# Patient Record
Sex: FEMALE | Race: WHITE | Employment: FULL TIME | URBAN - METROPOLITAN AREA
[De-identification: names, ages, dates, MRNs, and addresses within clinical notes are randomized per-mention and may not be internally consistent; named-entity substitution may affect disease eponyms.]

---

## 2016-12-01 PROCEDURE — G9678 ONCOLOGY CARE MODEL SERVICE: HCPCS | Performed by: INTERNAL MEDICINE

## 2017-01-16 ENCOUNTER — SNF/IP PROF CHARGE ONLY (OUTPATIENT)
Dept: HEMATOLOGY/ONCOLOGY | Facility: HOSPITAL | Age: 67
End: 2017-01-16

## 2017-01-16 DIAGNOSIS — Z51.81 ENCOUNTER FOR MONITORING AROMATASE INHIBITOR THERAPY: Primary | ICD-10-CM

## 2017-01-16 DIAGNOSIS — Z79.811 ENCOUNTER FOR MONITORING AROMATASE INHIBITOR THERAPY: Primary | ICD-10-CM

## 2017-01-30 ENCOUNTER — TELEPHONE (OUTPATIENT)
Dept: INTERNAL MEDICINE CLINIC | Facility: CLINIC | Age: 67
End: 2017-01-30

## 2017-01-30 ENCOUNTER — PRIOR ORIGINAL RECORDS (OUTPATIENT)
Dept: OTHER | Age: 67
End: 2017-01-30

## 2017-01-31 ENCOUNTER — PRIOR ORIGINAL RECORDS (OUTPATIENT)
Dept: OTHER | Age: 67
End: 2017-01-31

## 2017-01-31 ENCOUNTER — MYAURORA ACCOUNT LINK (OUTPATIENT)
Dept: OTHER | Age: 67
End: 2017-01-31

## 2017-01-31 ENCOUNTER — TELEPHONE (OUTPATIENT)
Dept: HEMATOLOGY/ONCOLOGY | Facility: HOSPITAL | Age: 67
End: 2017-01-31

## 2017-02-01 ENCOUNTER — TELEPHONE (OUTPATIENT)
Dept: HEMATOLOGY/ONCOLOGY | Facility: HOSPITAL | Age: 67
End: 2017-02-01

## 2017-02-01 DIAGNOSIS — Z51.81 ENCOUNTER FOR MONITORING AROMATASE INHIBITOR THERAPY: ICD-10-CM

## 2017-02-01 DIAGNOSIS — Z91.89 AT RISK FOR OSTEOPOROSIS/OSTEOPENIA: ICD-10-CM

## 2017-02-01 DIAGNOSIS — Z79.811 ENCOUNTER FOR MONITORING AROMATASE INHIBITOR THERAPY: ICD-10-CM

## 2017-02-01 DIAGNOSIS — C50.412 BREAST CANCER OF UPPER-OUTER QUADRANT OF LEFT FEMALE BREAST (HCC): Primary | ICD-10-CM

## 2017-02-01 NOTE — TELEPHONE ENCOUNTER
As instructed by Dr. Rebecca Pacheco, spoke with Dimas Swift let her know that there was nothing significant on her DEXA, shows osteopenia. Pt reports taking a calcium supplement and 2000 IU Vit D daily.  Per Dr. Rebecca Pacheco she should continue this and get her vit d level d

## 2017-02-02 ENCOUNTER — MYAURORA ACCOUNT LINK (OUTPATIENT)
Dept: OTHER | Age: 67
End: 2017-02-02

## 2017-02-03 ENCOUNTER — PRIOR ORIGINAL RECORDS (OUTPATIENT)
Dept: OTHER | Age: 67
End: 2017-02-03

## 2017-02-04 ENCOUNTER — MED REC SCAN ONLY (OUTPATIENT)
Dept: INTERNAL MEDICINE CLINIC | Facility: CLINIC | Age: 67
End: 2017-02-04

## 2017-02-15 ENCOUNTER — OFFICE VISIT (OUTPATIENT)
Dept: INTERNAL MEDICINE CLINIC | Facility: CLINIC | Age: 67
End: 2017-02-15

## 2017-02-15 VITALS
BODY MASS INDEX: 33.61 KG/M2 | SYSTOLIC BLOOD PRESSURE: 124 MMHG | HEIGHT: 62.25 IN | WEIGHT: 185 LBS | HEART RATE: 67 BPM | TEMPERATURE: 98 F | RESPIRATION RATE: 12 BRPM | OXYGEN SATURATION: 98 % | DIASTOLIC BLOOD PRESSURE: 60 MMHG

## 2017-02-15 DIAGNOSIS — Z51.81 MEDICATION MONITORING ENCOUNTER: ICD-10-CM

## 2017-02-15 DIAGNOSIS — C50.412 BREAST CANCER OF UPPER-OUTER QUADRANT OF LEFT FEMALE BREAST (HCC): ICD-10-CM

## 2017-02-15 DIAGNOSIS — E10.51 DM (DIABETES MELLITUS) TYPE I CONTROLLED, PERIPHERAL VASCULAR DISORDER: Primary | ICD-10-CM

## 2017-02-15 DIAGNOSIS — R06.00 DYSPNEA ON EXERTION: ICD-10-CM

## 2017-02-15 DIAGNOSIS — K31.84 GASTROPARESIS: ICD-10-CM

## 2017-02-15 DIAGNOSIS — E78.2 MIXED HYPERLIPIDEMIA: ICD-10-CM

## 2017-02-15 PROCEDURE — 99214 OFFICE O/P EST MOD 30 MIN: CPT | Performed by: INTERNAL MEDICINE

## 2017-02-15 NOTE — PROGRESS NOTES
HPI:    Patient ID: Orestes Khan is a 78year old female. HPIpt here for fu on dm htn and lipids. Has been having some issues with digestive tract. Is up to date on opthamology fu. Has brittle dm type 1. Is on a insulin pump.     Review of Systems overweight   HENT:   Head: Normocephalic. Mouth/Throat: No oropharyngeal exudate. Eyes: Conjunctivae are normal. Pupils are equal, round, and reactive to light. No scleral icterus. Neck: No JVD present. No thyromegaly present.    Cardiovascular: Nor

## 2017-02-23 ENCOUNTER — OFFICE VISIT (OUTPATIENT)
Dept: INTERNAL MEDICINE CLINIC | Facility: CLINIC | Age: 67
End: 2017-02-23

## 2017-02-23 VITALS
RESPIRATION RATE: 14 BRPM | HEART RATE: 69 BPM | OXYGEN SATURATION: 98 % | HEIGHT: 62.25 IN | DIASTOLIC BLOOD PRESSURE: 62 MMHG | BODY MASS INDEX: 33.07 KG/M2 | WEIGHT: 182 LBS | TEMPERATURE: 98 F | SYSTOLIC BLOOD PRESSURE: 118 MMHG

## 2017-02-23 DIAGNOSIS — R30.0 DYSURIA: Primary | ICD-10-CM

## 2017-02-23 LAB
CONTROL RUN WITHIN 24 HOURS?: YES
GLUCOSE URINE: 250
NITRITE URINE: POSITIVE
PH URINE: 5 (ref 5–8)
PROTEIN URINE: 100
SPEC GRAVITY: 1 (ref 1–1.03)
URINE CLARITY: CLEAR
URINE COLOR: YELLOW
UROBILINOGEN URINE: 4

## 2017-02-23 PROCEDURE — 99213 OFFICE O/P EST LOW 20 MIN: CPT | Performed by: FAMILY MEDICINE

## 2017-02-23 PROCEDURE — 87086 URINE CULTURE/COLONY COUNT: CPT | Performed by: FAMILY MEDICINE

## 2017-02-23 RX ORDER — CIPROFLOXACIN 250 MG/1
250 TABLET, FILM COATED ORAL 2 TIMES DAILY
Qty: 14 TABLET | Refills: 0 | Status: SHIPPED | OUTPATIENT
Start: 2017-02-23 | End: 2017-03-02

## 2017-02-23 RX ORDER — PHENAZOPYRIDINE HYDROCHLORIDE 100 MG/1
100 TABLET, FILM COATED ORAL 3 TIMES DAILY PRN
Qty: 6 TABLET | Refills: 1 | Status: SHIPPED | OUTPATIENT
Start: 2017-02-23 | End: 2017-08-08 | Stop reason: ALTCHOICE

## 2017-02-23 NOTE — PATIENT INSTRUCTIONS
Urinary Tract Infections in Women    Urinary tract infections (UTIs) are most often caused by bacteria (germs). These bacteria enter the urinary tract. The bacteria may come from outside the body.  Or they may travel from the skin outside the rectum or va The lifestyle changes below will help get rid of your UTI. They may also help prevent future UTIs. · Drink plenty of fluids. This includes water, juice, or other caffeine-free drinks. Fluids help flush bacteria out of your body. · Empty your bladder.  Alw

## 2017-02-23 NOTE — PROGRESS NOTES
Patient has been having symptoms of burning on urination, increased urinary frequency, and mild suprapubic pressure. Patient reports no vaginal discharge. Patient denies flank pain or fever or chills.    History of UTI: yes  Blood in urine: no  Nausea:no 2009     SVC thrombus;  Thrombolysis 05/29/2009   • Breast cancer ([facility] 06/21/2012   • Breast cancer, left ([facility] 2009     lumpectomy 01/16/2009   • Breast cancer, left ([facility] 2009     left breast re-excision, left axillary SLNB   • Breast cancer of upper-out above  Drink plenty of fluids  Urine culture sent.    Patient verbalizes understanding and agrees to plan

## 2017-02-27 NOTE — PROGRESS NOTES
Quick Note:    Please tell pt urine actually did not show an infection. She can stop the antibiotics. If she is still having urinary problems should she follow up so we can recheck her urine.  Thank you  ______

## 2017-03-10 ENCOUNTER — PRIOR ORIGINAL RECORDS (OUTPATIENT)
Dept: OTHER | Age: 67
End: 2017-03-10

## 2017-03-27 ENCOUNTER — TELEPHONE (OUTPATIENT)
Dept: OPTOMETRY | Facility: CLINIC | Age: 67
End: 2017-03-27

## 2017-03-27 NOTE — TELEPHONE ENCOUNTER
pt called. Would like her contacts rechecked. Looks like she called in October to have more contacts ordered. LOV 7/14/16. She states she Needs to be seen in the next week or so because she is having a hard time seeing with her contacts.   Please advise

## 2017-03-31 ENCOUNTER — OFFICE VISIT (OUTPATIENT)
Dept: OPTOMETRY | Facility: CLINIC | Age: 67
End: 2017-03-31

## 2017-03-31 DIAGNOSIS — H52.13 MYOPIA, BILATERAL: Primary | ICD-10-CM

## 2017-03-31 PROCEDURE — 99211 OFF/OP EST MAY X REQ PHY/QHP: CPT | Performed by: OPTOMETRIST

## 2017-03-31 NOTE — PROGRESS NOTES
Thiago Uribe is a 75year old female. HPI:     HPI     Patient is in for a contact lens check. Patient states that she does not see well at night and feels that she needs more power. I advised could also be glare from increasing cataract OS.        L 0.6 - 1.2 oz/week       0 Standard drinks or equivalent, 1-2 Glasses of wine per week       Comment: weekly      Medications:    Current Outpatient Prescriptions:  Phenazopyridine HCl 100 MG Oral Tab Take 1 tablet (100 mg total) by mouth 3 (three) times da Contact Lens Exam     Current Contact Lens Rx      Brand Base Curve Diameter Sphere Over-Sphere   Right        Left Brule II 7.67 9.2 -11.00 -0.25       Lens shows excellent c and m ou.       Final Contact Lens Rx      Brand Base Curve Diameter Sphere

## 2017-04-04 ENCOUNTER — TELEPHONE (OUTPATIENT)
Dept: INTERNAL MEDICINE CLINIC | Facility: CLINIC | Age: 67
End: 2017-04-04

## 2017-04-04 NOTE — TELEPHONE ENCOUNTER
Spoke with patient per Dr Josiah Habermann no medications needed to hold she may hold the baby aspirin for a few days prior then restart after the procedure if the dentist has concern

## 2017-06-20 ENCOUNTER — PRIOR ORIGINAL RECORDS (OUTPATIENT)
Dept: OTHER | Age: 67
End: 2017-06-20

## 2017-06-20 ENCOUNTER — LAB ENCOUNTER (OUTPATIENT)
Dept: LAB | Facility: HOSPITAL | Age: 67
End: 2017-06-20
Attending: INTERNAL MEDICINE
Payer: MEDICARE

## 2017-06-20 DIAGNOSIS — E03.9 HYPOTHYROIDISM: Primary | ICD-10-CM

## 2017-06-20 DIAGNOSIS — E10.9 TYPE 1 DIABETES (HCC): ICD-10-CM

## 2017-06-20 PROCEDURE — 36415 COLL VENOUS BLD VENIPUNCTURE: CPT

## 2017-06-20 PROCEDURE — 84439 ASSAY OF FREE THYROXINE: CPT

## 2017-06-20 PROCEDURE — 80061 LIPID PANEL: CPT

## 2017-06-20 PROCEDURE — 84443 ASSAY THYROID STIM HORMONE: CPT

## 2017-06-20 PROCEDURE — 82043 UR ALBUMIN QUANTITATIVE: CPT

## 2017-06-20 PROCEDURE — 82570 ASSAY OF URINE CREATININE: CPT

## 2017-06-20 PROCEDURE — 83036 HEMOGLOBIN GLYCOSYLATED A1C: CPT

## 2017-06-20 PROCEDURE — 80053 COMPREHEN METABOLIC PANEL: CPT

## 2017-06-21 ENCOUNTER — PRIOR ORIGINAL RECORDS (OUTPATIENT)
Dept: OTHER | Age: 67
End: 2017-06-21

## 2017-06-21 LAB
ALBUMIN: 3.2 G/DL
ALKALINE PHOSPHATATE(ALK PHOS): 89 IU/L
ALT (SGPT): 27 U/L
AST (SGOT): 27 U/L
BILIRUBIN TOTAL: 0.7 MG/DL
BUN: 10 MG/DL
CALCIUM: 9 MG/DL
CHLORIDE: 104 MEQ/L
CHOLESTEROL, TOTAL: 133 MG/DL
CREATININE, SERUM: 0.8 MG/DL
FREE T4: 1.38 MG/DL
GLOBULIN: 2.6 G/DL
GLUCOSE: 103 MG/DL
GLUCOSE: 103 MG/DL
HDL CHOLESTEROL: 58 MG/DL
HEMOGLOBIN A1C: 8.5 %
LDL CHOLESTEROL: 65 MG/DL
POTASSIUM, SERUM: 3.9 MEQ/L
PROTEIN, TOTAL: 5.8 G/DL
SGOT (AST): 27 IU/L
SGPT (ALT): 27 IU/L
SODIUM: 141 MEQ/L
THYROID STIMULATING HORMONE: 0.27 MLU/L
TRIGLYCERIDES: 49 MG/DL

## 2017-07-03 ENCOUNTER — TELEPHONE (OUTPATIENT)
Dept: INTERNAL MEDICINE CLINIC | Facility: CLINIC | Age: 67
End: 2017-07-03

## 2017-07-03 ENCOUNTER — NURSE ONLY (OUTPATIENT)
Dept: INTERNAL MEDICINE CLINIC | Facility: CLINIC | Age: 67
End: 2017-07-03

## 2017-07-03 DIAGNOSIS — R39.9 UTI SYMPTOMS: Primary | ICD-10-CM

## 2017-07-03 DIAGNOSIS — R39.9 UTI SYMPTOMS: ICD-10-CM

## 2017-07-03 LAB
BILIRUB UR QL: NEGATIVE
BILIRUBIN: NEGATIVE
COLOR UR: YELLOW
GLUCOSE (URINE DIPSTICK): 1000 MG/DL
GLUCOSE UR-MCNC: >=500 MG/DL
KETONES (URINE DIPSTICK): NEGATIVE MG/DL
KETONES UR-MCNC: NEGATIVE MG/DL
MULTISTIX LOT#: ABNORMAL NUMERIC
NITRITE UR QL STRIP.AUTO: NEGATIVE
NITRITE, URINE: NEGATIVE
PH UR: 5 [PH] (ref 5–8)
PH, URINE: 5 (ref 4.5–8)
PROT UR-MCNC: NEGATIVE MG/DL
RBC #/AREA URNS AUTO: 11 /HPF
SP GR UR STRIP: 1.01 (ref 1–1.03)
SPECIFIC GRAVITY: 1.01 (ref 1–1.03)
UROBILINOGEN UR STRIP-ACNC: <2
UROBILINOGEN,SEMI-QN: 0.2 MG/DL (ref 0–1.9)
VIT C UR-MCNC: NEGATIVE MG/DL
WBC #/AREA URNS AUTO: 150 /HPF

## 2017-07-03 PROCEDURE — 81003 URINALYSIS AUTO W/O SCOPE: CPT | Performed by: INTERNAL MEDICINE

## 2017-07-03 NOTE — PROGRESS NOTES
Pt's  verified  Per Dr. Shellie Real performed an in office urine dip- abn- sent to [facility] lab for culture

## 2017-08-04 ENCOUNTER — TELEPHONE (OUTPATIENT)
Dept: INTERNAL MEDICINE CLINIC | Facility: CLINIC | Age: 67
End: 2017-08-04

## 2017-08-04 DIAGNOSIS — Z01.818 PREOPERATIVE CLEARANCE: Primary | ICD-10-CM

## 2017-08-04 NOTE — TELEPHONE ENCOUNTER
Called patient she saw Dr Laura Green today he wants to do an a emergency vitrectomy surgery. Patient has to go to the theater tonight and to a wedding tomorrow she does not have time to complete these tests this evening.  She was agitated she did not know to

## 2017-08-05 ENCOUNTER — APPOINTMENT (OUTPATIENT)
Dept: LAB | Facility: HOSPITAL | Age: 67
End: 2017-08-05
Attending: OPHTHALMOLOGY
Payer: MEDICARE

## 2017-08-05 ENCOUNTER — HOSPITAL ENCOUNTER (OUTPATIENT)
Dept: GENERAL RADIOLOGY | Facility: HOSPITAL | Age: 67
Discharge: HOME | End: 2017-08-05
Attending: OPHTHALMOLOGY
Payer: MEDICARE

## 2017-08-05 ENCOUNTER — LAB ENCOUNTER (OUTPATIENT)
Dept: LAB | Facility: HOSPITAL | Age: 67
End: 2017-08-05
Attending: OPHTHALMOLOGY
Payer: MEDICARE

## 2017-08-05 DIAGNOSIS — Z01.818 PREOP EXAMINATION: ICD-10-CM

## 2017-08-05 DIAGNOSIS — Z01.818 PRE-OP EXAM: ICD-10-CM

## 2017-08-05 DIAGNOSIS — Z01.818 PRE-OP EXAM: Primary | ICD-10-CM

## 2017-08-05 LAB
ANION GAP SERPL CALC-SCNC: 7 MMOL/L (ref 0–18)
BASOPHILS # BLD: 0.1 K/UL (ref 0–0.2)
BASOPHILS NFR BLD: 1 %
BUN SERPL-MCNC: 9 MG/DL (ref 8–20)
BUN/CREAT SERPL: 11 (ref 10–20)
CALCIUM SERPL-MCNC: 8.8 MG/DL (ref 8.5–10.5)
CHLORIDE SERPL-SCNC: 102 MMOL/L (ref 95–110)
CO2 SERPL-SCNC: 28 MMOL/L (ref 22–32)
CREAT SERPL-MCNC: 0.82 MG/DL (ref 0.5–1.5)
EOSINOPHIL # BLD: 0.7 K/UL (ref 0–0.7)
EOSINOPHIL NFR BLD: 9 %
ERYTHROCYTE [DISTWIDTH] IN BLOOD BY AUTOMATED COUNT: 13.6 % (ref 11–15)
GLUCOSE SERPL-MCNC: 268 MG/DL (ref 70–99)
HCT VFR BLD AUTO: 41.9 % (ref 35–48)
HGB BLD-MCNC: 13.7 G/DL (ref 12–16)
LYMPHOCYTES # BLD: 1.1 K/UL (ref 1–4)
LYMPHOCYTES NFR BLD: 15 %
MCH RBC QN AUTO: 29.8 PG (ref 27–32)
MCHC RBC AUTO-ENTMCNC: 32.7 G/DL (ref 32–37)
MCV RBC AUTO: 91.3 FL (ref 80–100)
MONOCYTES # BLD: 0.5 K/UL (ref 0–1)
MONOCYTES NFR BLD: 7 %
NEUTROPHILS # BLD AUTO: 5.1 K/UL (ref 1.8–7.7)
NEUTROPHILS NFR BLD: 68 %
OSMOLALITY UR CALC.SUM OF ELEC: 292 MOSM/KG (ref 275–295)
PLATELET # BLD AUTO: 204 K/UL (ref 140–400)
PMV BLD AUTO: 10.6 FL (ref 7.4–10.3)
POTASSIUM SERPL-SCNC: 3.9 MMOL/L (ref 3.3–5.1)
RBC # BLD AUTO: 4.58 M/UL (ref 3.7–5.4)
SODIUM SERPL-SCNC: 137 MMOL/L (ref 136–144)
WBC # BLD AUTO: 7.5 K/UL (ref 4–11)

## 2017-08-05 PROCEDURE — 36415 COLL VENOUS BLD VENIPUNCTURE: CPT

## 2017-08-05 PROCEDURE — 85025 COMPLETE CBC W/AUTO DIFF WBC: CPT

## 2017-08-05 PROCEDURE — 71020 XR CHEST PA + LAT CHEST (CPT=71020): CPT | Performed by: OPHTHALMOLOGY

## 2017-08-05 PROCEDURE — 80048 BASIC METABOLIC PNL TOTAL CA: CPT

## 2017-08-05 PROCEDURE — 93005 ELECTROCARDIOGRAM TRACING: CPT

## 2017-08-05 PROCEDURE — 93010 ELECTROCARDIOGRAM REPORT: CPT | Performed by: OPHTHALMOLOGY

## 2017-08-08 ENCOUNTER — OFFICE VISIT (OUTPATIENT)
Dept: INTERNAL MEDICINE CLINIC | Facility: CLINIC | Age: 67
End: 2017-08-08

## 2017-08-08 VITALS
HEART RATE: 69 BPM | RESPIRATION RATE: 18 BRPM | BODY MASS INDEX: 33.25 KG/M2 | DIASTOLIC BLOOD PRESSURE: 74 MMHG | OXYGEN SATURATION: 98 % | TEMPERATURE: 98 F | WEIGHT: 183 LBS | HEIGHT: 62.25 IN | SYSTOLIC BLOOD PRESSURE: 124 MMHG

## 2017-08-08 DIAGNOSIS — E10.51 DM (DIABETES MELLITUS) TYPE I CONTROLLED, PERIPHERAL VASCULAR DISORDER: ICD-10-CM

## 2017-08-08 DIAGNOSIS — Z01.818 PREOPERATIVE CLEARANCE: Primary | ICD-10-CM

## 2017-08-08 PROCEDURE — 99214 OFFICE O/P EST MOD 30 MIN: CPT | Performed by: INTERNAL MEDICINE

## 2017-08-08 NOTE — PROGRESS NOTES
HPI:    Patient ID: Ever Vázquez is a 75year old female. HPIPt here for a preoperative evaluation for a right vitrectomy for acute retinal bleeding. Had preoperative labs and cxr and ekg .   All reviewed and normal except for fasting glucose elevati well-nourished. HENT:   Head: Normocephalic and atraumatic. Mouth/Throat: Oropharyngeal exudate (Pnd) present. Eyes: Conjunctivae are normal. Pupils are equal, round, and reactive to light. Neck: Neck supple.    Cardiovascular: Normal rate and regul

## 2017-08-30 ENCOUNTER — PRIOR ORIGINAL RECORDS (OUTPATIENT)
Dept: OTHER | Age: 67
End: 2017-08-30

## 2017-08-31 ENCOUNTER — TELEPHONE (OUTPATIENT)
Dept: INTERNAL MEDICINE CLINIC | Facility: CLINIC | Age: 67
End: 2017-08-31

## 2017-09-15 ENCOUNTER — TELEPHONE (OUTPATIENT)
Dept: OPHTHALMOLOGY | Facility: CLINIC | Age: 67
End: 2017-09-15

## 2017-09-15 NOTE — TELEPHONE ENCOUNTER
Pt is having trouble seeingand needs contacs.  pt is leaving to [place] in [employer] and needs an appointment as soon as possible

## 2017-09-26 ENCOUNTER — LAB ENCOUNTER (OUTPATIENT)
Dept: LAB | Facility: HOSPITAL | Age: 67
End: 2017-09-26
Attending: INTERNAL MEDICINE
Payer: MEDICARE

## 2017-09-26 ENCOUNTER — PRIOR ORIGINAL RECORDS (OUTPATIENT)
Dept: OTHER | Age: 67
End: 2017-09-26

## 2017-09-26 DIAGNOSIS — E03.9 HYPOTHYROIDISM: ICD-10-CM

## 2017-09-26 DIAGNOSIS — I25.10 CORONARY ARTERY DISEASE: Primary | ICD-10-CM

## 2017-09-26 LAB
ALBUMIN SERPL BCP-MCNC: 3.5 G/DL (ref 3.5–4.8)
ALBUMIN/GLOB SERPL: 1.3 {RATIO} (ref 1–2)
ALP SERPL-CCNC: 93 U/L (ref 32–100)
ALT SERPL-CCNC: 28 U/L (ref 14–54)
ANION GAP SERPL CALC-SCNC: 6 MMOL/L (ref 0–18)
AST SERPL-CCNC: 26 U/L (ref 15–41)
BILIRUB SERPL-MCNC: 0.7 MG/DL (ref 0.3–1.2)
BUN SERPL-MCNC: 10 MG/DL (ref 8–20)
BUN/CREAT SERPL: 13.9 (ref 10–20)
CALCIUM SERPL-MCNC: 9 MG/DL (ref 8.5–10.5)
CHLORIDE SERPL-SCNC: 103 MMOL/L (ref 95–110)
CHOLEST SERPL-MCNC: 153 MG/DL (ref 110–200)
CO2 SERPL-SCNC: 31 MMOL/L (ref 22–32)
CREAT SERPL-MCNC: 0.72 MG/DL (ref 0.5–1.5)
GLOBULIN PLAS-MCNC: 2.6 G/DL (ref 2.5–3.7)
GLUCOSE SERPL-MCNC: 162 MG/DL (ref 70–99)
HBA1C MFR BLD: 8.8 % (ref 4–6)
HDLC SERPL-MCNC: 67 MG/DL
LDLC SERPL CALC-MCNC: 74 MG/DL (ref 0–99)
NONHDLC SERPL-MCNC: 86 MG/DL
OSMOLALITY UR CALC.SUM OF ELEC: 293 MOSM/KG (ref 275–295)
POTASSIUM SERPL-SCNC: 4 MMOL/L (ref 3.3–5.1)
PROT SERPL-MCNC: 6.1 G/DL (ref 5.9–8.4)
SODIUM SERPL-SCNC: 140 MMOL/L (ref 136–144)
T4 FREE SERPL-MCNC: 1.27 NG/DL (ref 0.58–1.64)
TRIGL SERPL-MCNC: 60 MG/DL (ref 1–149)
TSH SERPL-ACNC: 1.11 UIU/ML (ref 0.45–5.33)

## 2017-09-26 PROCEDURE — 36415 COLL VENOUS BLD VENIPUNCTURE: CPT

## 2017-09-26 PROCEDURE — 80061 LIPID PANEL: CPT

## 2017-09-26 PROCEDURE — 83036 HEMOGLOBIN GLYCOSYLATED A1C: CPT

## 2017-09-26 PROCEDURE — 84439 ASSAY OF FREE THYROXINE: CPT

## 2017-09-26 PROCEDURE — 80053 COMPREHEN METABOLIC PANEL: CPT

## 2017-09-26 PROCEDURE — 84443 ASSAY THYROID STIM HORMONE: CPT

## 2017-09-27 LAB
ALBUMIN: 3.5 G/DL
ALKALINE PHOSPHATATE(ALK PHOS): 93 IU/L
ALT (SGPT): 28 U/L
AST (SGOT): 26 U/L
BILIRUBIN TOTAL: 0.7 MG/DL
BUN: 10 MG/DL
CALCIUM: 9 MG/DL
CHLORIDE: 103 MEQ/L
CHOLESTEROL, TOTAL: 153 MG/DL
CREATININE, SERUM: 0.72 MG/DL
FREE T4: 1.27 MG/DL
GLOBULIN: 2.6 G/DL
GLUCOSE: 162 MG/DL
GLUCOSE: 162 MG/DL
HDL CHOLESTEROL: 67 MG/DL
HEMOGLOBIN A1C: 8.8 %
LDL CHOLESTEROL: 74 MG/DL
POTASSIUM, SERUM: 4 MEQ/L
PROTEIN, TOTAL: 6.1 G/DL
SGOT (AST): 26 IU/L
SGPT (ALT): 28 IU/L
SODIUM: 140 MEQ/L
THYROID STIMULATING HORMONE: 1.11 MLU/L
TRIGLYCERIDES: 60 MG/DL

## 2017-09-29 ENCOUNTER — PRIOR ORIGINAL RECORDS (OUTPATIENT)
Dept: OTHER | Age: 67
End: 2017-09-29

## 2017-10-10 ENCOUNTER — TELEPHONE (OUTPATIENT)
Dept: HEMATOLOGY/ONCOLOGY | Facility: HOSPITAL | Age: 67
End: 2017-10-10

## 2017-10-10 DIAGNOSIS — C50.919 MALIGNANT NEOPLASM OF BREAST (FEMALE) (HCC): Primary | ICD-10-CM

## 2017-10-10 NOTE — TELEPHONE ENCOUNTER
Called pt and let her know order has been entered, she states she will do it in December prior to her appt with Dr. Jeremías Dorsey. I encouraged her to do it sooner than December as her last mammo was done more that a year ago. Janett Oswald verbalized understanding.

## 2017-10-10 NOTE — TELEPHONE ENCOUNTER
Nahun Corbett calling for order for mammo. She is scheduled with Dr Joan Elmore in December . Please contact morelia when order is placed.  garcia

## 2017-10-12 ENCOUNTER — TELEPHONE (OUTPATIENT)
Dept: HEMATOLOGY/ONCOLOGY | Facility: HOSPITAL | Age: 67
End: 2017-10-12

## 2017-10-12 DIAGNOSIS — C50.412 BREAST CANCER OF UPPER-OUTER QUADRANT OF LEFT FEMALE BREAST (HCC): ICD-10-CM

## 2017-10-12 DIAGNOSIS — C50.919 MALIGNANT NEOPLASM OF BREAST (FEMALE) (HCC): Primary | ICD-10-CM

## 2017-10-12 NOTE — TELEPHONE ENCOUNTER
Pt scheduled diagnostic mammo for 10/23/17 but ICD-10 code on order did not pass. Needs to be changed/revised and new order sent.

## 2017-10-23 ENCOUNTER — HOSPITAL ENCOUNTER (OUTPATIENT)
Dept: MAMMOGRAPHY | Facility: HOSPITAL | Age: 67
Discharge: HOME | End: 2017-10-23
Attending: INTERNAL MEDICINE
Payer: MEDICARE

## 2017-10-23 ENCOUNTER — TELEPHONE (OUTPATIENT)
Dept: INTERNAL MEDICINE CLINIC | Facility: CLINIC | Age: 67
End: 2017-10-23

## 2017-10-23 DIAGNOSIS — C50.919 MALIGNANT NEOPLASM OF BREAST (FEMALE) (HCC): ICD-10-CM

## 2017-10-23 PROCEDURE — 77066 DX MAMMO INCL CAD BI: CPT | Performed by: INTERNAL MEDICINE

## 2017-10-24 ENCOUNTER — OFFICE VISIT (OUTPATIENT)
Dept: INTERNAL MEDICINE CLINIC | Facility: CLINIC | Age: 67
End: 2017-10-24

## 2017-10-24 VITALS
DIASTOLIC BLOOD PRESSURE: 58 MMHG | SYSTOLIC BLOOD PRESSURE: 122 MMHG | OXYGEN SATURATION: 96 % | BODY MASS INDEX: 33 KG/M2 | TEMPERATURE: 99 F | WEIGHT: 183 LBS | HEART RATE: 84 BPM

## 2017-10-24 DIAGNOSIS — E10.51 DM (DIABETES MELLITUS) TYPE I CONTROLLED, PERIPHERAL VASCULAR DISORDER: ICD-10-CM

## 2017-10-24 DIAGNOSIS — Z23 INFLUENZA VACCINE NEEDED: ICD-10-CM

## 2017-10-24 DIAGNOSIS — J45.21 MILD INTERMITTENT ASTHMATIC BRONCHITIS WITH ACUTE EXACERBATION: Primary | ICD-10-CM

## 2017-10-24 PROCEDURE — 94640 AIRWAY INHALATION TREATMENT: CPT | Performed by: INTERNAL MEDICINE

## 2017-10-24 PROCEDURE — 99213 OFFICE O/P EST LOW 20 MIN: CPT | Performed by: INTERNAL MEDICINE

## 2017-10-24 RX ORDER — ALBUTEROL SULFATE 90 UG/1
2 AEROSOL, METERED RESPIRATORY (INHALATION) EVERY 4 HOURS PRN
Qty: 1 INHALER | Refills: 6 | Status: SHIPPED | OUTPATIENT
Start: 2017-10-24 | End: 2018-01-23

## 2017-10-24 RX ORDER — PROMETHAZINE HYDROCHLORIDE AND CODEINE PHOSPHATE 6.25; 1 MG/5ML; MG/5ML
2.5 SYRUP ORAL EVERY 4 HOURS PRN
Qty: 120 ML | Refills: 0 | Status: SHIPPED | OUTPATIENT
Start: 2017-10-24 | End: 2017-11-28 | Stop reason: ALTCHOICE

## 2017-10-24 RX ORDER — METOPROLOL SUCCINATE 25 MG/1
TABLET, EXTENDED RELEASE ORAL
Refills: 4 | COMMUNITY
Start: 2017-10-22

## 2017-10-24 RX ORDER — CLOBETASOL PROPIONATE 0.46 MG/ML
SOLUTION TOPICAL
Refills: 2 | COMMUNITY
Start: 2017-10-16

## 2017-10-24 RX ORDER — CEFDINIR 300 MG/1
300 CAPSULE ORAL 2 TIMES DAILY
Qty: 20 CAPSULE | Refills: 1 | Status: SHIPPED | OUTPATIENT
Start: 2017-10-24 | End: 2017-11-28 | Stop reason: ALTCHOICE

## 2017-10-24 RX ORDER — IPRATROPIUM BROMIDE AND ALBUTEROL SULFATE 2.5; .5 MG/3ML; MG/3ML
3 SOLUTION RESPIRATORY (INHALATION) ONCE
Status: COMPLETED | OUTPATIENT
Start: 2017-10-24 | End: 2017-10-24

## 2017-10-24 RX ADMIN — IPRATROPIUM BROMIDE AND ALBUTEROL SULFATE 3 ML: 2.5; .5 SOLUTION RESPIRATORY (INHALATION) at 12:31:00

## 2017-10-24 NOTE — PROGRESS NOTES
HPI:    Patient ID: Conception Marker is a 75year old female. Pt here for eval of a cough and chest congestion. Has been under the weather for over a week. Is a non smoker. Has had some chills.     Review of Systems   Constitutional: Positive for fatig Disp:  Rfl: 10   simvastatin (ZOCOR) 40 MG Oral Tab  Disp:  Rfl: 11   NOVOLOG 100 UNIT/ML Subcutaneous Solution  Disp:  Rfl: 4   Levothyroxine Sodium (SYNTHROID, LEVOTHROID) 137 MCG Oral Tab Take 137 mcg by mouth before breakfast.   Disp:  Rfl: 1   aspir acute exacerbation  (primary encounter diagnosis) Pro air  2 puffs q 6 prn cefdinir 300mg bid x 10 days  Dm (diabetes mellitus) type i controlled, peripheral vascular disorder (hcc)sugars are high due to ongoing illness. Has brittle disease.     No orders

## 2017-10-24 NOTE — PROGRESS NOTES
Pt's  verified  Per Dr. Priscilla Hernandez administered DUONEB via nebulizer- Pt tolerated treatment well.

## 2017-11-15 ENCOUNTER — TELEPHONE (OUTPATIENT)
Dept: OPTOMETRY | Facility: CLINIC | Age: 67
End: 2017-11-15

## 2017-11-15 RX ORDER — ANASTROZOLE 1 MG/1
TABLET ORAL
Qty: 30 TABLET | Refills: 0 | Status: SHIPPED | OUTPATIENT
Start: 2017-11-15 | End: 2017-12-09

## 2017-11-15 NOTE — TELEPHONE ENCOUNTER
pt called. She states her contacts do not seem to be working correctly. LOV 3/31/17. Asking to see PPS in the next 2 weeks if possible. She is out of town now, will be back Monday evening, please call. Thank you.

## 2017-11-28 ENCOUNTER — OFFICE VISIT (OUTPATIENT)
Dept: INTERNAL MEDICINE CLINIC | Facility: CLINIC | Age: 67
End: 2017-11-28

## 2017-11-28 VITALS
HEART RATE: 82 BPM | OXYGEN SATURATION: 99 % | DIASTOLIC BLOOD PRESSURE: 76 MMHG | WEIGHT: 183 LBS | SYSTOLIC BLOOD PRESSURE: 128 MMHG | RESPIRATION RATE: 17 BRPM | HEIGHT: 62.25 IN | BODY MASS INDEX: 33.25 KG/M2

## 2017-11-28 DIAGNOSIS — S23.41XA SPRAIN OF COSTAL CARTILAGE, INITIAL ENCOUNTER: Primary | ICD-10-CM

## 2017-11-28 DIAGNOSIS — E10.51 DM (DIABETES MELLITUS) TYPE I CONTROLLED, PERIPHERAL VASCULAR DISORDER: ICD-10-CM

## 2017-11-28 DIAGNOSIS — E66.9 OBESITY (BMI 30.0-34.9): ICD-10-CM

## 2017-11-28 DIAGNOSIS — E10.36 TYPE 1 DIABETES MELLITUS WITH DIABETIC CATARACT (HCC): ICD-10-CM

## 2017-11-28 PROCEDURE — 99213 OFFICE O/P EST LOW 20 MIN: CPT | Performed by: FAMILY MEDICINE

## 2017-11-29 PROBLEM — E10.36 TYPE 1 DIABETES MELLITUS WITH DIABETIC CATARACT (HCC): Status: ACTIVE | Noted: 2017-11-29

## 2017-11-29 PROBLEM — Z98.890 HISTORY OF VITRECTOMY: Status: ACTIVE | Noted: 2017-11-29

## 2017-11-29 PROBLEM — Z85.3 HISTORY OF BREAST CANCER IN FEMALE: Status: ACTIVE | Noted: 2017-11-29

## 2017-11-29 NOTE — PROGRESS NOTES
CC:  Pain (Pt presents to clinic for c/o  right chest with extension to the right side on and off x months. )      Hx of CC:  LOWER RIB CAGE/TRUNK PAIN WITH CERTAIN MOVEMENTS X MONTHS BUT WORSENING LATELY. NO ACUTE INJURY.   PATIENT RECENTLY RETURNED FROM

## 2017-11-30 ENCOUNTER — TELEPHONE (OUTPATIENT)
Dept: OPTOMETRY | Facility: CLINIC | Age: 67
End: 2017-11-30

## 2017-11-30 NOTE — TELEPHONE ENCOUNTER
Patient is due to see both Dr. Angeli Loganident and Dr. Cuco Sarmiento in the next few weeks. She is not seeing as well out of her OS again. I have been adding more minus OS as cataract increases.  Not seeing clear again and I explained that there will be a limit to how much

## 2017-12-11 RX ORDER — ANASTROZOLE 1 MG/1
TABLET ORAL
Qty: 30 TABLET | Refills: 0 | Status: SHIPPED | OUTPATIENT
Start: 2017-12-11 | End: 2017-12-28

## 2017-12-13 ENCOUNTER — APPOINTMENT (OUTPATIENT)
Dept: HEMATOLOGY/ONCOLOGY | Facility: HOSPITAL | Age: 67
End: 2017-12-13
Attending: INTERNAL MEDICINE
Payer: MEDICARE

## 2017-12-18 ENCOUNTER — HOSPITAL ENCOUNTER (OUTPATIENT)
Age: 67
Discharge: HOME | End: 2017-12-18
Attending: FAMILY MEDICINE
Payer: MEDICARE

## 2017-12-18 ENCOUNTER — TELEPHONE (OUTPATIENT)
Dept: INTERNAL MEDICINE CLINIC | Facility: CLINIC | Age: 67
End: 2017-12-18

## 2017-12-18 VITALS
BODY MASS INDEX: 32.94 KG/M2 | RESPIRATION RATE: 16 BRPM | DIASTOLIC BLOOD PRESSURE: 80 MMHG | HEIGHT: 62 IN | OXYGEN SATURATION: 97 % | SYSTOLIC BLOOD PRESSURE: 142 MMHG | TEMPERATURE: 97 F | HEART RATE: 78 BPM | WEIGHT: 179 LBS

## 2017-12-18 DIAGNOSIS — N39.0 ACUTE UTI: Primary | ICD-10-CM

## 2017-12-18 PROCEDURE — 99213 OFFICE O/P EST LOW 20 MIN: CPT

## 2017-12-18 PROCEDURE — 81002 URINALYSIS NONAUTO W/O SCOPE: CPT

## 2017-12-18 PROCEDURE — 99203 OFFICE O/P NEW LOW 30 MIN: CPT

## 2017-12-18 RX ORDER — CIPROFLOXACIN 250 MG/1
250 TABLET, FILM COATED ORAL 2 TIMES DAILY
Qty: 6 TABLET | Refills: 0 | Status: SHIPPED | OUTPATIENT
Start: 2017-12-18 | End: 2017-12-21

## 2017-12-18 RX ORDER — FLUCONAZOLE 150 MG/1
150 TABLET ORAL ONCE
Qty: 1 TABLET | Refills: 0 | Status: SHIPPED | OUTPATIENT
Start: 2017-12-18 | End: 2017-12-18

## 2017-12-18 NOTE — ED PROVIDER NOTES
Patient presents with:  Urinary Symptoms (urologic)    HPI:     Nell Montgomery is a 75year old female who presents with a chief complaint of dysuria, frequency, urgency of urination  Onset of symptoms: 2  days  Associated symptoms:  dysuria and urinary f 12/18/17  4:33 PM   Result Value Ref Range   Urine Color Yellow Yellow   Urine Clarity Cloudy (A) Clear   Glucose, Urine 100  (A) Negative mg/dL   Bilirubin, Urine Negative Negative   Ketone, Urine Negative Negative mg/dL   Specific Gravity, Urine 1.020 1.

## 2017-12-18 NOTE — TELEPHONE ENCOUNTER
Pt l/m on nurse line about possible UTI- has leftover ABX from last one and wants a call back to know if it is okay to take them

## 2017-12-18 NOTE — TELEPHONE ENCOUNTER
Spoke with the patient and explained not to take the previous antibiotic, We do not know what if any infection is being treated and old medication is not appropriate just to take.  She understood as she thought she had a UTI while in [place] she went to [facility]

## 2017-12-21 ENCOUNTER — TELEPHONE (OUTPATIENT)
Dept: OPTOMETRY | Facility: CLINIC | Age: 67
End: 2017-12-21

## 2017-12-23 ENCOUNTER — PRIOR ORIGINAL RECORDS (OUTPATIENT)
Dept: OTHER | Age: 67
End: 2017-12-23

## 2017-12-23 ENCOUNTER — LAB ENCOUNTER (OUTPATIENT)
Dept: LAB | Facility: HOSPITAL | Age: 67
End: 2017-12-23
Attending: INTERNAL MEDICINE
Payer: MEDICARE

## 2017-12-23 DIAGNOSIS — E03.9 HYPOTHYROIDISM: ICD-10-CM

## 2017-12-23 DIAGNOSIS — E10.9 DIABETES MELLITUS TYPE I (HCC): Primary | ICD-10-CM

## 2017-12-23 PROCEDURE — 80061 LIPID PANEL: CPT

## 2017-12-23 PROCEDURE — 84439 ASSAY OF FREE THYROXINE: CPT

## 2017-12-23 PROCEDURE — 83036 HEMOGLOBIN GLYCOSYLATED A1C: CPT

## 2017-12-23 PROCEDURE — 82043 UR ALBUMIN QUANTITATIVE: CPT

## 2017-12-23 PROCEDURE — 80053 COMPREHEN METABOLIC PANEL: CPT

## 2017-12-23 PROCEDURE — 84443 ASSAY THYROID STIM HORMONE: CPT

## 2017-12-23 PROCEDURE — 82570 ASSAY OF URINE CREATININE: CPT

## 2017-12-23 PROCEDURE — 36415 COLL VENOUS BLD VENIPUNCTURE: CPT

## 2017-12-26 LAB
ALBUMIN: 3.2 G/DL
ALKALINE PHOSPHATATE(ALK PHOS): 97 IU/L
ALT (SGPT): 35 U/L
AST (SGOT): 30 U/L
BILIRUBIN TOTAL: 0.5 MG/DL
BUN: 11 MG/DL
CALCIUM: 9 MG/DL
CHLORIDE: 105 MEQ/L
CHOLESTEROL, TOTAL: 158 MG/DL
CREATININE, SERUM: 0.87 MG/DL
GLOBULIN: 2.9 G/DL
GLUCOSE: 145 MG/DL
GLUCOSE: 145 MG/DL
GLYCOHEMOGLOBIN: 8.6 %
HDL CHOLESTEROL: 55 MG/DL
LDL CHOLESTEROL: 92 MG/DL
NON-HDL CHOLESTEROL: 103 MG/DL
POTASSIUM, SERUM: 3.6 MEQ/L
PROTEIN, TOTAL: 6.1 G/DL
SGOT (AST): 30 IU/L
SGPT (ALT): 35 IU/L
SODIUM: 138 MEQ/L
THYROID STIMULATING HORMONE: 0.5 MLU/L
TRIGLYCERIDES: 54 MG/DL

## 2017-12-27 ENCOUNTER — PRIOR ORIGINAL RECORDS (OUTPATIENT)
Dept: OTHER | Age: 67
End: 2017-12-27

## 2017-12-28 ENCOUNTER — OFFICE VISIT (OUTPATIENT)
Dept: HEMATOLOGY/ONCOLOGY | Facility: HOSPITAL | Age: 67
End: 2017-12-28
Attending: INTERNAL MEDICINE
Payer: MEDICARE

## 2017-12-28 VITALS
DIASTOLIC BLOOD PRESSURE: 68 MMHG | HEIGHT: 62.25 IN | BODY MASS INDEX: 33.61 KG/M2 | RESPIRATION RATE: 16 BRPM | HEART RATE: 78 BPM | WEIGHT: 185 LBS | TEMPERATURE: 98 F | SYSTOLIC BLOOD PRESSURE: 136 MMHG

## 2017-12-28 DIAGNOSIS — C50.411 MALIGNANT NEOPLASM OF UPPER-OUTER QUADRANT OF RIGHT BREAST IN FEMALE, ESTROGEN RECEPTOR POSITIVE (HCC): Primary | ICD-10-CM

## 2017-12-28 DIAGNOSIS — Z51.81 ENCOUNTER FOR MONITORING AROMATASE INHIBITOR THERAPY: ICD-10-CM

## 2017-12-28 DIAGNOSIS — Z98.890 HISTORY OF VITRECTOMY: ICD-10-CM

## 2017-12-28 DIAGNOSIS — E10.51 DM (DIABETES MELLITUS) TYPE I CONTROLLED, PERIPHERAL VASCULAR DISORDER: ICD-10-CM

## 2017-12-28 DIAGNOSIS — Z79.811 ENCOUNTER FOR MONITORING AROMATASE INHIBITOR THERAPY: ICD-10-CM

## 2017-12-28 DIAGNOSIS — Z17.0 MALIGNANT NEOPLASM OF UPPER-OUTER QUADRANT OF RIGHT BREAST IN FEMALE, ESTROGEN RECEPTOR POSITIVE (HCC): Primary | ICD-10-CM

## 2017-12-28 PROCEDURE — 99214 OFFICE O/P EST MOD 30 MIN: CPT | Performed by: INTERNAL MEDICINE

## 2017-12-28 RX ORDER — ANASTROZOLE 1 MG/1
1 TABLET ORAL
Qty: 90 TABLET | Refills: 3 | Status: SHIPPED | OUTPATIENT
Start: 2017-12-28 | End: 2019-01-14

## 2017-12-28 RX ORDER — AZITHROMYCIN 250 MG/1
TABLET, FILM COATED ORAL
Qty: 6 TABLET | Refills: 0 | Status: SHIPPED | OUTPATIENT
Start: 2017-12-28 | End: 2018-01-23

## 2017-12-28 NOTE — PROGRESS NOTES
JEFF Betancur is a 75year old female with ER 87% TX 34% Ki-67 48% Her 2 magraux negative, infiltrating ductal  [id number] left breast cancer. Patient was treated with CMF, radiation therapy, and a sequence of aromatase inhibitor medications.   She is c Cardiovascular: Positive for leg swelling. Negative for chest pain and palpitations. Gastrointestinal: Negative for abdominal pain, constipation, diarrhea, nausea and vomiting.         Still having gerd taking omeprazole for it   Endocrine:        Known d Calcipotriene 0.005 % Apply Externally Ointment  Disp:  Rfl: 5   Fluocinonide 0.05 % Apply Externally Ointment  Disp:  Rfl: 3   promethazine-codeine 6.25-10 MG/5ML Oral Syrup Take 5 mL by mouth every 6 (six) hours as needed for cough.  Disp: 118 mL Rfl: 0 10/07/2009: NEEDLE CORE BIOPSY, BREAST (DMG) Right      Comment: RIGHT breast nodules; mammatome core needle                biopsies; 2009  No date: REDUCTION LEFT    Social History  Social History   Marital status:   Spouse name: N/A    Years of e Psychiatric: She has a normal mood and affect. Her behavior is normal. Judgment and thought content normal.   Nursing note and vitals reviewed.           ASSESSMENT/PLAN:   Malignant neoplasm of upper-outer quadrant of right breast in female, estrogen recep CHOLESTEROL, TOTAL Latest Ref Range: 110 - 200 mg/dL 158   Triglycerides Latest Ref Range: 1 - 149 mg/dL 54   HDL Cholesterol Latest Units: mg/dL 55   NON HDL CHOL Latest Ref Range: <130 mg/dL 103   LDL Cholesterol Calc Latest Ref Range: 0 - 99 mg/dL 92 There is a stellate density in the left upper-outer quadrant consistent with post treatment scar. This contains coarse internal dystrophic calcifications. There is overlying breast deformity and anterior skin thickening.  Additional benign calc

## 2018-01-18 ENCOUNTER — TELEPHONE (OUTPATIENT)
Dept: OPTOMETRY | Facility: CLINIC | Age: 68
End: 2018-01-18

## 2018-01-18 NOTE — TELEPHONE ENCOUNTER
Pt requesting to speak with PPS in regards to wearing contacts after her upcoming cataract surgery on 02/06/18. Please advise. Thank you.

## 2018-01-19 NOTE — TELEPHONE ENCOUNTER
Having phaco with Dr. Abdon Pearce in Feb. Wants contact check after so made appointment for 2-9-18 at 9 am.

## 2018-01-23 ENCOUNTER — OFFICE VISIT (OUTPATIENT)
Dept: FAMILY MEDICINE CLINIC | Facility: CLINIC | Age: 68
End: 2018-01-23

## 2018-01-23 VITALS
TEMPERATURE: 98 F | DIASTOLIC BLOOD PRESSURE: 68 MMHG | HEIGHT: 62.25 IN | HEART RATE: 80 BPM | OXYGEN SATURATION: 97 % | WEIGHT: 183 LBS | RESPIRATION RATE: 12 BRPM | SYSTOLIC BLOOD PRESSURE: 124 MMHG | BODY MASS INDEX: 33.25 KG/M2

## 2018-01-23 DIAGNOSIS — R05.3 PERSISTENT COUGH FOR 3 WEEKS OR LONGER: ICD-10-CM

## 2018-01-23 DIAGNOSIS — J40 BRONCHITIS: Primary | ICD-10-CM

## 2018-01-23 PROCEDURE — 99213 OFFICE O/P EST LOW 20 MIN: CPT | Performed by: FAMILY MEDICINE

## 2018-01-23 RX ORDER — ALBUTEROL SULFATE 90 UG/1
2 AEROSOL, METERED RESPIRATORY (INHALATION) EVERY 4 HOURS PRN
Qty: 1 INHALER | Refills: 1 | Status: SHIPPED | OUTPATIENT
Start: 2018-01-23

## 2018-01-23 RX ORDER — PROMETHAZINE HYDROCHLORIDE AND CODEINE PHOSPHATE 6.25; 1 MG/5ML; MG/5ML
5 SYRUP ORAL EVERY 6 HOURS PRN
Qty: 118 ML | Refills: 0 | Status: SHIPPED | OUTPATIENT
Start: 2018-01-23 | End: 2018-03-26 | Stop reason: ALTCHOICE

## 2018-01-23 NOTE — PROGRESS NOTES
HPI:    Patient ID: Cinthia Menon is a 75year old female.     Cough  -for past 4-5wk  -occasionally productive clear yellow sputum  -no congestion, rhinorrhea, sore throat, body aches, f/c, n/v/d  -got zpack  4wks ago and did not improve  -saw oncologist breakfast.   Disp:  Rfl: 1   aspirin 81 MG Oral Chew Tab Chew  by mouth. Disp:  Rfl:    Benazepril-Hydrochlorothiazide (LOTENSIN HCT) 10-12.5 MG Oral Tab Take 1 tablet by mouth daily.    Disp:  Rfl:    Omeprazole 40 MG Oral Capsule Delayed Release Take 40 m placed in this encounter. Meds This Visit:  Signed Prescriptions Disp Refills    promethazine-codeine 6.25-10 MG/5ML Oral Syrup 118 mL 0      Sig: Take 5 mL by mouth every 6 (six) hours as needed for cough.       Albuterol Sulfate HFA (PROAIR HFA) 108

## 2018-01-23 NOTE — PATIENT INSTRUCTIONS
Chronic Cough with Uncertain Cause (Adult)    Everyone has had a cough as part of the common cold, flu, or bronchitis. This kind of cough occurs along with an achy feeling, low-grade fever, nasal and sinus congestion, and a scratchy or sore throat.  This Let your healthcare provider know if you are taking any of these. Asthma  Cough may be the only sign of mild asthma. You may have tests to find out if asthma is causing your cough. You may also take asthma medicine on a trial basis.   Acid reflux (heartbur © 9059-4408 [employer] [address]. [address] [facility], [facility] [employer] [place]. All rights reserved. This information is not intended as a substitute for professional medical care. Always follow your healthcare professional's instructions.         Viral B · Your appetite may be poor, so a light diet is fine. Avoid dehydration by drinking 6 to 8 glasses of fluids per day (such as water, soft drinks, sports drinks, juices, tea, or soup). Extra fluids will help loosen secretions in the nose and lungs.   · Over-

## 2018-01-31 ENCOUNTER — SNF/IP PROF CHARGE ONLY (OUTPATIENT)
Dept: HEMATOLOGY/ONCOLOGY | Facility: HOSPITAL | Age: 68
End: 2018-01-31

## 2018-01-31 DIAGNOSIS — C50.412 MALIGNANT NEOPLASM OF UPPER-OUTER QUADRANT OF LEFT BREAST IN FEMALE, ESTROGEN RECEPTOR POSITIVE (HCC): ICD-10-CM

## 2018-01-31 DIAGNOSIS — Z17.0 MALIGNANT NEOPLASM OF UPPER-OUTER QUADRANT OF LEFT BREAST IN FEMALE, ESTROGEN RECEPTOR POSITIVE (HCC): ICD-10-CM

## 2018-01-31 PROCEDURE — G9678 ONCOLOGY CARE MODEL SERVICE: HCPCS | Performed by: INTERNAL MEDICINE

## 2018-02-01 ENCOUNTER — HOSPITAL ENCOUNTER (OUTPATIENT)
Dept: GENERAL RADIOLOGY | Facility: HOSPITAL | Age: 68
Discharge: HOME | End: 2018-02-01
Attending: FAMILY MEDICINE
Payer: MEDICARE

## 2018-02-01 DIAGNOSIS — R05.3 PERSISTENT COUGH FOR 3 WEEKS OR LONGER: ICD-10-CM

## 2018-02-01 PROCEDURE — 71046 X-RAY EXAM CHEST 2 VIEWS: CPT | Performed by: FAMILY MEDICINE

## 2018-02-09 ENCOUNTER — OFFICE VISIT (OUTPATIENT)
Dept: OPTOMETRY | Facility: CLINIC | Age: 68
End: 2018-02-09

## 2018-02-09 DIAGNOSIS — H25.11 AGE-RELATED NUCLEAR CATARACT OF RIGHT EYE: Primary | ICD-10-CM

## 2018-02-09 DIAGNOSIS — H52.13 MYOPIA, BILATERAL: ICD-10-CM

## 2018-02-09 PROCEDURE — 92015 DETERMINE REFRACTIVE STATE: CPT | Performed by: OPTOMETRIST

## 2018-02-09 PROCEDURE — 99213 OFFICE O/P EST LOW 20 MIN: CPT | Performed by: OPTOMETRIST

## 2018-02-09 NOTE — ASSESSMENT & PLAN NOTE
I gave patient an RX for overglasses as a TEMPORARY measure so that she can be safe driving until her post op in a month. She knows that RX WILL change. Cannot discuss contact until her one month post op with [address] [place] [address] ophthalmology.  Patient asked why I ashanti

## 2018-02-09 NOTE — PROGRESS NOTES
Chelsea Olson is a 75year old female. HPI:     HPI     Patient had phaco OS with  implant at Dr. Syed Green office on 2-6-18. Patient saw him for a post op on Wednesday and she states that she pinholed to 20/30. Left eye feels a little strained.  Patient i 05/29/2009   • Trigger finger of both hands 12/10/2012       Surgical History: Rony Esposito has a past surgical history that includes lumpectomy left (Left, 01/16/2009); needle core biopsy, breast (dmg) (Right, 10/07/2009) (RIGHT breast nodules; mammato Disp:  Rfl:    Benazepril-Hydrochlorothiazide (LOTENSIN HCT) 10-12.5 MG Oral Tab Take 1 tablet by mouth daily. Disp:  Rfl:    Omeprazole 40 MG Oral Capsule Delayed Release Take 40 mg by mouth daily.    Disp:  Rfl:    Calcipotriene 0.005 % Apply Externally WILL change. Cannot discuss contact until her one month post op with [address] [place] [address] ophthalmology. Patient asked why I cannot give her glasses with her -8.00 power in the right lens and -1.00 in the left . I explained anisometropia .  RTO one month for a recheck an

## 2018-02-09 NOTE — PROGRESS NOTES
Brain Fong is a 75year old female. HPI:     HPI     Patient had phaco OS with  implant at Dr. Odonnell Leisure office on 2-6-18. Patient saw him for a post op on Wednesday and she states that she pinholed to 20/30. Left eye feels a little strained.  Patient i 05/29/2009   • Trigger finger of both hands 12/10/2012       Surgical History: Peterson Cortez has a past surgical history that includes lumpectomy left (Left, 01/16/2009); needle core biopsy, breast (dmg) (Right, 10/07/2009) (RIGHT breast nodules; mammato Disp:  Rfl:    Benazepril-Hydrochlorothiazide (LOTENSIN HCT) 10-12.5 MG Oral Tab Take 1 tablet by mouth daily. Disp:  Rfl:    Omeprazole 40 MG Oral Capsule Delayed Release Take 40 mg by mouth daily.    Disp:  Rfl:    Calcipotriene 0.005 % Apply Externally WILL change. Cannot discuss contact until her one month post op with [address] [place] [address] ophthalmology. Patient asked why I cannot give her glasses with her -8.00 power in the right lens and -1.00 in the left . I explained anisometropia .  RTO one month for a recheck an

## 2018-02-09 NOTE — PATIENT INSTRUCTIONS
Myopia, bilateral  I gave patient an RX for overglasses as a TEMPORARY measure so that she can be safe driving until her post op in a month. She knows that RX WILL change. Cannot discuss contact until her one month post op with [address] [place] [address] ophthalmology.  Amador

## 2018-02-09 NOTE — PROGRESS NOTES
Eric Ruiz is a 75year old female. HPI:     HPI     Patient had phaco OS with  implant at Dr. Tabitha Berry office on 2-6-18. Patient saw him for a post op on Wednesday and she states that she pinholed to 20/30. Left eye feels a little strained.  Patient i 05/29/2009   • Trigger finger of both hands 12/10/2012       Surgical History: Paul Hernandez has a past surgical history that includes lumpectomy left (Left, 01/16/2009); needle core biopsy, breast (dmg) (Right, 10/07/2009) (RIGHT breast nodules; mammato Disp:  Rfl:    Benazepril-Hydrochlorothiazide (LOTENSIN HCT) 10-12.5 MG Oral Tab Take 1 tablet by mouth daily. Disp:  Rfl:    Omeprazole 40 MG Oral Capsule Delayed Release Take 40 mg by mouth daily.    Disp:  Rfl:    Calcipotriene 0.005 % Apply Externally WILL change. Cannot discuss contact until her one month post op with [address] [place] [address] ophthalmology. Patient asked why I cannot give her glasses with her -8.00 power in the right lens and -1.00 in the left . I explained anisometropia .  RTO one month for a recheck an

## 2018-02-15 ENCOUNTER — TELEPHONE (OUTPATIENT)
Dept: OPTOMETRY | Facility: CLINIC | Age: 68
End: 2018-02-15

## 2018-02-15 NOTE — TELEPHONE ENCOUNTER
Pt requesting to speak to Dr Rochelle Sim, as she lost her R contact, and she has questions. Pt. States that she just had cataract surgery and is not able to see well, so she is not able to drive.

## 2018-02-23 ENCOUNTER — TELEPHONE (OUTPATIENT)
Dept: OPTOMETRY | Facility: CLINIC | Age: 68
End: 2018-02-23

## 2018-02-23 NOTE — TELEPHONE ENCOUNTER
Patient scheduled an appt for 03/09/18 at 1pm wants to be seen sooner than that. Added her to a wait list but also wants to send a message to see if she can be squeezed in sooner. Please call. Thank you.

## 2018-02-23 NOTE — TELEPHONE ENCOUNTER
She is going to see Dr. Severa Mount for her one month post op a few days before. I want to make sure she is OK to fit with contact again OS.  I want to see her AFTER she sees Dr. Severa Mount.

## 2018-02-28 ENCOUNTER — SNF/IP PROF CHARGE ONLY (OUTPATIENT)
Dept: HEMATOLOGY/ONCOLOGY | Facility: HOSPITAL | Age: 68
End: 2018-02-28

## 2018-02-28 DIAGNOSIS — Z17.0 MALIGNANT NEOPLASM OF UPPER-OUTER QUADRANT OF LEFT BREAST IN FEMALE, ESTROGEN RECEPTOR POSITIVE (HCC): ICD-10-CM

## 2018-02-28 DIAGNOSIS — C50.412 MALIGNANT NEOPLASM OF UPPER-OUTER QUADRANT OF LEFT BREAST IN FEMALE, ESTROGEN RECEPTOR POSITIVE (HCC): ICD-10-CM

## 2018-02-28 PROCEDURE — G9678 ONCOLOGY CARE MODEL SERVICE: HCPCS | Performed by: INTERNAL MEDICINE

## 2018-03-09 ENCOUNTER — OFFICE VISIT (OUTPATIENT)
Dept: OPTOMETRY | Facility: CLINIC | Age: 68
End: 2018-03-09

## 2018-03-09 DIAGNOSIS — H52.13 MYOPIA, BILATERAL: Primary | ICD-10-CM

## 2018-03-09 PROCEDURE — 92012 INTRM OPH EXAM EST PATIENT: CPT | Performed by: OPTOMETRIST

## 2018-03-09 NOTE — PROGRESS NOTES
Brain Fong is a 75year old female. HPI:     HPI     Patient is in for a  contact lens fit for the left eye. She had surgery OS at Dr. Belle Mendez office on 2-8-18 and saw him for a post-op on 3-1-18 and was given a new glasses RX.  I gave her a Temporary Premature   • Heart Disease Father      (cause of death)   • Breast Cancer Self 63       Social History: Smoking status: Never Smoker                                                              Smokeless tobacco: Never Used                      Alcohol us ROS: PHYSICAL EXAM:     Base Eye Exam     Visual Acuity (Snellen - Linear)       Right Left    Dist cc 20/200 20/25 -2    Correction:  Glasses          Pupils       Pupils    Right PERRL    Left PERRL          Neuro/Psych     Oriented x3:   Yes

## 2018-03-13 ENCOUNTER — TELEPHONE (OUTPATIENT)
Dept: OPTOMETRY | Facility: CLINIC | Age: 68
End: 2018-03-13

## 2018-03-13 ENCOUNTER — PRIOR ORIGINAL RECORDS (OUTPATIENT)
Dept: OTHER | Age: 68
End: 2018-03-13

## 2018-03-13 ENCOUNTER — MYAURORA ACCOUNT LINK (OUTPATIENT)
Dept: OTHER | Age: 68
End: 2018-03-13

## 2018-03-13 NOTE — TELEPHONE ENCOUNTER
Patient requesting to speak with Dr Ruth Duarte. States has questions about contacts that she ordered. Please call.  Thank you,

## 2018-03-22 ENCOUNTER — LAB ENCOUNTER (OUTPATIENT)
Dept: LAB | Facility: HOSPITAL | Age: 68
End: 2018-03-22
Attending: INTERNAL MEDICINE
Payer: MEDICARE

## 2018-03-22 ENCOUNTER — PRIOR ORIGINAL RECORDS (OUTPATIENT)
Dept: OTHER | Age: 68
End: 2018-03-22

## 2018-03-22 DIAGNOSIS — E10.9 DIABETES MELLITUS TYPE I (HCC): ICD-10-CM

## 2018-03-22 DIAGNOSIS — I25.10 CAD (CORONARY ARTERY DISEASE): Primary | ICD-10-CM

## 2018-03-22 DIAGNOSIS — E03.9 HYPOTHYROIDISM: ICD-10-CM

## 2018-03-22 LAB
ALBUMIN SERPL BCP-MCNC: 3.2 G/DL (ref 3.5–4.8)
ALBUMIN/GLOB SERPL: 1.1 {RATIO} (ref 1–2)
ALP SERPL-CCNC: 113 U/L (ref 32–100)
ALT SERPL-CCNC: 40 U/L (ref 14–54)
ANION GAP SERPL CALC-SCNC: 5 MMOL/L (ref 0–18)
AST SERPL-CCNC: 39 U/L (ref 15–41)
BILIRUB SERPL-MCNC: 0.6 MG/DL (ref 0.3–1.2)
BUN SERPL-MCNC: 13 MG/DL (ref 8–20)
BUN/CREAT SERPL: 19.7 (ref 10–20)
CALCIUM SERPL-MCNC: 9.1 MG/DL (ref 8.5–10.5)
CHLORIDE SERPL-SCNC: 104 MMOL/L (ref 95–110)
CHOLEST SERPL-MCNC: 149 MG/DL (ref 110–200)
CK SERPL-CCNC: 69 U/L (ref 38–234)
CO2 SERPL-SCNC: 31 MMOL/L (ref 22–32)
CREAT SERPL-MCNC: 0.66 MG/DL (ref 0.5–1.5)
CREAT UR-MCNC: 100.3 MG/DL
GLOBULIN PLAS-MCNC: 2.8 G/DL (ref 2.5–3.7)
GLUCOSE SERPL-MCNC: 175 MG/DL (ref 70–99)
HBA1C MFR BLD: 8.5 % (ref 4–6)
HDLC SERPL-MCNC: 63 MG/DL
LDLC SERPL CALC-MCNC: 77 MG/DL (ref 0–99)
MICROALBUMIN UR-MCNC: 0.8 MG/DL (ref 0–1.8)
MICROALBUMIN/CREAT UR: 8 MG/G{CREAT} (ref 0–20)
NONHDLC SERPL-MCNC: 86 MG/DL
OSMOLALITY UR CALC.SUM OF ELEC: 294 MOSM/KG (ref 275–295)
PATIENT FASTING: YES
POTASSIUM SERPL-SCNC: 4.2 MMOL/L (ref 3.3–5.1)
PROT SERPL-MCNC: 6 G/DL (ref 5.9–8.4)
SODIUM SERPL-SCNC: 140 MMOL/L (ref 136–144)
T4 FREE SERPL-MCNC: 1.63 NG/DL (ref 0.58–1.64)
TRIGL SERPL-MCNC: 46 MG/DL (ref 1–149)
TSH SERPL-ACNC: 0.07 UIU/ML (ref 0.45–5.33)

## 2018-03-22 PROCEDURE — 82043 UR ALBUMIN QUANTITATIVE: CPT

## 2018-03-22 PROCEDURE — 83036 HEMOGLOBIN GLYCOSYLATED A1C: CPT

## 2018-03-22 PROCEDURE — 84443 ASSAY THYROID STIM HORMONE: CPT

## 2018-03-22 PROCEDURE — 82550 ASSAY OF CK (CPK): CPT

## 2018-03-22 PROCEDURE — 36415 COLL VENOUS BLD VENIPUNCTURE: CPT

## 2018-03-22 PROCEDURE — 84439 ASSAY OF FREE THYROXINE: CPT

## 2018-03-22 PROCEDURE — 80053 COMPREHEN METABOLIC PANEL: CPT

## 2018-03-22 PROCEDURE — 80061 LIPID PANEL: CPT

## 2018-03-22 PROCEDURE — 82570 ASSAY OF URINE CREATININE: CPT

## 2018-03-23 LAB
ALBUMIN: 3.2 G/DL
ALKALINE PHOSPHATATE(ALK PHOS): 113 IU/L
ALT (SGPT): 40 U/L
AST (SGOT): 39 U/L
BILIRUBIN TOTAL: 0.6 MG/DL
BUN: 13 MG/DL
CALCIUM: 9.1 MG/DL
CHLORIDE: 104 MEQ/L
CHOLESTEROL, TOTAL: 149 MG/DL
CHOLESTEROL, TOTAL: 149 MG/DL
CREATININE KINASE: 69 U/L
CREATININE, SERUM: 0.66 MG/DL
FREE T4: 1.63 MG/DL
GLOBULIN: 2.8 G/DL
GLUCOSE: 175 MG/DL
GLUCOSE: 175 MG/DL
HDL CHOLESTEROL: 63 MG/DL
HDL CHOLESTEROL: 63 MG/DL
HEMOGLOBIN A1C: 8.5 %
LDL CHOLESTEROL: 77 MG/DL
LDL CHOLESTEROL: 77 MG/DL
POTASSIUM, SERUM: 4.2 MEQ/L
PROTEIN, TOTAL: 6 G/DL
SGOT (AST): 39 IU/L
SGPT (ALT): 40 IU/L
SODIUM: 140 MEQ/L
THYROID STIMULATING HORMONE: 0.07 MLU/L
TRIGLYCERIDES: 46 MG/DL
TRIGLYCERIDES: 46 MG/DL

## 2018-03-26 ENCOUNTER — PRIOR ORIGINAL RECORDS (OUTPATIENT)
Dept: OTHER | Age: 68
End: 2018-03-26

## 2018-03-26 ENCOUNTER — OFFICE VISIT (OUTPATIENT)
Dept: INTERNAL MEDICINE CLINIC | Facility: CLINIC | Age: 68
End: 2018-03-26

## 2018-03-26 VITALS
DIASTOLIC BLOOD PRESSURE: 68 MMHG | WEIGHT: 186.81 LBS | RESPIRATION RATE: 18 BRPM | SYSTOLIC BLOOD PRESSURE: 126 MMHG | TEMPERATURE: 98 F | HEIGHT: 62.25 IN | OXYGEN SATURATION: 98 % | BODY MASS INDEX: 33.94 KG/M2 | HEART RATE: 73 BPM

## 2018-03-26 DIAGNOSIS — T78.40XA ALLERGIC REACTION TO DRUG, INITIAL ENCOUNTER: Primary | ICD-10-CM

## 2018-03-26 PROCEDURE — 96372 THER/PROPH/DIAG INJ SC/IM: CPT | Performed by: INTERNAL MEDICINE

## 2018-03-26 PROCEDURE — 99213 OFFICE O/P EST LOW 20 MIN: CPT | Performed by: INTERNAL MEDICINE

## 2018-03-26 RX ORDER — PREDNISONE 10 MG/1
10 TABLET ORAL DAILY
Qty: 5 TABLET | Refills: 0 | Status: SHIPPED | OUTPATIENT
Start: 2018-03-26 | End: 2018-05-14 | Stop reason: ALTCHOICE

## 2018-03-26 RX ORDER — METHYLPREDNISOLONE SODIUM SUCCINATE 125 MG/2ML
125 INJECTION, POWDER, LYOPHILIZED, FOR SOLUTION INTRAMUSCULAR; INTRAVENOUS ONCE
Status: COMPLETED | OUTPATIENT
Start: 2018-03-26 | End: 2018-03-26

## 2018-03-26 RX ORDER — POLYMYXIN B SULFATE AND TRIMETHOPRIM 1; 10000 MG/ML; [USP'U]/ML
SOLUTION OPHTHALMIC
Refills: 0 | COMMUNITY
Start: 2018-02-02 | End: 2018-05-14 | Stop reason: ALTCHOICE

## 2018-03-26 NOTE — PROGRESS NOTES
HPI:    Patient ID: Neelima Navarro is a 70year old female. Pt here for ma severe erythrodermic reaction from Clindamicin dating back to last week Wednesday. Still is very itchy and red on the chest wall especially.     Review of Systems   Skin: Luis Antonio Vann Unknown  Sulfa Antibiotics          PHYSICAL EXAM:   Physical Exam    Constitutional: She is obese. She appears well-developed. HENT:   Head: Normocephalic.   erythemetous rash on the cheeks   Eyes: Pupils are equal, round, and reactive to light.  No scle

## 2018-03-31 ENCOUNTER — SNF/IP PROF CHARGE ONLY (OUTPATIENT)
Dept: HEMATOLOGY/ONCOLOGY | Facility: HOSPITAL | Age: 68
End: 2018-03-31

## 2018-03-31 DIAGNOSIS — Z17.0 MALIGNANT NEOPLASM OF UPPER-OUTER QUADRANT OF LEFT BREAST IN FEMALE, ESTROGEN RECEPTOR POSITIVE (HCC): ICD-10-CM

## 2018-03-31 DIAGNOSIS — C50.412 MALIGNANT NEOPLASM OF UPPER-OUTER QUADRANT OF LEFT BREAST IN FEMALE, ESTROGEN RECEPTOR POSITIVE (HCC): ICD-10-CM

## 2018-03-31 PROCEDURE — G9678 ONCOLOGY CARE MODEL SERVICE: HCPCS | Performed by: INTERNAL MEDICINE

## 2018-04-25 ENCOUNTER — PRIOR ORIGINAL RECORDS (OUTPATIENT)
Dept: OTHER | Age: 68
End: 2018-04-25

## 2018-04-30 ENCOUNTER — SNF/IP PROF CHARGE ONLY (OUTPATIENT)
Dept: HEMATOLOGY/ONCOLOGY | Facility: HOSPITAL | Age: 68
End: 2018-04-30

## 2018-04-30 DIAGNOSIS — C50.412 MALIGNANT NEOPLASM OF UPPER-OUTER QUADRANT OF LEFT BREAST IN FEMALE, ESTROGEN RECEPTOR POSITIVE (HCC): ICD-10-CM

## 2018-04-30 DIAGNOSIS — Z17.0 MALIGNANT NEOPLASM OF UPPER-OUTER QUADRANT OF LEFT BREAST IN FEMALE, ESTROGEN RECEPTOR POSITIVE (HCC): ICD-10-CM

## 2018-04-30 PROCEDURE — G9678 ONCOLOGY CARE MODEL SERVICE: HCPCS | Performed by: INTERNAL MEDICINE

## 2018-05-14 ENCOUNTER — OFFICE VISIT (OUTPATIENT)
Dept: INTERNAL MEDICINE CLINIC | Facility: CLINIC | Age: 68
End: 2018-05-14

## 2018-05-14 VITALS
HEIGHT: 62.25 IN | WEIGHT: 187 LBS | DIASTOLIC BLOOD PRESSURE: 78 MMHG | HEART RATE: 76 BPM | TEMPERATURE: 98 F | RESPIRATION RATE: 17 BRPM | SYSTOLIC BLOOD PRESSURE: 132 MMHG | OXYGEN SATURATION: 98 % | BODY MASS INDEX: 33.98 KG/M2

## 2018-05-14 DIAGNOSIS — E78.2 MIXED HYPERLIPIDEMIA: ICD-10-CM

## 2018-05-14 DIAGNOSIS — Z85.3 HX OF BREAST CANCER: ICD-10-CM

## 2018-05-14 DIAGNOSIS — E10.36 TYPE 1 DIABETES MELLITUS WITH DIABETIC CATARACT (HCC): ICD-10-CM

## 2018-05-14 DIAGNOSIS — Z00.00 MEDICARE ANNUAL WELLNESS VISIT, SUBSEQUENT: Primary | ICD-10-CM

## 2018-05-14 DIAGNOSIS — E03.9 ACQUIRED HYPOTHYROIDISM: ICD-10-CM

## 2018-05-14 PROBLEM — Z98.890 HISTORY OF VITRECTOMY: Status: RESOLVED | Noted: 2017-11-29 | Resolved: 2018-05-14

## 2018-05-14 PROBLEM — H25.11 AGE-RELATED NUCLEAR CATARACT OF RIGHT EYE: Status: RESOLVED | Noted: 2018-02-09 | Resolved: 2018-05-14

## 2018-05-14 PROCEDURE — G0439 PPPS, SUBSEQ VISIT: HCPCS | Performed by: INTERNAL MEDICINE

## 2018-05-14 RX ORDER — ATORVASTATIN CALCIUM 40 MG/1
40 TABLET, FILM COATED ORAL NIGHTLY
Qty: 90 TABLET | Refills: 3 | Status: SHIPPED | OUTPATIENT
Start: 2018-05-14 | End: 2018-11-16

## 2018-05-14 NOTE — PROGRESS NOTES
HPI:    Patient ID: Rony Esposito is a 70year old female. Pt here for AWV and fu on Dmtype 1 brittle, hypothyroidism htn and hyperlipidemia. Has just recently had catarct surgery complicated by an allergic reaction to clindamicin.     HPI:   Glynn Shaw Problems? : No    Difficulty walking?: No    Difficulty dressing or bathing?: No    Problems with daily activities? : No    Memory Problems?: No      Fall/Risk Assessment          Have you fallen in the last 12 months?: 0-No Rfl: 2   SANJU CONTOUR NEXT TEST In Vitro Strip  Disp:  Rfl: 2   simvastatin (ZOCOR) 40 MG Oral Tab  Disp:  Rfl: 11   NOVOLOG 100 UNIT/ML Subcutaneous Solution  Disp:  Rfl: 4   Levothyroxine Sodium (SYNTHROID, LEVOTHROID) 137 MCG Oral Tab Take 137 mcg by m RIGHT  10/07/2009: NEEDLE CORE BIOPSY, BREAST (DMG) Right      Comment: RIGHT breast nodules; mammatome core needle                biopsies; 2009  No date: REDUCTION LEFT   Family History   Problem Relation Age of Onset   • Heart Disease Mother      CAD - normocephalic, ears and throat with post nasal drip     EYES: PERRLA, EOMI, conjunctiva are clear  Right Eye Visual Acuity: Uncorrected Left Eye Visual Acuity: Uncorrected   Right Eye Chart Acuity: 20/40 Left Eye Chart Acuity: 20/30   NECK: supple, no tessie years Colonoscopy,10 Years due on 03/21/1999 Update Health Maintenance if applicable    Flex Sigmoidoscopy Screen every 10 years No results found for this or any previous visit. No flowsheet data found.      Fecal Occult Blood Annually No results found for: flowsheet data found. BUN  Annually BUN (mg/dL)   Date Value   03/22/2018 13   07/08/2016 14    No flowsheet data found. Drug Serum Conc  Annually No results found for: DIGOXIN, DIG, VALP No flowsheet data found.     Diabetes      HgbA1C  Annually GL Administered    None  Deferred                Date(s) Deferred    Fluad High dose 65 yr and older (08269)                          03/26/2018      Pneumococcal (Prevnar 13)                          03/26/2018        Review of Systems           Current Outp diabetic cataract (hcc) controlled on pump - brittle disease  Mixed hyperlipidemia on simvasatin and zetia  Acquired hypothyroidism on levothyroxine  Hx of breast cancer on aromidex    No orders of the defined types were placed in this encounter.       Meds

## 2018-05-21 ENCOUNTER — TELEPHONE (OUTPATIENT)
Dept: OPTOMETRY | Facility: CLINIC | Age: 68
End: 2018-05-21

## 2018-05-31 ENCOUNTER — SNF/IP PROF CHARGE ONLY (OUTPATIENT)
Dept: HEMATOLOGY/ONCOLOGY | Facility: HOSPITAL | Age: 68
End: 2018-05-31

## 2018-05-31 DIAGNOSIS — C50.412 MALIGNANT NEOPLASM OF UPPER-OUTER QUADRANT OF LEFT BREAST IN FEMALE, ESTROGEN RECEPTOR POSITIVE (HCC): ICD-10-CM

## 2018-05-31 DIAGNOSIS — Z17.0 MALIGNANT NEOPLASM OF UPPER-OUTER QUADRANT OF LEFT BREAST IN FEMALE, ESTROGEN RECEPTOR POSITIVE (HCC): ICD-10-CM

## 2018-05-31 PROCEDURE — G9678 ONCOLOGY CARE MODEL SERVICE: HCPCS | Performed by: INTERNAL MEDICINE

## 2018-06-08 ENCOUNTER — OFFICE VISIT (OUTPATIENT)
Dept: HEMATOLOGY/ONCOLOGY | Facility: HOSPITAL | Age: 68
End: 2018-06-08
Attending: INTERNAL MEDICINE
Payer: MEDICARE

## 2018-06-08 VITALS
DIASTOLIC BLOOD PRESSURE: 85 MMHG | HEART RATE: 75 BPM | SYSTOLIC BLOOD PRESSURE: 109 MMHG | HEIGHT: 62.25 IN | RESPIRATION RATE: 16 BRPM | BODY MASS INDEX: 33.61 KG/M2 | WEIGHT: 185 LBS | TEMPERATURE: 99 F

## 2018-06-08 DIAGNOSIS — Z79.811 ENCOUNTER FOR MONITORING AROMATASE INHIBITOR THERAPY: ICD-10-CM

## 2018-06-08 DIAGNOSIS — Z17.0 MALIGNANT NEOPLASM OF UPPER-OUTER QUADRANT OF LEFT BREAST IN FEMALE, ESTROGEN RECEPTOR POSITIVE (HCC): ICD-10-CM

## 2018-06-08 DIAGNOSIS — N64.4 BREAST TENDERNESS: Primary | ICD-10-CM

## 2018-06-08 DIAGNOSIS — C50.412 MALIGNANT NEOPLASM OF UPPER-OUTER QUADRANT OF LEFT BREAST IN FEMALE, ESTROGEN RECEPTOR POSITIVE (HCC): ICD-10-CM

## 2018-06-08 DIAGNOSIS — M85.88 OSTEOPENIA OF LUMBAR SPINE: ICD-10-CM

## 2018-06-08 DIAGNOSIS — Z51.81 ENCOUNTER FOR MONITORING AROMATASE INHIBITOR THERAPY: ICD-10-CM

## 2018-06-08 PROCEDURE — 99214 OFFICE O/P EST MOD 30 MIN: CPT | Performed by: INTERNAL MEDICINE

## 2018-06-10 PROBLEM — Z51.81 ENCOUNTER FOR MONITORING AROMATASE INHIBITOR THERAPY: Status: ACTIVE | Noted: 2018-06-10

## 2018-06-10 PROBLEM — N64.4 BREAST TENDERNESS IN FEMALE: Status: ACTIVE | Noted: 2018-06-10

## 2018-06-10 PROBLEM — N64.4 BREAST TENDERNESS: Status: ACTIVE | Noted: 2018-06-10

## 2018-06-10 PROBLEM — Z79.811 ENCOUNTER FOR MONITORING AROMATASE INHIBITOR THERAPY: Status: ACTIVE | Noted: 2018-06-10

## 2018-06-10 PROBLEM — M85.88 OSTEOPENIA OF LUMBAR SPINE: Status: ACTIVE | Noted: 2018-06-10

## 2018-06-10 NOTE — PROGRESS NOTES
JEFF Neely Has is a 70year old female with ER 87% AL 34% Ki-67 48% Her 2 margaux negative, infiltrating ductal  [id number] left breast cancer diagnosed in 2000-09-08 a lady sitting in front of my hand he.   Patient is seen today with complaints of increa HENT: Negative for dental problem and hearing loss. Eyes:        Contacts but uses reading glasses   Only peripheral vision in the right eye   Bilateral vitrectomies    Respiratory: Negative for cough, chest tightness and shortness of breath.     Cardiov Levothyroxine Sodium (SYNTHROID, LEVOTHROID) 137 MCG Oral Tab Take 137 mcg by mouth before breakfast.   Disp:  Rfl: 1   aspirin 81 MG Oral Chew Tab Chew  by mouth.  Disp:  Rfl:    Benazepril-Hydrochlorothiazide (LOTENSIN HCT) 10-12.5 MG Oral Tab Take 1 tabl • Osteopenia of lumbar spine 6/10/2018   • Psoriasis 06/21/2012   • Thrombus 2009    SVC thrombus;  Thrombolysis 05/29/2009   • Trigger finger of both hands 12/10/2012   • 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 4/09[facility]34     Past Surgic Abdominal: Soft. Bowel sounds are normal. She exhibits no mass. Ecchymoses secondary to insulin injections and monitoring  Insulin pump   Musculoskeletal: Normal range of motion. She exhibits no edema.    Lymphadenopathy:     She has no cervical adenopath 14 - 54 U/L 40 35   AST (SGOT)      15 - 41 U/L 39 30   ALKALINE PHOSPHATASE      32 - 100 U/L 113 (H) 97   Total Bilirubin      0.3 - 1.2 mg/dL 0.6 0.5   TOTAL PROTEIN      5.9 - 8.4 g/dL 6.0 6.1   Albumin      3.5 - 4.8 g/dL 3.2 (L) 3.2 (L)   GLOBULI The breasts are asymmetric in size, right larger than left. Linear radiopaque markers locate surgical scars in the right axillary tail, left upper outer quadrant, and left axilla. Bilateral parenchymal pattern is stable.                 There a

## 2018-06-11 ENCOUNTER — TELEPHONE (OUTPATIENT)
Dept: HEMATOLOGY/ONCOLOGY | Facility: HOSPITAL | Age: 68
End: 2018-06-11

## 2018-06-11 NOTE — TELEPHONE ENCOUNTER
Spoke with Paulo Clement, let her know that mammo was sched for tomorrow 6/12 at 8 am-- she is to arrive at [place] [address] at [facility] (green parking lot, [facility] [address] entrance). No powders, lotions or deodorants to axilla or breast areas.  Paulo Clement is agreeable and verbalized und

## 2018-06-12 ENCOUNTER — HOSPITAL ENCOUNTER (OUTPATIENT)
Dept: MAMMOGRAPHY | Facility: HOSPITAL | Age: 68
Discharge: HOME | End: 2018-06-12
Attending: INTERNAL MEDICINE
Payer: MEDICARE

## 2018-06-12 ENCOUNTER — HOSPITAL ENCOUNTER (OUTPATIENT)
Dept: ULTRASOUND IMAGING | Facility: HOSPITAL | Age: 68
Discharge: HOME | End: 2018-06-12
Attending: INTERNAL MEDICINE
Payer: MEDICARE

## 2018-06-12 DIAGNOSIS — N64.4 BREAST TENDERNESS: ICD-10-CM

## 2018-06-12 PROCEDURE — 76642 ULTRASOUND BREAST LIMITED: CPT | Performed by: INTERNAL MEDICINE

## 2018-06-12 PROCEDURE — 77065 DX MAMMO INCL CAD UNI: CPT | Performed by: INTERNAL MEDICINE

## 2018-06-26 ENCOUNTER — TELEPHONE (OUTPATIENT)
Dept: HEMATOLOGY/ONCOLOGY | Facility: HOSPITAL | Age: 68
End: 2018-06-26

## 2018-06-26 DIAGNOSIS — Z17.0 MALIGNANT NEOPLASM OF UPPER-OUTER QUADRANT OF LEFT BREAST IN FEMALE, ESTROGEN RECEPTOR POSITIVE (HCC): Primary | ICD-10-CM

## 2018-06-26 DIAGNOSIS — Z85.3 PERSONAL HISTORY OF BREAST CANCER: ICD-10-CM

## 2018-06-26 DIAGNOSIS — Z12.39 SCREENING FOR BREAST CANCER: ICD-10-CM

## 2018-06-26 DIAGNOSIS — Z92.3 HISTORY OF RADIATION THERAPY: ICD-10-CM

## 2018-06-26 DIAGNOSIS — C50.412 MALIGNANT NEOPLASM OF UPPER-OUTER QUADRANT OF LEFT BREAST IN FEMALE, ESTROGEN RECEPTOR POSITIVE (HCC): Primary | ICD-10-CM

## 2018-06-26 NOTE — TELEPHONE ENCOUNTER
Per Dr. Bijan Love instruction-- called pt and let her know that mammo showed benign findings-- bilat 2d 3d mammo due Oct 2018 -- order placed. Steph Denis verbalized understanding.

## 2018-06-30 ENCOUNTER — SNF/IP PROF CHARGE ONLY (OUTPATIENT)
Dept: HEMATOLOGY/ONCOLOGY | Facility: HOSPITAL | Age: 68
End: 2018-06-30

## 2018-06-30 DIAGNOSIS — Z17.0 MALIGNANT NEOPLASM OF UPPER-OUTER QUADRANT OF LEFT BREAST IN FEMALE, ESTROGEN RECEPTOR POSITIVE (HCC): ICD-10-CM

## 2018-06-30 DIAGNOSIS — C50.412 MALIGNANT NEOPLASM OF UPPER-OUTER QUADRANT OF LEFT BREAST IN FEMALE, ESTROGEN RECEPTOR POSITIVE (HCC): ICD-10-CM

## 2018-06-30 PROCEDURE — G9678 ONCOLOGY CARE MODEL SERVICE: HCPCS | Performed by: INTERNAL MEDICINE

## 2018-07-09 ENCOUNTER — LAB ENCOUNTER (OUTPATIENT)
Dept: LAB | Facility: HOSPITAL | Age: 68
End: 2018-07-09
Attending: INTERNAL MEDICINE
Payer: MEDICARE

## 2018-07-09 ENCOUNTER — PRIOR ORIGINAL RECORDS (OUTPATIENT)
Dept: OTHER | Age: 68
End: 2018-07-09

## 2018-07-09 DIAGNOSIS — E78.00 PURE HYPERCHOLESTEROLEMIA: Primary | ICD-10-CM

## 2018-07-09 DIAGNOSIS — Z01.818 PREOPERATIVE CLEARANCE: ICD-10-CM

## 2018-07-09 DIAGNOSIS — E10.36 TYPE 1 DIABETES MELLITUS WITH DIABETIC CATARACT (HCC): ICD-10-CM

## 2018-07-09 DIAGNOSIS — E03.9 ACQUIRED HYPOTHYROIDISM: ICD-10-CM

## 2018-07-09 LAB
ALBUMIN SERPL BCP-MCNC: 3.4 G/DL (ref 3.5–4.8)
ALBUMIN/GLOB SERPL: 1.3 {RATIO} (ref 1–2)
ALP SERPL-CCNC: 91 U/L (ref 32–100)
ALT SERPL-CCNC: 29 U/L (ref 14–54)
ANION GAP SERPL CALC-SCNC: 8 MMOL/L (ref 0–18)
AST SERPL-CCNC: 25 U/L (ref 15–41)
BASOPHILS # BLD: 0.1 K/UL (ref 0–0.2)
BASOPHILS NFR BLD: 1 %
BILIRUB SERPL-MCNC: 0.7 MG/DL (ref 0.3–1.2)
BUN SERPL-MCNC: 14 MG/DL (ref 8–20)
BUN/CREAT SERPL: 22.6 (ref 10–20)
CALCIUM SERPL-MCNC: 8.8 MG/DL (ref 8.5–10.5)
CHLORIDE SERPL-SCNC: 102 MMOL/L (ref 95–110)
CHOLEST SERPL-MCNC: 156 MG/DL (ref 110–200)
CK SERPL-CCNC: 106 U/L (ref 38–234)
CO2 SERPL-SCNC: 29 MMOL/L (ref 22–32)
CREAT SERPL-MCNC: 0.62 MG/DL (ref 0.5–1.5)
CREAT UR-MCNC: 141.4 MG/DL
EOSINOPHIL # BLD: 0.6 K/UL (ref 0–0.7)
EOSINOPHIL NFR BLD: 9 %
ERYTHROCYTE [DISTWIDTH] IN BLOOD BY AUTOMATED COUNT: 14 % (ref 11–15)
GLOBULIN PLAS-MCNC: 2.7 G/DL (ref 2.5–3.7)
GLUCOSE SERPL-MCNC: 238 MG/DL (ref 70–99)
HBA1C MFR BLD: 8.6 % (ref 4–6)
HCT VFR BLD AUTO: 40.5 % (ref 35–48)
HDLC SERPL-MCNC: 61 MG/DL
HGB BLD-MCNC: 13.3 G/DL (ref 12–16)
LDLC SERPL CALC-MCNC: 80 MG/DL (ref 0–99)
LYMPHOCYTES # BLD: 1.1 K/UL (ref 1–4)
LYMPHOCYTES NFR BLD: 16 %
MCH RBC QN AUTO: 29.7 PG (ref 27–32)
MCHC RBC AUTO-ENTMCNC: 32.8 G/DL (ref 32–37)
MCV RBC AUTO: 90.3 FL (ref 80–100)
MICROALBUMIN UR-MCNC: 1.1 MG/DL (ref 0–1.8)
MICROALBUMIN/CREAT UR: 7.8 MG/G{CREAT} (ref 0–20)
MONOCYTES # BLD: 0.6 K/UL (ref 0–1)
MONOCYTES NFR BLD: 8 %
NEUTROPHILS # BLD AUTO: 4.7 K/UL (ref 1.8–7.7)
NEUTROPHILS NFR BLD: 66 %
NONHDLC SERPL-MCNC: 95 MG/DL
OSMOLALITY UR CALC.SUM OF ELEC: 296 MOSM/KG (ref 275–295)
PATIENT FASTING: YES
PLATELET # BLD AUTO: 179 K/UL (ref 140–400)
PMV BLD AUTO: 9.9 FL (ref 7.4–10.3)
POTASSIUM SERPL-SCNC: 4.1 MMOL/L (ref 3.3–5.1)
PROT SERPL-MCNC: 6.1 G/DL (ref 5.9–8.4)
RBC # BLD AUTO: 4.48 M/UL (ref 3.7–5.4)
SODIUM SERPL-SCNC: 139 MMOL/L (ref 136–144)
TRIGL SERPL-MCNC: 73 MG/DL (ref 1–149)
TSH SERPL-ACNC: 0.19 UIU/ML (ref 0.45–5.33)
WBC # BLD AUTO: 7.2 K/UL (ref 4–11)

## 2018-07-09 PROCEDURE — 83036 HEMOGLOBIN GLYCOSYLATED A1C: CPT

## 2018-07-09 PROCEDURE — 80061 LIPID PANEL: CPT

## 2018-07-09 PROCEDURE — 82043 UR ALBUMIN QUANTITATIVE: CPT

## 2018-07-09 PROCEDURE — 84443 ASSAY THYROID STIM HORMONE: CPT

## 2018-07-09 PROCEDURE — 82550 ASSAY OF CK (CPK): CPT

## 2018-07-09 PROCEDURE — 36415 COLL VENOUS BLD VENIPUNCTURE: CPT

## 2018-07-09 PROCEDURE — 82570 ASSAY OF URINE CREATININE: CPT

## 2018-07-09 PROCEDURE — 85025 COMPLETE CBC W/AUTO DIFF WBC: CPT

## 2018-07-09 PROCEDURE — 80053 COMPREHEN METABOLIC PANEL: CPT

## 2018-07-10 LAB
ALBUMIN: 3.4 G/DL
ALKALINE PHOSPHATATE(ALK PHOS): 91 IU/L
ALT (SGPT): 29 U/L
AST (SGOT): 25 U/L
BILIRUBIN TOTAL: 0.7 MG/DL
BUN: 14 MG/DL
CALCIUM: 8.8 MG/DL
CHLORIDE: 102 MEQ/L
CHOLESTEROL, TOTAL: 156 MG/DL
CREATININE KINASE: 106 U/L
CREATININE, SERUM: 0.62 MG/DL
GLOBULIN: 2.7 G/DL
GLUCOSE: 238 MG/DL
GLUCOSE: 238 MG/DL
HDL CHOLESTEROL: 61 MG/DL
LDL CHOLESTEROL: 80 MG/DL
POTASSIUM, SERUM: 4.1 MEQ/L
PROTEIN, TOTAL: 6.1 G/DL
SGOT (AST): 25 IU/L
SGPT (ALT): 29 IU/L
SODIUM: 139 MEQ/L
TRIGLYCERIDES: 73 MG/DL

## 2018-07-16 ENCOUNTER — HOSPITAL ENCOUNTER (OUTPATIENT)
Age: 68
Discharge: HOME | End: 2018-07-16
Attending: FAMILY MEDICINE
Payer: MEDICARE

## 2018-07-16 VITALS
SYSTOLIC BLOOD PRESSURE: 135 MMHG | WEIGHT: 185 LBS | TEMPERATURE: 99 F | BODY MASS INDEX: 34 KG/M2 | HEART RATE: 81 BPM | OXYGEN SATURATION: 95 % | DIASTOLIC BLOOD PRESSURE: 78 MMHG | RESPIRATION RATE: 16 BRPM

## 2018-07-16 DIAGNOSIS — K12.1 MOUTH ULCERS: ICD-10-CM

## 2018-07-16 DIAGNOSIS — J06.9 VIRAL UPPER RESPIRATORY TRACT INFECTION: Primary | ICD-10-CM

## 2018-07-16 LAB — S PYO AG THROAT QL: NEGATIVE

## 2018-07-16 PROCEDURE — 99214 OFFICE O/P EST MOD 30 MIN: CPT

## 2018-07-16 PROCEDURE — 99213 OFFICE O/P EST LOW 20 MIN: CPT

## 2018-07-16 PROCEDURE — 87430 STREP A AG IA: CPT

## 2018-07-16 NOTE — ED PROVIDER NOTES
Patient Seen in: [facility]mediate Care In [address] [place] [address]    History   Patient presents with:  Sore Throat    Stated Complaint: sore throat,fever    HPI    Patient here with sore throat for 3 days. No travel,no sick contacts .   Patient denies sig sh RIGHT  10/07/2009: NEEDLE CORE BIOPSY, BREAST (DMG) Right      Comment: RIGHT breast nodules; mammatome core needle                biopsies; 2009  No date: REDUCTION LEFT    Medications :   Lidocaine Viscous 2 % Mouth/Throat Solution,  Take 5 mL by mouth e except as noted above. PSFH elements reviewed from today and agreed except as otherwise stated in HPI.     Physical Exam   ED Triage Vitals [07/16/18 1512]  BP: 135/78  Pulse: 81  Resp: 16  Temp: 98.9 °F (37.2 °C)  Temp src: Oral  SpO2: 95 %  O2 Device:

## 2018-07-18 ENCOUNTER — PRIOR ORIGINAL RECORDS (OUTPATIENT)
Dept: OTHER | Age: 68
End: 2018-07-18

## 2018-09-18 PROBLEM — M70.62 TROCHANTERIC BURSITIS OF LEFT HIP: Status: ACTIVE | Noted: 2018-09-18

## 2018-09-18 PROBLEM — M54.32 SCIATICA OF LEFT SIDE: Status: ACTIVE | Noted: 2018-09-18

## 2018-10-01 ENCOUNTER — TELEPHONE (OUTPATIENT)
Dept: HEMATOLOGY/ONCOLOGY | Facility: HOSPITAL | Age: 68
End: 2018-10-01

## 2018-10-01 NOTE — TELEPHONE ENCOUNTER
Pipe Berumen stated, she had a mammogram about 6 months ago. She received a statement in the mail about her annual mammo.  Pipe Berumen would like to ask Dr Joanne Samson, if she needs another mammogram? Pipe Berumen can be reached at [phone] Please advise thanks

## 2018-10-02 ENCOUNTER — MED REC SCAN ONLY (OUTPATIENT)
Dept: INTERNAL MEDICINE CLINIC | Facility: CLINIC | Age: 68
End: 2018-10-02

## 2018-10-02 NOTE — TELEPHONE ENCOUNTER
Verified with Angela Chau that bilat mammo is due October 2018-- pt states she will go ahead and schedule.

## 2018-10-09 ENCOUNTER — LAB ENCOUNTER (OUTPATIENT)
Dept: LAB | Facility: HOSPITAL | Age: 68
End: 2018-10-09
Attending: INTERNAL MEDICINE
Payer: MEDICARE

## 2018-10-09 ENCOUNTER — PRIOR ORIGINAL RECORDS (OUTPATIENT)
Dept: OTHER | Age: 68
End: 2018-10-09

## 2018-10-09 DIAGNOSIS — E03.9 MYXEDEMA HEART DISEASE: ICD-10-CM

## 2018-10-09 DIAGNOSIS — E78.00 PURE HYPERCHOLESTEROLEMIA: Primary | ICD-10-CM

## 2018-10-09 DIAGNOSIS — E10.9 DIABETES MELLITUS TYPE I (HCC): ICD-10-CM

## 2018-10-09 DIAGNOSIS — I51.9 MYXEDEMA HEART DISEASE: ICD-10-CM

## 2018-10-09 PROBLEM — M48.062 SPINAL STENOSIS OF LUMBAR REGION WITH NEUROGENIC CLAUDICATION: Status: ACTIVE | Noted: 2018-10-09

## 2018-10-09 PROCEDURE — 84443 ASSAY THYROID STIM HORMONE: CPT

## 2018-10-09 PROCEDURE — 80061 LIPID PANEL: CPT

## 2018-10-09 PROCEDURE — 36415 COLL VENOUS BLD VENIPUNCTURE: CPT

## 2018-10-09 PROCEDURE — 83036 HEMOGLOBIN GLYCOSYLATED A1C: CPT

## 2018-10-09 PROCEDURE — 80053 COMPREHEN METABOLIC PANEL: CPT

## 2018-10-09 PROCEDURE — 82570 ASSAY OF URINE CREATININE: CPT

## 2018-10-09 PROCEDURE — 82550 ASSAY OF CK (CPK): CPT

## 2018-10-09 PROCEDURE — 82043 UR ALBUMIN QUANTITATIVE: CPT

## 2018-10-09 PROCEDURE — 84439 ASSAY OF FREE THYROXINE: CPT

## 2018-10-10 ENCOUNTER — PRIOR ORIGINAL RECORDS (OUTPATIENT)
Dept: OTHER | Age: 68
End: 2018-10-10

## 2018-10-10 ENCOUNTER — TELEPHONE (OUTPATIENT)
Dept: INTERNAL MEDICINE CLINIC | Facility: CLINIC | Age: 68
End: 2018-10-10

## 2018-10-10 ENCOUNTER — NURSE ONLY (OUTPATIENT)
Dept: INTERNAL MEDICINE CLINIC | Facility: CLINIC | Age: 68
End: 2018-10-10
Payer: MEDICARE

## 2018-10-10 DIAGNOSIS — N39.0 URINARY TRACT INFECTION WITHOUT HEMATURIA, SITE UNSPECIFIED: Primary | ICD-10-CM

## 2018-10-10 DIAGNOSIS — K76.9 HEPATIC DYSFUNCTION: Primary | ICD-10-CM

## 2018-10-10 DIAGNOSIS — N39.0 URINARY TRACT INFECTION WITHOUT HEMATURIA, SITE UNSPECIFIED: ICD-10-CM

## 2018-10-10 LAB
ALBUMIN: 3.3 G/DL
ALKALINE PHOSPHATATE(ALK PHOS): 1002 IU/L
ALT (SGPT): 209 U/L
AST (SGOT): 167 U/L
BILIRUBIN TOTAL: 1 MG/DL
BUN: 11 MG/DL
CALCIUM: 9.1 MG/DL
CHLORIDE: 104 MEQ/L
CHOLESTEROL, TOTAL: 155 MG/DL
CREATININE KINASE: 86 U/L
CREATININE, SERUM: 0.75 MG/DL
FREE T4: 1.64 MG/DL
GLOBULIN: 3.2 G/DL
GLUCOSE: 165 MG/DL
GLUCOSE: 165 MG/DL
HDL CHOLESTEROL: 62 MG/DL
HEMOGLOBIN A1C: 9 %
LDL CHOLESTEROL: 79 MG/DL
POTASSIUM, SERUM: 3.8 MEQ/L
PROTEIN, TOTAL: 6.5 G/DL
SGOT (AST): 167 IU/L
SGPT (ALT): 209 IU/L
SODIUM: 140 MEQ/L
THYROID STIMULATING HORMONE: 0.12 MLU/L
TRIGLYCERIDES: 68 MG/DL

## 2018-10-10 PROCEDURE — 87086 URINE CULTURE/COLONY COUNT: CPT | Performed by: INTERNAL MEDICINE

## 2018-10-10 PROCEDURE — 87088 URINE BACTERIA CULTURE: CPT | Performed by: INTERNAL MEDICINE

## 2018-10-10 PROCEDURE — 87186 SC STD MICRODIL/AGAR DIL: CPT | Performed by: INTERNAL MEDICINE

## 2018-10-10 NOTE — TELEPHONE ENCOUNTER
Patient having UTI symptoms she has experienced in the past.  Burning, frequency. Will come in to see Nurse to leave urine sample for testing.

## 2018-10-10 NOTE — TELEPHONE ENCOUNTER
Spoke to patient in person. Dr. Tuyet Owens would like patient to have a Liver US done. Also, stop taking atorvastatin and Zetia for 2 weeks. Then have lab drawn again for liver enzymes.

## 2018-10-11 ENCOUNTER — TELEPHONE (OUTPATIENT)
Dept: INTERNAL MEDICINE CLINIC | Facility: CLINIC | Age: 68
End: 2018-10-11

## 2018-10-11 NOTE — TELEPHONE ENCOUNTER
Called stating she was expecting a prescription for her UTI. Informed patient that the final results are not in yet and doctor may be waiting to get the sensitivity results before prescribing anything. We will notify her as soon as we have any information.

## 2018-10-18 ENCOUNTER — HOSPITAL ENCOUNTER (OUTPATIENT)
Dept: MAMMOGRAPHY | Facility: HOSPITAL | Age: 68
Discharge: HOME | End: 2018-10-18
Attending: INTERNAL MEDICINE
Payer: MEDICARE

## 2018-10-18 DIAGNOSIS — Z17.0 MALIGNANT NEOPLASM OF UPPER-OUTER QUADRANT OF RIGHT BREAST IN FEMALE, ESTROGEN RECEPTOR POSITIVE (HCC): ICD-10-CM

## 2018-10-18 DIAGNOSIS — E10.51 DM (DIABETES MELLITUS) TYPE I, CONTROLLED, WITH PERIPHERAL VASCULAR DISORDER (HCC): ICD-10-CM

## 2018-10-18 DIAGNOSIS — C50.411 MALIGNANT NEOPLASM OF UPPER-OUTER QUADRANT OF RIGHT BREAST IN FEMALE, ESTROGEN RECEPTOR POSITIVE (HCC): ICD-10-CM

## 2018-10-18 DIAGNOSIS — Z51.81 ENCOUNTER FOR MONITORING AROMATASE INHIBITOR THERAPY: ICD-10-CM

## 2018-10-18 DIAGNOSIS — Z79.811 ENCOUNTER FOR MONITORING AROMATASE INHIBITOR THERAPY: ICD-10-CM

## 2018-10-18 PROCEDURE — 77066 DX MAMMO INCL CAD BI: CPT | Performed by: INTERNAL MEDICINE

## 2018-10-18 PROCEDURE — 77062 BREAST TOMOSYNTHESIS BI: CPT | Performed by: INTERNAL MEDICINE

## 2018-10-22 ENCOUNTER — HOSPITAL ENCOUNTER (OUTPATIENT)
Dept: ULTRASOUND IMAGING | Age: 68
Discharge: HOME | End: 2018-10-22
Attending: INTERNAL MEDICINE
Payer: MEDICARE

## 2018-10-22 DIAGNOSIS — R74.8 ELEVATED LIVER ENZYMES: ICD-10-CM

## 2018-10-22 PROCEDURE — 76705 ECHO EXAM OF ABDOMEN: CPT | Performed by: INTERNAL MEDICINE

## 2018-10-24 ENCOUNTER — PRIOR ORIGINAL RECORDS (OUTPATIENT)
Dept: OTHER | Age: 68
End: 2018-10-24

## 2018-10-24 ENCOUNTER — APPOINTMENT (OUTPATIENT)
Dept: LAB | Facility: HOSPITAL | Age: 68
End: 2018-10-24
Attending: INTERNAL MEDICINE
Payer: MEDICARE

## 2018-10-24 DIAGNOSIS — R74.8 ELEVATED ALKALINE PHOSPHATASE MEASUREMENT: ICD-10-CM

## 2018-10-24 PROCEDURE — 80053 COMPREHEN METABOLIC PANEL: CPT

## 2018-10-24 PROCEDURE — 36415 COLL VENOUS BLD VENIPUNCTURE: CPT | Performed by: INTERNAL MEDICINE

## 2018-10-24 PROCEDURE — 82248 BILIRUBIN DIRECT: CPT | Performed by: INTERNAL MEDICINE

## 2018-10-25 LAB
ALBUMIN: 3.2 G/DL
ALKALINE PHOSPHATATE(ALK PHOS): 529 IU/L
BILIRUBIN TOTAL: 0.7 MG/DL
BUN: 13 MG/DL
CALCIUM: 8.8 MG/DL
CHLORIDE: 100 MEQ/L
CREATININE, SERUM: 0.68 MG/DL
GLOBULIN: 2.6 G/DL
GLUCOSE: 299 MG/DL
POTASSIUM, SERUM: 4 MEQ/L
PROTEIN, TOTAL: 5.8 G/DL
SGOT (AST): 73 IU/L
SGPT (ALT): 80 IU/L
SODIUM: 133 MEQ/L

## 2018-10-28 ENCOUNTER — PRIOR ORIGINAL RECORDS (OUTPATIENT)
Dept: OTHER | Age: 68
End: 2018-10-28

## 2018-10-28 ENCOUNTER — LAB ENCOUNTER (OUTPATIENT)
Dept: LAB | Facility: HOSPITAL | Age: 68
End: 2018-10-28
Attending: INTERNAL MEDICINE
Payer: MEDICARE

## 2018-10-28 DIAGNOSIS — R74.8 ELEVATED LIVER ENZYMES: Primary | ICD-10-CM

## 2018-10-28 PROCEDURE — 36415 COLL VENOUS BLD VENIPUNCTURE: CPT

## 2018-10-28 PROCEDURE — 86038 ANTINUCLEAR ANTIBODIES: CPT

## 2018-10-28 PROCEDURE — 80500 HEPATITIS A B + C PROFILE: CPT

## 2018-10-28 PROCEDURE — 86255 FLUORESCENT ANTIBODY SCREEN: CPT

## 2018-10-28 PROCEDURE — 86706 HEP B SURFACE ANTIBODY: CPT

## 2018-10-28 PROCEDURE — 86803 HEPATITIS C AB TEST: CPT

## 2018-10-28 PROCEDURE — 80053 COMPREHEN METABOLIC PANEL: CPT

## 2018-10-28 PROCEDURE — 87340 HEPATITIS B SURFACE AG IA: CPT

## 2018-10-28 PROCEDURE — 86708 HEPATITIS A ANTIBODY: CPT

## 2018-10-28 PROCEDURE — 86704 HEP B CORE ANTIBODY TOTAL: CPT

## 2018-10-29 LAB
ALBUMIN: 3.3 G/DL
ALKALINE PHOSPHATATE(ALK PHOS): 462 IU/L
BILIRUBIN TOTAL: 0.7 MG/DL
BUN: 13 MG/DL
CALCIUM: 8.9 MG/DL
CHLORIDE: 103 MEQ/L
CREATININE, SERUM: 0.69 MG/DL
GLOBULIN: 2.9 G/DL
GLUCOSE: 185 MG/DL
POTASSIUM, SERUM: 3.8 MEQ/L
PROTEIN, TOTAL: 6.2 G/DL
SGOT (AST): 53 IU/L
SGPT (ALT): 59 IU/L
SODIUM: 137 MEQ/L

## 2018-11-16 ENCOUNTER — OFFICE VISIT (OUTPATIENT)
Dept: PAIN CLINIC | Facility: HOSPITAL | Age: 68
End: 2018-11-16
Attending: ORTHOPAEDIC SURGERY
Payer: MEDICARE

## 2018-11-16 VITALS
HEIGHT: 62 IN | BODY MASS INDEX: 34.04 KG/M2 | DIASTOLIC BLOOD PRESSURE: 68 MMHG | HEART RATE: 71 BPM | WEIGHT: 185 LBS | SYSTOLIC BLOOD PRESSURE: 134 MMHG

## 2018-11-16 DIAGNOSIS — M48.062 SPINAL STENOSIS OF LUMBAR REGION WITH NEUROGENIC CLAUDICATION: ICD-10-CM

## 2018-11-16 DIAGNOSIS — M43.16 SPONDYLOLISTHESIS OF LUMBAR REGION: ICD-10-CM

## 2018-11-16 DIAGNOSIS — M54.32 SCIATICA OF LEFT SIDE: ICD-10-CM

## 2018-11-16 DIAGNOSIS — M70.62 TROCHANTERIC BURSITIS OF LEFT HIP: ICD-10-CM

## 2018-11-16 PROCEDURE — 99201 HC OUTPT EVAL AND MGNT NEW PT LEVEL 1: CPT

## 2018-11-16 RX ORDER — COVID-19 ANTIGEN TEST
220 KIT MISCELLANEOUS 2 TIMES DAILY PRN
COMMUNITY

## 2018-11-16 NOTE — CHRONIC PAIN
Initial Consultation Note      HISTORY OF PRESENT ILLNESS:  Florinda Carl is a 70year old old female referred to the pain clinic  for evaluation treatment of her low back pain which extends down to her left leg now has been having radicular pain down to Disp:  Rfl:    Omeprazole 40 MG Oral Capsule Delayed Release Take 40 mg by mouth daily.    Disp:  Rfl:    Calcipotriene 0.005 % Apply Externally Ointment  Disp:  Rfl: 5   Fluocinonide 0.05 % Apply Externally Ointment  Disp:  Rfl: 3        ALLERGIES:    Peni • Breast cancer, left ([facility] 2009    lumpectomy 01/16/2009   • Breast cancer, left [facility]) 2009    left breast re-excision, left axillary SLNB   • Breast tenderness in female 6/10/2018   • CAD (coronary artery disease) 06/21/2012   • Carotid artery disease tobacco: Never Used    Substance and Sexual Activity      Alcohol use:  Yes        Alcohol/week: 0.6 - 1.2 oz        Types: 1 - 2 Glasses of wine per week        Comment: weekly      Drug use: No      Sexual activity: Not on file    Other Topics      Concer Temperature:  normal to touch bilateral upper and lower extremities  Edema - Absent  *    Sensation (light touch/pinprick/temperature):     Right Lower Extremity:  Normal  Left Lower Extremity: Normal  Right Upper Extremity:  Normal  Left Upper Extremity TECHNIQUE:  Digital diagnostic mammography was performed and images were reviewed with the R2 ROZ [de-identified] CAD device. 3D tomosynthesis was performed and reviewed. BREAST COMPOSITION:  CATEGORY b - There are scattered areas of fibroglandular density.   FI stenosis left leg radiculopathy   improving with physical therapy and anti-inflammatory     PLAN:  RECOMMENDATIONS:  We gave Steph Denis the option of trying an epidural steroid injection and she wishes to hold off for the time being  If she changes her mind s

## 2018-11-16 NOTE — PROGRESS NOTES
INITIAL CONSULT:  11/16/18  PRESENTS AMBULATORY TO CPM;  NEW CONSULT C/O SCIATIC PAIN-LLE RADIATING DOWN, ALSO LT HIP PAIN;  RATES HER LEVEL 3/10;  PT STATES THIS BEGAN 1 1/2 MONTH AGO DENIES INJURY/TRAUMA;  PAIN INCREASES WHEN SHE LIES ON THE LT SIDE; AND

## 2018-11-30 ENCOUNTER — PRIOR ORIGINAL RECORDS (OUTPATIENT)
Dept: OTHER | Age: 68
End: 2018-11-30

## 2018-12-03 LAB
ALBUMIN: 3.4 G/DL
ALKALINE PHOSPHATATE(ALK PHOS): 293 IU/L
ALT (SGPT): 67 U/L
AST (SGOT): 43 U/L
BILIRUBIN TOTAL: 0.4 MG/DL
BUN: 13 MG/DL
CALCIUM: 8.9 MG/DL
CHLORIDE: 106 MEQ/L
CREATININE, SERUM: 0.78 MG/DL
GLOBULIN: 3 G/DL
GLUCOSE: 86 MG/DL
GLUCOSE: 86 MG/DL
HEMATOCRIT: 42.3 %
HEMOGLOBIN A1C: 8.7 %
HEMOGLOBIN: 13.4 G/DL
PLATELETS: 226 K/UL
POTASSIUM, SERUM: 4.2 MEQ/L
PROTEIN, TOTAL: 6.4 G/DL
RED BLOOD COUNT: 4.63 X 10-6/U
SGOT (AST): 43 IU/L
SGPT (ALT): 67 IU/L
SODIUM: 143 MEQ/L
WHITE BLOOD COUNT: 8.1 X 10-3/U

## 2018-12-04 ENCOUNTER — TELEPHONE (OUTPATIENT)
Dept: INTERNAL MEDICINE CLINIC | Facility: CLINIC | Age: 68
End: 2018-12-04

## 2018-12-05 NOTE — TELEPHONE ENCOUNTER
Pt called and liver enzymes are coming down slowly- had labs at Dr. Amy Talavera office.  Will have flu shot on 12/5/18

## 2018-12-20 ENCOUNTER — PRIOR ORIGINAL RECORDS (OUTPATIENT)
Dept: OTHER | Age: 68
End: 2018-12-20

## 2018-12-24 ENCOUNTER — NURSE ONLY (OUTPATIENT)
Dept: INTERNAL MEDICINE CLINIC | Facility: CLINIC | Age: 68
End: 2018-12-24
Payer: MEDICARE

## 2018-12-24 PROCEDURE — 90653 IIV ADJUVANT VACCINE IM: CPT | Performed by: INTERNAL MEDICINE

## 2018-12-24 PROCEDURE — G0008 ADMIN INFLUENZA VIRUS VAC: HCPCS | Performed by: INTERNAL MEDICINE

## 2018-12-24 NOTE — PROGRESS NOTES
Per Dr. Hooper Fought order, High dose flu vaccine was given in right arm. Patient tolerated vaccine well.

## 2019-01-02 ENCOUNTER — OFFICE VISIT (OUTPATIENT)
Dept: PAIN CLINIC | Facility: HOSPITAL | Age: 69
End: 2019-01-02
Attending: NURSE PRACTITIONER
Payer: MEDICARE

## 2019-01-02 PROCEDURE — 99211 OFF/OP EST MAY X REQ PHY/QHP: CPT

## 2019-01-02 RX ORDER — SIMVASTATIN 20 MG
TABLET ORAL
Refills: 5 | COMMUNITY
Start: 2018-12-20

## 2019-01-02 NOTE — PROGRESS NOTES
Patient presents to [facility] ambulatory for follow up. She has intermittent lower left back pain that radiates down her left leg. She only has the pain when sitting on a hard surface.   She started doing physical therapy but states they told her she is not imp

## 2019-01-02 NOTE — CHRONIC PAIN
Follow-up Note  CC: follow up  HISTORY OF PRESENT ILLNESS:  Nell Montgomery is a 70year old old female, originally referred to the pain clinic by Dr. Audie Dakin with history of  Left leg pain returns to the clinic for follow up.  Patient reports that she start LEVOTHROID) 137 MCG Oral Tab Take 125 mcg by mouth before breakfast.   Disp:  Rfl: 1   aspirin 81 MG Oral Chew Tab Chew  by mouth. Disp:  Rfl:    Benazepril-Hydrochlorothiazide (LOTENSIN HCT) 10-12.5 MG Oral Tab Take 1 tablet by mouth daily.    Disp:  Rfl: Breast cancer ([facility] 06/21/2012   • Breast cancer of upper-outer quadrant of left female breast ([facility] 6/21/2012   • Breast cancer, left ([facility] 2009    lumpectomy 01/16/2009   • Breast cancer, left ([facility] 2009    left breast re-excision, left axillary SLNB   •       Spouse name: Not on file      Number of children: Not on file      Years of education: Not on file      Highest education level: Not on file    Social Needs      Financial resource strain: Not on file      Food insecurity - worry: Not on file Extension 30 No   Left SB 30 No   Right SB 30 No   Left SB/E 30 No   Right SB/E 30 No     KNEES    Degree Pain   Right Flexion 120 No   Right Extension 0 No   Left Flexion 120 No   Left Extension 0 No     MOTOR EXAMINATION:  LOWER EXTREMITY      LEFT RIG reviewing data, obtaining patient information and discussing the care with the patients health care providers.

## 2019-01-03 ENCOUNTER — DOCUMENTATION ONLY (OUTPATIENT)
Dept: PAIN CLINIC | Facility: HOSPITAL | Age: 69
End: 2019-01-03

## 2019-01-04 ENCOUNTER — LAB ENCOUNTER (OUTPATIENT)
Dept: LAB | Facility: HOSPITAL | Age: 69
End: 2019-01-04
Attending: INTERNAL MEDICINE
Payer: MEDICARE

## 2019-01-04 ENCOUNTER — PRIOR ORIGINAL RECORDS (OUTPATIENT)
Dept: OTHER | Age: 69
End: 2019-01-04

## 2019-01-04 DIAGNOSIS — E03.9 HYPOTHYROIDISM: ICD-10-CM

## 2019-01-04 DIAGNOSIS — E10.9 DIABETES MELLITUS TYPE I (HCC): Primary | ICD-10-CM

## 2019-01-04 LAB
ALBUMIN SERPL BCP-MCNC: 3.2 G/DL (ref 3.5–4.8)
ALBUMIN/GLOB SERPL: 1.1 {RATIO} (ref 1–2)
ALP SERPL-CCNC: 168 U/L (ref 32–100)
ALT SERPL-CCNC: 32 U/L (ref 14–54)
ANION GAP SERPL CALC-SCNC: 8 MMOL/L (ref 0–18)
AST SERPL-CCNC: 29 U/L (ref 15–41)
BILIRUB SERPL-MCNC: 0.6 MG/DL (ref 0.3–1.2)
BUN SERPL-MCNC: 13 MG/DL (ref 8–20)
BUN/CREAT SERPL: 16.7 (ref 10–20)
CALCIUM SERPL-MCNC: 8.6 MG/DL (ref 8.5–10.5)
CHLORIDE SERPL-SCNC: 105 MMOL/L (ref 95–110)
CHOLEST SERPL-MCNC: 206 MG/DL (ref 110–200)
CO2 SERPL-SCNC: 26 MMOL/L (ref 22–32)
CREAT SERPL-MCNC: 0.78 MG/DL (ref 0.5–1.5)
CREAT UR-MCNC: 103.3 MG/DL
EST. AVERAGE GLUCOSE BLD GHB EST-MCNC: 192 MG/DL (ref 68–126)
GLOBULIN PLAS-MCNC: 2.8 G/DL (ref 2.5–3.7)
GLUCOSE SERPL-MCNC: 209 MG/DL (ref 70–99)
HBA1C MFR BLD HPLC: 8.3 % (ref ?–5.7)
HDLC SERPL-MCNC: 70 MG/DL
LDLC SERPL CALC-MCNC: 128 MG/DL (ref 0–99)
MICROALBUMIN UR-MCNC: 0.9 MG/DL (ref 0–1.8)
MICROALBUMIN/CREAT UR: 8.7 MG/G{CREAT} (ref 0–20)
NONHDLC SERPL-MCNC: 136 MG/DL
OSMOLALITY UR CALC.SUM OF ELEC: 294 MOSM/KG (ref 275–295)
PATIENT FASTING: YES
POTASSIUM SERPL-SCNC: 3.8 MMOL/L (ref 3.3–5.1)
PROT SERPL-MCNC: 6 G/DL (ref 5.9–8.4)
SODIUM SERPL-SCNC: 139 MMOL/L (ref 136–144)
T4 FREE SERPL-MCNC: 1.37 NG/DL (ref 0.58–1.64)
TRIGL SERPL-MCNC: 41 MG/DL (ref 1–149)
TSH SERPL-ACNC: 0.44 UIU/ML (ref 0.45–5.33)

## 2019-01-04 PROCEDURE — 82043 UR ALBUMIN QUANTITATIVE: CPT

## 2019-01-04 PROCEDURE — 80061 LIPID PANEL: CPT

## 2019-01-04 PROCEDURE — 83036 HEMOGLOBIN GLYCOSYLATED A1C: CPT

## 2019-01-04 PROCEDURE — 84439 ASSAY OF FREE THYROXINE: CPT

## 2019-01-04 PROCEDURE — 84443 ASSAY THYROID STIM HORMONE: CPT

## 2019-01-04 PROCEDURE — 36415 COLL VENOUS BLD VENIPUNCTURE: CPT

## 2019-01-04 PROCEDURE — 80053 COMPREHEN METABOLIC PANEL: CPT

## 2019-01-04 PROCEDURE — 82570 ASSAY OF URINE CREATININE: CPT

## 2019-01-07 LAB
ALBUMIN: 3.2 G/DL
ALT (SGPT): 32 U/L
AST (SGOT): 29 U/L
BILIRUBIN TOTAL: 0.6 MG/DL
BUN: 13 MG/DL
CHOLESTEROL, TOTAL: 206 MG/DL
CREATININE, SERUM: 0.78 MG/DL
GLOBULIN: 2.8 G/DL
GLUCOSE: 209 MG/DL
GLUCOSE: 209 MG/DL
HDL CHOLESTEROL: 70 MG/DL
LDL CHOLESTEROL: 128 MG/DL
POTASSIUM, SERUM: 3.8 MEQ/L
PROTEIN, TOTAL: 6 G/DL
SGOT (AST): 29 IU/L
SGPT (ALT): 32 IU/L
SODIUM: 139 MEQ/L
THYROID STIMULATING HORMONE: 0.44 MLU/L
TRIGLYCERIDES: 41 MG/DL

## 2019-01-09 ENCOUNTER — PRIOR ORIGINAL RECORDS (OUTPATIENT)
Dept: OTHER | Age: 69
End: 2019-01-09

## 2019-01-10 ENCOUNTER — DOCUMENTATION ONLY (OUTPATIENT)
Dept: PAIN CLINIC | Facility: HOSPITAL | Age: 69
End: 2019-01-10

## 2019-01-14 RX ORDER — ANASTROZOLE 1 MG/1
TABLET ORAL
Qty: 90 TABLET | Refills: 3 | Status: SHIPPED | OUTPATIENT
Start: 2019-01-14 | End: 2020-01-02

## 2019-01-21 ENCOUNTER — APPOINTMENT (OUTPATIENT)
Dept: HEMATOLOGY/ONCOLOGY | Facility: HOSPITAL | Age: 69
End: 2019-01-21
Attending: INTERNAL MEDICINE
Payer: COMMERCIAL

## 2019-01-28 ENCOUNTER — OFFICE VISIT (OUTPATIENT)
Dept: HEMATOLOGY/ONCOLOGY | Facility: HOSPITAL | Age: 69
End: 2019-01-28
Attending: INTERNAL MEDICINE
Payer: MEDICARE

## 2019-01-28 VITALS
SYSTOLIC BLOOD PRESSURE: 135 MMHG | TEMPERATURE: 97 F | BODY MASS INDEX: 32.89 KG/M2 | OXYGEN SATURATION: 97 % | DIASTOLIC BLOOD PRESSURE: 62 MMHG | HEIGHT: 62.25 IN | HEART RATE: 75 BPM | WEIGHT: 181 LBS | RESPIRATION RATE: 18 BRPM

## 2019-01-28 DIAGNOSIS — Z17.0 MALIGNANT NEOPLASM OF UPPER-OUTER QUADRANT OF LEFT BREAST IN FEMALE, ESTROGEN RECEPTOR POSITIVE (HCC): Primary | ICD-10-CM

## 2019-01-28 DIAGNOSIS — Z79.811 ENCOUNTER FOR MONITORING AROMATASE INHIBITOR THERAPY: ICD-10-CM

## 2019-01-28 DIAGNOSIS — C50.412 MALIGNANT NEOPLASM OF UPPER-OUTER QUADRANT OF LEFT BREAST IN FEMALE, ESTROGEN RECEPTOR POSITIVE (HCC): Primary | ICD-10-CM

## 2019-01-28 DIAGNOSIS — M85.88 OSTEOPENIA OF LUMBAR SPINE: ICD-10-CM

## 2019-01-28 DIAGNOSIS — Z51.81 ENCOUNTER FOR MONITORING AROMATASE INHIBITOR THERAPY: ICD-10-CM

## 2019-01-28 PROCEDURE — 99214 OFFICE O/P EST MOD 30 MIN: CPT | Performed by: INTERNAL MEDICINE

## 2019-01-29 ENCOUNTER — PATIENT OUTREACH (OUTPATIENT)
Dept: CASE MANAGEMENT | Age: 69
End: 2019-01-29

## 2019-01-31 ENCOUNTER — PATIENT OUTREACH (OUTPATIENT)
Dept: CASE MANAGEMENT | Age: 69
End: 2019-01-31

## 2019-02-01 ENCOUNTER — TELEPHONE (OUTPATIENT)
Dept: INTERNAL MEDICINE CLINIC | Facility: CLINIC | Age: 69
End: 2019-02-01

## 2019-02-01 NOTE — TELEPHONE ENCOUNTER
PT is showing symptoms of UTI. She has pain and frequent urination  She said she has a history of this getting bad fast.  She would like to see Dr. Darshan Dorman or have a Rx called in.

## 2019-02-06 ENCOUNTER — TELEPHONE (OUTPATIENT)
Dept: INTERNAL MEDICINE CLINIC | Facility: CLINIC | Age: 69
End: 2019-02-06

## 2019-02-06 NOTE — TELEPHONE ENCOUNTER
Pt called and states shes been on an antibiotic since last Friday and it doesn't seem to be helping her UTI. She'd like to take Dr Gera Hernandez same day on Friday at 10:30, is that possible?

## 2019-02-08 ENCOUNTER — OFFICE VISIT (OUTPATIENT)
Dept: INTERNAL MEDICINE CLINIC | Facility: CLINIC | Age: 69
End: 2019-02-08
Payer: MEDICARE

## 2019-02-08 VITALS
SYSTOLIC BLOOD PRESSURE: 106 MMHG | DIASTOLIC BLOOD PRESSURE: 64 MMHG | HEART RATE: 71 BPM | WEIGHT: 181 LBS | BODY MASS INDEX: 32.89 KG/M2 | OXYGEN SATURATION: 98 % | HEIGHT: 62.25 IN

## 2019-02-08 DIAGNOSIS — N34.2 INFECTIVE URETHRITIS: Primary | ICD-10-CM

## 2019-02-08 LAB
GLUCOSE (URINE DIPSTICK): 500 MG/DL
MULTISTIX LOT#: NORMAL NUMERIC
PH, URINE: 5.5 (ref 4.5–8)
SPECIFIC GRAVITY: 1.01 (ref 1–1.03)
URINE-COLOR: YELLOW
UROBILINOGEN,SEMI-QN: 0.2 MG/DL (ref 0–1.9)

## 2019-02-08 PROCEDURE — 81002 URINALYSIS NONAUTO W/O SCOPE: CPT | Performed by: INTERNAL MEDICINE

## 2019-02-08 PROCEDURE — 99213 OFFICE O/P EST LOW 20 MIN: CPT | Performed by: INTERNAL MEDICINE

## 2019-02-08 RX ORDER — NITROFURANTOIN 25; 75 MG/1; MG/1
100 CAPSULE ORAL 2 TIMES DAILY
Qty: 14 CAPSULE | Refills: 0 | Status: SHIPPED | OUTPATIENT
Start: 2019-02-08 | End: 2019-02-19

## 2019-02-08 RX ORDER — LEVOTHYROXINE SODIUM 0.12 MG/1
TABLET ORAL
Refills: 4 | COMMUNITY
Start: 2019-01-25

## 2019-02-08 NOTE — PROGRESS NOTES
HPI:    Patient ID: Eric Ruiz is a 70year old female. Pt here for evaluation of dysuria and persistent lower abd pains after a 7 day course of 250 mg bid . Has persistant frequency and slight lower abdominal discomfort.     Review of Systems   Gen reaction(s): Unknown  Clindamycin               Dust Mite Extract       OTHER (SEE COMMENTS)    Comment:Nasal congestion  Sulfa Antibiotics          PHYSICAL EXAM:   Physical Exam    Constitutional: She is obese. She appears well-developed.    Abdominal: Th

## 2019-02-11 NOTE — PROGRESS NOTES
JEFF   Chelsea Olson is a 70year old female with ER 87% NM 34% Ki-67 48% Her 2 margaux negative, infiltrating ductal  [id number] left breast cancer diagnosed in 2000-09-08 a lady sitting in front of my hand he.   Patient is seen today with complaints of increa HENT: Positive for dental problem. Negative for hearing loss. Patient is having bridges made instead of implants for missing teeth   Eyes: Positive for visual disturbance.         Contacts but uses reading glasses   Only peripheral vision in the rig Naproxen Sodium (ALEVE) 220 MG Oral Cap Take 220 mg by mouth 2 (two) times daily as needed.  Disp:  Rfl:    Albuterol Sulfate HFA (PROAIR HFA) 108 (90 Base) MCG/ACT Inhalation Aero Soln Inhale 2 puffs into the lungs every 4 (four) hours as needed for [employer] lumpectomy 01/16/2009   • Breast cancer, left [facility]) 2009    left breast re-excision, left axillary SLNB   • Breast tenderness in female 6/10/2018   • CAD (coronary artery disease) 06/21/2012   • Carotid artery disease ([facility] [address] 2010    LEFT heart cath adn PCI Transportation needs - non-medical: Not on file    Occupational History      Not on file    Tobacco Use      Smoking status: Never Smoker      Smokeless tobacco: Never Used    Substance and Sexual Activity      Alcohol use:  Yes        Alcohol/week: 0.6 Cardiovascular: Normal rate, regular rhythm and normal heart sounds. Pulmonary/Chest: Effort normal and breath sounds normal. No respiratory distress. She exhibits no tenderness. Breasts are asymmetrical.       Abdominal: Soft.  Bowel sounds are normal. S ALT (SGPT)      14 - 54 U/L 32 59 (H) 80 (H)   AST (SGOT)      15 - 41 U/L 29 53 (H) 73 (H)   ALKALINE PHOSPHATASE      32 - 100 U/L 168 (H) 462 (H) 529 (H)   Total Bilirubin      0.3 - 1.2 mg/dL 0.6 0.7 0.7   TOTAL PROTEIN      5.9 - 8.4 g/dL 6.0 6.2 5.8 ============================================  10/18/2018 2D 3D Anton mammogram  BREAST COMPOSITION:           CATEGORY b - There are scattered areas of fibroglandular density.    FINDINGS:        There has been no significant change in the appearance of the There is a stellate density in the left upper-outer quadrant consistent with post treatment scar. This contains coarse internal dystrophic calcifications. There is overlying breast deformity and anterior skin thickening.  Additional benign calc

## 2019-02-14 ENCOUNTER — PATIENT OUTREACH (OUTPATIENT)
Dept: CASE MANAGEMENT | Age: 69
End: 2019-02-14

## 2019-02-14 NOTE — PROGRESS NOTES
Called pt to f/u on paperwork mailed re: CCM program. Pt did receive the paperwork but wasn't sure who it was from. Pt asked me to resend the information and will think about it. I will send information again and follow up in a month.

## 2019-02-19 PROBLEM — M25.532 WRIST PAIN, ACUTE, LEFT: Status: ACTIVE | Noted: 2019-02-19

## 2019-02-25 ENCOUNTER — PATIENT OUTREACH (OUTPATIENT)
Dept: INTERNAL MEDICINE CLINIC | Facility: CLINIC | Age: 69
End: 2019-02-25

## 2019-02-25 PROBLEM — S60.212A CONTUSION OF LEFT WRIST: Status: ACTIVE | Noted: 2019-02-25

## 2019-02-25 NOTE — PROGRESS NOTES
Called patient to schedule diabetic follow up. She states she sees Dr. Lior Soliz to manage her diabetes every three months.  Last A1C was 11/30/18 and was 8.7

## 2019-02-28 VITALS
HEART RATE: 70 BPM | SYSTOLIC BLOOD PRESSURE: 110 MMHG | DIASTOLIC BLOOD PRESSURE: 60 MMHG | BODY MASS INDEX: 32.96 KG/M2 | WEIGHT: 186 LBS | HEIGHT: 63 IN | OXYGEN SATURATION: 95 %

## 2019-03-01 VITALS
OXYGEN SATURATION: 97 % | SYSTOLIC BLOOD PRESSURE: 110 MMHG | WEIGHT: 183 LBS | BODY MASS INDEX: 33.68 KG/M2 | HEART RATE: 69 BPM | DIASTOLIC BLOOD PRESSURE: 54 MMHG | HEIGHT: 62 IN

## 2019-03-01 VITALS
HEART RATE: 71 BPM | DIASTOLIC BLOOD PRESSURE: 58 MMHG | WEIGHT: 185 LBS | HEIGHT: 63 IN | OXYGEN SATURATION: 98 % | SYSTOLIC BLOOD PRESSURE: 110 MMHG | BODY MASS INDEX: 32.78 KG/M2

## 2019-03-12 RX ORDER — ANASTROZOLE 1 MG/1
1 TABLET ORAL DAILY
COMMUNITY

## 2019-03-12 RX ORDER — LEVOTHYROXINE SODIUM 0.12 MG/1
125 TABLET ORAL DAILY
COMMUNITY
End: 2023-06-27

## 2019-03-12 RX ORDER — OMEPRAZOLE 40 MG/1
40 CAPSULE, DELAYED RELEASE ORAL SEE ADMIN INSTRUCTIONS
COMMUNITY

## 2019-03-12 RX ORDER — INSULIN ASPART 100 [IU]/ML
INJECTION, SOLUTION INTRAVENOUS; SUBCUTANEOUS
COMMUNITY
End: 2022-11-30

## 2019-03-12 RX ORDER — METOPROLOL SUCCINATE 25 MG/1
TABLET, EXTENDED RELEASE ORAL
COMMUNITY
End: 2019-03-30 | Stop reason: SDUPTHER

## 2019-03-12 RX ORDER — SIMVASTATIN 20 MG
20 TABLET ORAL NIGHTLY
COMMUNITY
End: 2019-07-16 | Stop reason: SDUPTHER

## 2019-03-12 RX ORDER — METRONIDAZOLE 10 MG/G
GEL TOPICAL DAILY
COMMUNITY
End: 2020-04-03

## 2019-03-18 ENCOUNTER — PATIENT OUTREACH (OUTPATIENT)
Dept: CASE MANAGEMENT | Age: 69
End: 2019-03-18

## 2019-03-19 ENCOUNTER — HOSPITAL ENCOUNTER (OUTPATIENT)
Age: 69
Discharge: HOME | End: 2019-03-19
Attending: EMERGENCY MEDICINE
Payer: MEDICARE

## 2019-03-19 VITALS
WEIGHT: 180 LBS | OXYGEN SATURATION: 96 % | BODY MASS INDEX: 32.3 KG/M2 | HEART RATE: 77 BPM | SYSTOLIC BLOOD PRESSURE: 146 MMHG | DIASTOLIC BLOOD PRESSURE: 74 MMHG | HEIGHT: 62.5 IN | RESPIRATION RATE: 18 BRPM | TEMPERATURE: 97 F

## 2019-03-19 DIAGNOSIS — N30.00 ACUTE CYSTITIS WITHOUT HEMATURIA: Primary | ICD-10-CM

## 2019-03-19 LAB
BILIRUB UR QL STRIP: NEGATIVE
CLARITY UR: CLEAR
COLOR UR: YELLOW
GLUCOSE UR STRIP-MCNC: 500 MG/DL
KETONES UR STRIP-MCNC: NEGATIVE MG/DL
NITRITE UR QL STRIP: NEGATIVE
PH UR STRIP: 5 [PH]
PROT UR STRIP-MCNC: NEGATIVE MG/DL
SP GR UR STRIP: 1.01
UROBILINOGEN UR STRIP-ACNC: <2 MG/DL

## 2019-03-19 PROCEDURE — 87086 URINE CULTURE/COLONY COUNT: CPT | Performed by: EMERGENCY MEDICINE

## 2019-03-19 PROCEDURE — 87186 SC STD MICRODIL/AGAR DIL: CPT | Performed by: EMERGENCY MEDICINE

## 2019-03-19 PROCEDURE — 99214 OFFICE O/P EST MOD 30 MIN: CPT

## 2019-03-19 PROCEDURE — 87088 URINE BACTERIA CULTURE: CPT | Performed by: EMERGENCY MEDICINE

## 2019-03-19 PROCEDURE — 81003 URINALYSIS AUTO W/O SCOPE: CPT

## 2019-03-19 RX ORDER — PHENAZOPYRIDINE HYDROCHLORIDE 100 MG/1
100 TABLET, FILM COATED ORAL 3 TIMES DAILY PRN
Qty: 6 TABLET | Refills: 0 | Status: SHIPPED | OUTPATIENT
Start: 2019-03-19 | End: 2019-03-26

## 2019-03-19 RX ORDER — CIPROFLOXACIN 500 MG/1
500 TABLET, FILM COATED ORAL 2 TIMES DAILY
Qty: 6 TABLET | Refills: 0 | Status: SHIPPED | OUTPATIENT
Start: 2019-03-19 | End: 2019-03-22

## 2019-03-19 NOTE — ED PROVIDER NOTES
Patient Seen in: [facility] Immediate Care In [facility]    History   Patient presents with:  Urinary Symptoms (urologic)    Stated Complaint: uti    HPI    Patient complains of urinary frequency, urgency and dysuria that began 1 dasy ago.   [place] CORE BIOPSY, BREAST (DMG) Right 10/07/2009    RIGHT breast nodules; mammatome core needle biopsies; 2009   • RADIATION LEFT         Medications :   Ciprofloxacin HCl 500 MG Oral Tab,  Take 1 tablet (500 mg total) by mouth 2 (two) times daily for 3 days. other systems reviewed and negative except as noted above. PSFH elements reviewed from today and agreed except as otherwise stated in HPI.     Physical Exam     ED Triage Vitals [03/19/19 1523]   /74   Pulse 77   Resp 18   Temp 97.3 °F (36.3 °C) Clinical Impression:  Acute cystitis without hematuria  (primary encounter diagnosis)    Disposition:  Discharge    Follow-up:  Feliciano Raygoza MD  [address] [id number]    Schedule an appointment as soon as possible for a

## 2019-03-22 ENCOUNTER — TELEPHONE (OUTPATIENT)
Dept: INTERNAL MEDICINE CLINIC | Facility: CLINIC | Age: 69
End: 2019-03-22

## 2019-03-22 NOTE — TELEPHONE ENCOUNTER
Patient is ECOli +  and given Cipro. Two 1/2 days completed. Allergy: Penicillins, Sulfa, Clindamycin.

## 2019-03-22 NOTE — TELEPHONE ENCOUNTER
Pt called and states that she has a UTI and was seen at urgent care 3 days ago and was given medication, however it hasn't gotten any better. Pt was wondering if Dr Nikunj Mojica would call something in for her.

## 2019-03-26 ENCOUNTER — TELEPHONE (OUTPATIENT)
Dept: CARDIOLOGY | Age: 69
End: 2019-03-26

## 2019-03-26 PROBLEM — E10.51 DM (DIABETES MELLITUS), TYPE 1 WITH PERIPHERAL VASCULAR COMPLICATIONS (CMD): Status: ACTIVE | Noted: 2019-03-26

## 2019-03-26 PROBLEM — E78.00 HYPERCHOLESTEREMIA: Status: ACTIVE | Noted: 2019-03-26

## 2019-03-26 PROBLEM — E03.9 HYPOTHYROIDISM: Status: ACTIVE | Noted: 2019-03-26

## 2019-03-26 PROBLEM — I10 HYPERTENSION: Status: ACTIVE | Noted: 2019-03-26

## 2019-03-26 PROBLEM — I25.10 CAD (CORONARY ARTERY DISEASE): Status: ACTIVE | Noted: 2019-03-26

## 2019-04-01 RX ORDER — METOPROLOL SUCCINATE 25 MG/1
25 TABLET, EXTENDED RELEASE ORAL DAILY
Qty: 30 TABLET | Refills: 0 | Status: SHIPPED | OUTPATIENT
Start: 2019-04-01 | End: 2019-05-02 | Stop reason: SDUPTHER

## 2019-04-01 RX ORDER — METOPROLOL SUCCINATE 25 MG/1
TABLET, EXTENDED RELEASE ORAL
Qty: 30 TABLET | Refills: 0 | Status: SHIPPED | OUTPATIENT
Start: 2019-04-01 | End: 2019-04-01 | Stop reason: SDUPTHER

## 2019-04-08 ENCOUNTER — LAB ENCOUNTER (OUTPATIENT)
Dept: LAB | Facility: HOSPITAL | Age: 69
End: 2019-04-08
Attending: INTERNAL MEDICINE
Payer: MEDICARE

## 2019-04-08 DIAGNOSIS — E03.9 HYPOTHYROIDISM: ICD-10-CM

## 2019-04-08 DIAGNOSIS — E78.5 HYPERLIPIDEMIA: ICD-10-CM

## 2019-04-08 DIAGNOSIS — E11.9 DIABETES (HCC): ICD-10-CM

## 2019-04-08 DIAGNOSIS — Z00.00 ROUTINE GENERAL MEDICAL EXAMINATION AT A HEALTH CARE FACILITY: ICD-10-CM

## 2019-04-08 DIAGNOSIS — K59.00 CONSTIPATION: ICD-10-CM

## 2019-04-08 DIAGNOSIS — E10.9 DIABETES MELLITUS TYPE I (HCC): Primary | ICD-10-CM

## 2019-04-08 PROCEDURE — 80061 LIPID PANEL: CPT

## 2019-04-08 PROCEDURE — 82043 UR ALBUMIN QUANTITATIVE: CPT

## 2019-04-08 PROCEDURE — 84439 ASSAY OF FREE THYROXINE: CPT

## 2019-04-08 PROCEDURE — 83036 HEMOGLOBIN GLYCOSYLATED A1C: CPT

## 2019-04-08 PROCEDURE — 36415 COLL VENOUS BLD VENIPUNCTURE: CPT

## 2019-04-08 PROCEDURE — 80053 COMPREHEN METABOLIC PANEL: CPT

## 2019-04-08 PROCEDURE — 82570 ASSAY OF URINE CREATININE: CPT

## 2019-04-08 PROCEDURE — 85025 COMPLETE CBC W/AUTO DIFF WBC: CPT

## 2019-04-08 PROCEDURE — 84443 ASSAY THYROID STIM HORMONE: CPT

## 2019-04-15 ENCOUNTER — NURSE ONLY (OUTPATIENT)
Dept: INTERNAL MEDICINE CLINIC | Facility: CLINIC | Age: 69
End: 2019-04-15
Payer: MEDICARE

## 2019-04-15 ENCOUNTER — TELEPHONE (OUTPATIENT)
Dept: INTERNAL MEDICINE CLINIC | Facility: CLINIC | Age: 69
End: 2019-04-15

## 2019-04-15 DIAGNOSIS — N39.0 URINARY TRACT BACTERIAL INFECTIONS: ICD-10-CM

## 2019-04-15 DIAGNOSIS — R82.90 URINE ABNORMALITY: Primary | ICD-10-CM

## 2019-04-15 DIAGNOSIS — A49.9 URINARY TRACT BACTERIAL INFECTIONS: ICD-10-CM

## 2019-04-15 DIAGNOSIS — R82.90 URINE ABNORMALITY: ICD-10-CM

## 2019-04-15 PROBLEM — I25.10 ATHEROSCLEROSIS OF CORONARY ARTERY: Status: ACTIVE | Noted: 2019-03-26

## 2019-04-15 PROCEDURE — 87088 URINE BACTERIA CULTURE: CPT | Performed by: INTERNAL MEDICINE

## 2019-04-15 PROCEDURE — 87186 SC STD MICRODIL/AGAR DIL: CPT | Performed by: INTERNAL MEDICINE

## 2019-04-15 PROCEDURE — 87086 URINE CULTURE/COLONY COUNT: CPT | Performed by: INTERNAL MEDICINE

## 2019-05-03 RX ORDER — METOPROLOL SUCCINATE 25 MG/1
TABLET, EXTENDED RELEASE ORAL
Qty: 30 TABLET | Refills: 0 | Status: SHIPPED | OUTPATIENT
Start: 2019-05-03 | End: 2020-06-29

## 2019-05-10 RX ORDER — METOPROLOL SUCCINATE 25 MG/1
TABLET, EXTENDED RELEASE ORAL
Qty: 30 TABLET | Refills: 0 | Status: SHIPPED | OUTPATIENT
Start: 2019-05-10 | End: 2019-05-24 | Stop reason: SDUPTHER

## 2019-05-23 RX ORDER — COVID-19 ANTIGEN TEST
220 KIT MISCELLANEOUS
COMMUNITY

## 2019-05-23 RX ORDER — CALCIPOTRIENE 50 UG/G
OINTMENT TOPICAL
COMMUNITY
Start: 2014-09-04

## 2019-05-23 RX ORDER — NITROFURANTOIN 25; 75 MG/1; MG/1
100 CAPSULE ORAL
COMMUNITY
Start: 2019-03-22 | End: 2019-09-26

## 2019-05-23 RX ORDER — LETROZOLE 2.5 MG/1
TABLET, FILM COATED ORAL
COMMUNITY
Start: 2010-08-18 | End: 2019-05-24

## 2019-05-23 RX ORDER — PANTOPRAZOLE SODIUM 40 MG/1
TABLET, DELAYED RELEASE ORAL
COMMUNITY
End: 2019-09-26

## 2019-05-23 RX ORDER — EZETIMIBE 10 MG/1
10 TABLET ORAL
COMMUNITY
End: 2019-05-24

## 2019-05-23 RX ORDER — FLUOCINONIDE 0.5 MG/G
OINTMENT TOPICAL
COMMUNITY
Start: 2014-08-31

## 2019-05-23 RX ORDER — ATENOLOL 25 MG/1
25 TABLET ORAL
COMMUNITY
End: 2020-03-23 | Stop reason: ALTCHOICE

## 2019-05-23 RX ORDER — TRIAMCINOLONE ACETONIDE 1 MG/G
OINTMENT TOPICAL
COMMUNITY
Start: 2017-10-16

## 2019-05-23 RX ORDER — ALBUTEROL SULFATE 90 UG/1
2 AEROSOL, METERED RESPIRATORY (INHALATION) PRN
COMMUNITY
Start: 2018-01-23

## 2019-05-23 RX ORDER — FUROSEMIDE 20 MG/1
TABLET ORAL
COMMUNITY
Start: 2013-08-05 | End: 2019-05-24

## 2019-05-23 RX ORDER — CLOBETASOL PROPIONATE 0.46 MG/ML
SOLUTION TOPICAL
COMMUNITY
Start: 2017-10-16

## 2019-05-24 ENCOUNTER — OFFICE VISIT (OUTPATIENT)
Dept: FAMILY MEDICINE CLINIC | Facility: CLINIC | Age: 69
End: 2019-05-24
Payer: MEDICARE

## 2019-05-24 ENCOUNTER — OFFICE VISIT (OUTPATIENT)
Dept: CARDIOLOGY | Age: 69
End: 2019-05-24

## 2019-05-24 VITALS
DIASTOLIC BLOOD PRESSURE: 66 MMHG | WEIGHT: 183 LBS | OXYGEN SATURATION: 98 % | SYSTOLIC BLOOD PRESSURE: 128 MMHG | HEIGHT: 62 IN | BODY MASS INDEX: 33.68 KG/M2 | HEART RATE: 75 BPM

## 2019-05-24 VITALS
HEART RATE: 70 BPM | BODY MASS INDEX: 33 KG/M2 | DIASTOLIC BLOOD PRESSURE: 66 MMHG | WEIGHT: 182 LBS | OXYGEN SATURATION: 96 % | SYSTOLIC BLOOD PRESSURE: 139 MMHG | RESPIRATION RATE: 16 BRPM | TEMPERATURE: 98 F

## 2019-05-24 DIAGNOSIS — R09.82 POST-NASAL DRAINAGE: ICD-10-CM

## 2019-05-24 DIAGNOSIS — I25.10 CORONARY ARTERY DISEASE INVOLVING NATIVE CORONARY ARTERY OF NATIVE HEART WITHOUT ANGINA PECTORIS: Primary | ICD-10-CM

## 2019-05-24 DIAGNOSIS — E78.00 HYPERCHOLESTEREMIA: ICD-10-CM

## 2019-05-24 DIAGNOSIS — B96.89 ACUTE BACTERIAL RHINOSINUSITIS: Primary | ICD-10-CM

## 2019-05-24 DIAGNOSIS — J01.90 ACUTE BACTERIAL RHINOSINUSITIS: Primary | ICD-10-CM

## 2019-05-24 PROCEDURE — 99214 OFFICE O/P EST MOD 30 MIN: CPT | Performed by: INTERNAL MEDICINE

## 2019-05-24 PROCEDURE — 99202 OFFICE O/P NEW SF 15 MIN: CPT | Performed by: PHYSICIAN ASSISTANT

## 2019-05-24 RX ORDER — AZITHROMYCIN 250 MG/1
TABLET, FILM COATED ORAL
Qty: 6 TABLET | Refills: 0 | Status: SHIPPED | OUTPATIENT
Start: 2019-05-24 | End: 2019-06-28 | Stop reason: ALTCHOICE

## 2019-05-24 RX ORDER — EZETIMIBE 10 MG/1
10 TABLET ORAL DAILY
Qty: 90 TABLET | Refills: 3 | Status: SHIPPED | OUTPATIENT
Start: 2019-05-24 | End: 2020-03-01 | Stop reason: SDUPTHER

## 2019-05-24 ASSESSMENT — PATIENT HEALTH QUESTIONNAIRE - PHQ9
SUM OF ALL RESPONSES TO PHQ9 QUESTIONS 1 AND 2: 0
1. LITTLE INTEREST OR PLEASURE IN DOING THINGS: NOT AT ALL
SUM OF ALL RESPONSES TO PHQ9 QUESTIONS 1 AND 2: 0
2. FEELING DOWN, DEPRESSED OR HOPELESS: NOT AT ALL

## 2019-05-24 NOTE — PROGRESS NOTES
CHIEF COMPLAINT:   Patient presents with:  Sinus Problem: over a month, side congestion and thick drainage mainly on right side, states fever intermittenltly, last a couple days ago      HPI:   Neil Valdovinos is a 78year old female who presents for cold [address]:  Rfl: 4   Clobetasol Propionate 0.05 % External Solution  Disp:  Rfl: 2   SANJU CONTOUR NEXT TEST In Vitro Strip  Disp:  Rfl: 2   NOVOLOG 100 UNIT/ML Subcutaneous Solution  Disp:  Rfl: 4   aspirin 81 MG Oral Chew Tab Chew  by mouth.  Disp:  Rfl Malignant neoplasm of upper-outer quadrant of left breast in female, estrogen receptor positive ([address] 6/21/2012   • Osteopenia of lumbar spine 6/10/2018   • Psoriasis 06/21/2012   • Thrombus 2009    SVC thrombus;  Thrombolysis 05/29/2009   • Trigger finger bony landmarks intact  NOSE: nostrils patent, turbinates with mild swelling, yellow nasal mucous, nasal mucosa pink and moist  THROAT: oral mucosa pink and moist. No visible dental caries.  Posterior pharynx with no erythema, no exudates, tonsils 1/4, patie

## 2019-06-13 RX ORDER — METOPROLOL SUCCINATE 25 MG/1
TABLET, EXTENDED RELEASE ORAL
Qty: 30 TABLET | Refills: 6 | Status: SHIPPED | OUTPATIENT
Start: 2019-06-13 | End: 2020-02-02 | Stop reason: SDUPTHER

## 2019-06-28 ENCOUNTER — OFFICE VISIT (OUTPATIENT)
Dept: INTERNAL MEDICINE CLINIC | Facility: CLINIC | Age: 69
End: 2019-06-28
Payer: MEDICARE

## 2019-06-28 VITALS
OXYGEN SATURATION: 98 % | HEIGHT: 62.25 IN | DIASTOLIC BLOOD PRESSURE: 82 MMHG | SYSTOLIC BLOOD PRESSURE: 128 MMHG | BODY MASS INDEX: 33.36 KG/M2 | WEIGHT: 183.63 LBS | HEART RATE: 75 BPM

## 2019-06-28 DIAGNOSIS — E10.36 TYPE 1 DIABETES MELLITUS WITH DIABETIC CATARACT (HCC): ICD-10-CM

## 2019-06-28 DIAGNOSIS — E03.9 ACQUIRED HYPOTHYROIDISM: ICD-10-CM

## 2019-06-28 DIAGNOSIS — Z85.3 HX OF BREAST CANCER: ICD-10-CM

## 2019-06-28 DIAGNOSIS — Z00.00 MEDICARE ANNUAL WELLNESS VISIT, SUBSEQUENT: Primary | ICD-10-CM

## 2019-06-28 DIAGNOSIS — R09.82 POST-NASAL DRIP: ICD-10-CM

## 2019-06-28 DIAGNOSIS — E78.2 MIXED HYPERLIPIDEMIA: ICD-10-CM

## 2019-06-28 PROCEDURE — G0439 PPPS, SUBSEQ VISIT: HCPCS | Performed by: INTERNAL MEDICINE

## 2019-06-28 RX ORDER — EZETIMIBE 10 MG/1
10 TABLET ORAL
COMMUNITY
Start: 2019-05-24

## 2019-06-28 RX ORDER — FLUTICASONE PROPIONATE 50 MCG
2 SPRAY, SUSPENSION (ML) NASAL DAILY
Qty: 1 BOTTLE | Refills: 3 | Status: SHIPPED | OUTPATIENT
Start: 2019-06-28 | End: 2020-06-22

## 2019-06-28 NOTE — PROGRESS NOTES
HPI:    Patient ID: Eric Ruiz is a 78year old female. Pt here for an AWV and fu on Dm type 1 , Cad and hypothyroidism, hx of breast cancer  And hyperlipidemia. Has recently had some right sided sinus pressure and congestion.  Is due for labs in n help    Toileting: Able without help    Dressing: Able without help    Eating: Able without help    Driving: Able without help    Preparing your meals: Able without help    Managing money/bills: Able without help    Taking medications as prescribed: Able w RESERVOIR) Does not apply Misc as directed Disp:  Rfl:    metroNIDAZOLE (METROGEL) 1 % External Gel daily Disp:  Rfl:    Levothyroxine Sodium 125 MCG Oral Tab  Disp:  Rfl: 4   ANASTROZOLE 1 MG Oral Tab tab TAKE ONE TABLET BY MOUTH ONE TIME DAILY  Disp: 90 Diabetes mellitus ([facility] 06/21/2012   • Encounter for fitting of portacath 2009    venous access; portacath placement, 02/05/2009   • Encounter for imaging to assess osteopenia 7/21/2016   • Encounter for monitoring aromatase inhibitor therapy 6/10/2018   • of breath with exertion  CARDIOVASCULAR: denies chest pain on exertion  GI: denies abdominal pain, denies heartburn  : denies dysuria, vaginal discharge or itching, no complaint of urinary incontinence   MUSCULOSKELETAL: occ.  back pain  NEURO: denies hea ([facility]    Mixed hyperlipidemia    Acquired hypothyroidism    Hx of breast cancer    Post-nasal drip    Other orders  -     OCCULT BLOOD, FECAL, IMMUNOASSAY;  Future  -     GASTRO - INTERNAL  -     PNEUMOCOCCAL IMM (PNEUMOVAX)  -     OPHTHALMOLOGY - INTERNAL Immunization Activity if applicable    Pneumococcal Orders placed or performed in visit on 03/26/18   • PNEUMOCOCCAL VACC, 13 MIGNON IM    Update Immunization Activity if applicable    Hepatitis B No orders found for this or any previous visit.  Update Immuniz patient. Pap All Patients q2 year if indicated There are no preventive care reminders to display for this patient.    Mammogram Age > 44 q1 year Mammogram due on 10/18/2019   EKG All Patients One at initial exam    Vaccines:      Pneumococcal All Patients SANJU CONTOUR NEXT TEST In Vitro Strip  Disp:  Rfl: 2   NOVOLOG 100 UNIT/ML Subcutaneous Solution  Disp:  Rfl: 4   aspirin 81 MG Oral Chew Tab Chew  by mouth.  Disp:  Rfl:    Benazepril-Hydrochlorothiazide (LOTENSIN HCT) 10-12.5 MG Oral Tab Take 1 tablet

## 2019-07-02 ENCOUNTER — LAB ENCOUNTER (OUTPATIENT)
Dept: LAB | Facility: HOSPITAL | Age: 69
End: 2019-07-02
Attending: INTERNAL MEDICINE
Payer: MEDICARE

## 2019-07-02 DIAGNOSIS — E03.9 HYPOTHYROIDISM: ICD-10-CM

## 2019-07-02 DIAGNOSIS — E10.9 TYPE 1 DIABETES (HCC): Primary | ICD-10-CM

## 2019-07-02 LAB
ALBUMIN SERPL-MCNC: 3.2 G/DL
ALBUMIN SERPL-MCNC: 3.2 G/DL (ref 3.4–5)
ALBUMIN/GLOB SERPL: 1 {RATIO} (ref 1–2)
ALBUMIN/GLOB SERPL: NORMAL {RATIO}
ALP LIVER SERPL-CCNC: 155 U/L (ref 55–142)
ALP SERPL-CCNC: 155 U/L
ALT SERPL-CCNC: 31 U/L
ALT SERPL-CCNC: 31 U/L (ref 13–56)
ANION GAP SERPL CALC-SCNC: 5 MMOL/L (ref 0–18)
ANION GAP SERPL CALC-SCNC: NORMAL MMOL/L
AST SERPL-CCNC: 19 U/L
AST SERPL-CCNC: 19 U/L (ref 15–37)
BILIRUB SERPL-MCNC: 0.5 MG/DL
BILIRUB SERPL-MCNC: 0.5 MG/DL (ref 0.1–2)
BUN BLD-MCNC: 19 MG/DL (ref 7–18)
BUN SERPL-MCNC: 19 MG/DL
BUN/CREAT SERPL: 25 (ref 10–20)
BUN/CREAT SERPL: NORMAL
CALCIUM BLD-MCNC: 8.8 MG/DL (ref 8.5–10.1)
CALCIUM SERPL-MCNC: 8.8 MG/DL
CHLORIDE SERPL-SCNC: 105 MMOL/L
CHLORIDE SERPL-SCNC: 105 MMOL/L (ref 98–112)
CHOLEST SERPL-MCNC: 140 MG/DL
CHOLEST SMN-MCNC: 140 MG/DL (ref ?–200)
CHOLEST/HDLC SERPL: NORMAL {RATIO}
CO2 SERPL-SCNC: 31 MMOL/L (ref 21–32)
CO2 SERPL-SCNC: NORMAL MMOL/L
CREAT BLD-MCNC: 0.76 MG/DL (ref 0.55–1.02)
CREAT SERPL-MCNC: 0.76 MG/DL
CREAT UR-SCNC: 116 MG/DL
EST. AVERAGE GLUCOSE BLD GHB EST-MCNC: 197 MG/DL (ref 68–126)
EST. AVERAGE GLUCOSE BLD GHB EST-MCNC: ABNORMAL MG/DL
GLOBULIN PLAS-MCNC: 3.3 G/DL (ref 2.8–4.4)
GLOBULIN SER-MCNC: 3.3 G/DL
GLUCOSE BLD-MCNC: 325 MG/DL (ref 70–99)
GLUCOSE SERPL-MCNC: 325 MG/DL
HBA1C MFR BLD HPLC: 8.5 % (ref ?–5.7)
HBA1C MFR BLD: 8.5 % (ref 4.5–5.6)
HBA1C MFR BLD: ABNORMAL %
HDLC SERPL-MCNC: 66 MG/DL
HDLC SERPL-MCNC: 66 MG/DL (ref 40–59)
LDLC SERPL CALC-MCNC: 62 MG/DL
LDLC SERPL CALC-MCNC: 62 MG/DL (ref ?–100)
LENGTH OF FAST TIME PATIENT: NORMAL H
LENGTH OF FAST TIME PATIENT: NORMAL H
M PROTEIN MFR SERPL ELPH: 6.5 G/DL (ref 6.4–8.2)
MICROALBUMIN UR-MCNC: 2.05 MG/DL
MICROALBUMIN/CREAT 24H UR-RTO: 17.7 UG/MG (ref ?–30)
NONHDLC SERPL-MCNC: 74 MG/DL (ref ?–130)
NONHDLC SERPL-MCNC: NORMAL MG/DL
OSMOLALITY SERPL CALC.SUM OF ELEC: 307 MOSM/KG (ref 275–295)
PATIENT FASTING: YES
PATIENT FASTING: YES
POTASSIUM SERPL-SCNC: 4.3 MMOL/L
POTASSIUM SERPL-SCNC: 4.3 MMOL/L (ref 3.5–5.1)
PROT SERPL-MCNC: 6.5 G/DL
SODIUM SERPL-SCNC: 141 MMOL/L
SODIUM SERPL-SCNC: 141 MMOL/L (ref 136–145)
T4 FREE SERPL-MCNC: 1.5 NG/DL
T4 FREE SERPL-MCNC: 1.5 NG/DL (ref 0.8–1.7)
TRIGL SERPL-MCNC: 58 MG/DL
TRIGL SERPL-MCNC: 58 MG/DL (ref 30–149)
TSH SERPL-ACNC: 0.52 M[IU]/L
TSI SER-ACNC: 0.52 MIU/ML (ref 0.36–3.74)
VLDLC SERPL CALC-MCNC: 12 MG/DL (ref 0–30)
VLDLC SERPL CALC-MCNC: NORMAL MG/DL

## 2019-07-02 PROCEDURE — 80061 LIPID PANEL: CPT

## 2019-07-02 PROCEDURE — 84443 ASSAY THYROID STIM HORMONE: CPT

## 2019-07-02 PROCEDURE — 84439 ASSAY OF FREE THYROXINE: CPT

## 2019-07-02 PROCEDURE — 82043 UR ALBUMIN QUANTITATIVE: CPT

## 2019-07-02 PROCEDURE — 36415 COLL VENOUS BLD VENIPUNCTURE: CPT

## 2019-07-02 PROCEDURE — 82570 ASSAY OF URINE CREATININE: CPT

## 2019-07-02 PROCEDURE — 83036 HEMOGLOBIN GLYCOSYLATED A1C: CPT

## 2019-07-02 PROCEDURE — 80053 COMPREHEN METABOLIC PANEL: CPT

## 2019-07-03 ENCOUNTER — CLINICAL ABSTRACT (OUTPATIENT)
Dept: CARDIOLOGY | Age: 69
End: 2019-07-03

## 2019-07-08 ENCOUNTER — TELEPHONE (OUTPATIENT)
Dept: CARDIOLOGY | Age: 69
End: 2019-07-08

## 2019-07-08 ENCOUNTER — HOSPITAL ENCOUNTER (OUTPATIENT)
Dept: BONE DENSITY | Age: 69
Discharge: HOME | End: 2019-07-08
Attending: INTERNAL MEDICINE
Payer: MEDICARE

## 2019-07-08 DIAGNOSIS — Z79.811 ENCOUNTER FOR MONITORING AROMATASE INHIBITOR THERAPY: ICD-10-CM

## 2019-07-08 DIAGNOSIS — Z51.81 ENCOUNTER FOR MONITORING AROMATASE INHIBITOR THERAPY: ICD-10-CM

## 2019-07-16 RX ORDER — SIMVASTATIN 20 MG
20 TABLET ORAL NIGHTLY
Qty: 90 TABLET | Refills: 3 | Status: SHIPPED | OUTPATIENT
Start: 2019-07-16 | End: 2020-06-29

## 2019-07-19 ENCOUNTER — HOSPITAL ENCOUNTER (OUTPATIENT)
Dept: BONE DENSITY | Age: 69
Discharge: HOME | End: 2019-07-19
Attending: INTERNAL MEDICINE
Payer: MEDICARE

## 2019-07-19 PROCEDURE — 77080 DXA BONE DENSITY AXIAL: CPT | Performed by: INTERNAL MEDICINE

## 2019-07-29 ENCOUNTER — OFFICE VISIT (OUTPATIENT)
Dept: HEMATOLOGY/ONCOLOGY | Facility: HOSPITAL | Age: 69
End: 2019-07-29
Attending: INTERNAL MEDICINE
Payer: MEDICARE

## 2019-07-29 VITALS
HEART RATE: 73 BPM | OXYGEN SATURATION: 97 % | SYSTOLIC BLOOD PRESSURE: 132 MMHG | BODY MASS INDEX: 33.8 KG/M2 | HEIGHT: 62.25 IN | WEIGHT: 186 LBS | RESPIRATION RATE: 16 BRPM | DIASTOLIC BLOOD PRESSURE: 46 MMHG | TEMPERATURE: 98 F

## 2019-07-29 DIAGNOSIS — Z79.811 ENCOUNTER FOR MONITORING AROMATASE INHIBITOR THERAPY: ICD-10-CM

## 2019-07-29 DIAGNOSIS — C50.412 MALIGNANT NEOPLASM OF UPPER-OUTER QUADRANT OF LEFT BREAST IN FEMALE, ESTROGEN RECEPTOR POSITIVE (HCC): Primary | ICD-10-CM

## 2019-07-29 DIAGNOSIS — Z51.81 ENCOUNTER FOR MONITORING AROMATASE INHIBITOR THERAPY: ICD-10-CM

## 2019-07-29 DIAGNOSIS — M85.80 OSTEOPENIA DETERMINED BY X-RAY: ICD-10-CM

## 2019-07-29 DIAGNOSIS — Z12.31 ENCOUNTER FOR SCREENING MAMMOGRAM FOR MALIGNANT NEOPLASM OF BREAST: ICD-10-CM

## 2019-07-29 DIAGNOSIS — Z17.0 MALIGNANT NEOPLASM OF UPPER-OUTER QUADRANT OF LEFT BREAST IN FEMALE, ESTROGEN RECEPTOR POSITIVE (HCC): Primary | ICD-10-CM

## 2019-07-29 PROCEDURE — 99214 OFFICE O/P EST MOD 30 MIN: CPT | Performed by: INTERNAL MEDICINE

## 2019-07-29 NOTE — PROGRESS NOTES
JEFF Tineo is a 78year old female with ER 87% CA 34% Ki-67 48% Her 2 margaux negative, infiltrating ductal  [id number] left breast cancer diagnosed in 2009. Patient is seen today without complaints of left breast discomfort.    She is continuing adjuv Cardiovascular: Positive for leg swelling. Negative for chest pain and palpitations.         Hyperlipidemia treated by Dr. Alva Vidse now with simvastatin and Zetia secondary to previous LFT elevations     Gastrointestinal: Negative for abdominal pain, constipa Naproxen Sodium (ALEVE) 220 MG Oral Cap Take 220 mg by mouth 2 (two) times daily as needed.  Disp:  Rfl:    Albuterol Sulfate HFA (PROAIR HFA) 108 (90 Base) MCG/ACT Inhalation Aero Soln Inhale 2 puffs into the lungs every 4 (four) hours as needed for [employer] • Encounter for imaging to assess osteopenia 2016   • Encounter for monitoring aromatase inhibitor therapy 6/10/2018   • GERD (gastroesophageal reflux disease) 2012   • H/O  section     x2   • H/O vitrectomy     LEFT vitrectomy   • Hip b Days per week: Not on file        Minutes per session: Not on file      Stress: Not on file    Relationships      Social connections:        Talks on phone: Not on file        Gets together: Not on file        Attends Gnosticist service: Not on file Head: Normocephalic and atraumatic. Mouth/Throat: No oropharyngeal exudate. Eyes: Pupils are equal, round, and reactive to light. Conjunctivae and EOM are normal. No scleral icterus. Neck: Normal range of motion. Neck supple.    Cardiovascular: Normal The aromatase inhibitor therapy places the patient at increased risk for loss of bone density. She has had fractures involving the foot. Patient's bone density was repeated 7/19/2019 and revealed osteopenia with increased fracture risk.   The 10-year frac 0.55 - 1.02 mg/dL  0.76 0.73   BUN/CREAT Ratio      10.0 - 20.0  25.0 (H) 17.8   CALCIUM      8.5 - 10.1 mg/dL  8.8 8.5   CALCULATED OSMOLALITY      275 - 295 mOsm/kg  307 (H) 295   GFR, Non-      >=60  80 84   GFR, -American ============================================  10/18/2018 2D 3D Anton mammogram  BREAST COMPOSITION:           CATEGORY b - There are scattered areas of fibroglandular density.    FINDINGS:        There has been no significant change in the appearance of the There is a stellate density in the left upper-outer quadrant consistent with post treatment scar. This contains coarse internal dystrophic calcifications. There is overlying breast deformity and anterior skin thickening.  Additional benign calc

## 2019-07-30 PROBLEM — M85.80 OSTEOPENIA DETERMINED BY X-RAY: Status: ACTIVE | Noted: 2019-07-30

## 2019-07-30 PROBLEM — Z12.31 ENCOUNTER FOR SCREENING MAMMOGRAM FOR MALIGNANT NEOPLASM OF BREAST: Status: ACTIVE | Noted: 2019-07-30

## 2019-08-05 ENCOUNTER — TELEPHONE (OUTPATIENT)
Dept: INTERNAL MEDICINE CLINIC | Facility: CLINIC | Age: 69
End: 2019-08-05

## 2019-08-05 NOTE — TELEPHONE ENCOUNTER
Pt needs catarack surgery clearance from Dr Nidia Hopson. Pt was in for her AWV on June 28th, does she needs to be seen again for the clearance? Please advise. Pt is going to schedule an appointment and if she doesn't need to come in will cancel.

## 2019-08-08 NOTE — TELEPHONE ENCOUNTER
Pt called the office today to verify that we received the form for surgery clearance from [address] [place] [address] [facility]. Checked in MD's bin & it appears that we did receive it. Pt notified, and is requesting a return call when form is completed.  Also needs to know w

## 2019-08-21 ENCOUNTER — NURSE ONLY (OUTPATIENT)
Dept: FAMILY MEDICINE CLINIC | Facility: CLINIC | Age: 69
End: 2019-08-21
Payer: MEDICARE

## 2019-08-21 VITALS
WEIGHT: 185.38 LBS | OXYGEN SATURATION: 96 % | BODY MASS INDEX: 33.26 KG/M2 | DIASTOLIC BLOOD PRESSURE: 75 MMHG | SYSTOLIC BLOOD PRESSURE: 106 MMHG | TEMPERATURE: 98 F | HEIGHT: 62.5 IN | RESPIRATION RATE: 16 BRPM | HEART RATE: 72 BPM

## 2019-08-21 DIAGNOSIS — R81 GLUCOSE FOUND IN URINE ON EXAMINATION: ICD-10-CM

## 2019-08-21 DIAGNOSIS — R30.0 DYSURIA: Primary | ICD-10-CM

## 2019-08-21 LAB
APPEARANCE: CLEAR
GLUCOSE (URINE DIPSTICK): 500 MG/DL
MULTISTIX LOT#: NORMAL NUMERIC
PH, URINE: 5 (ref 4.5–8)
POC GLUCOSE: 239
SPECIFIC GRAVITY: 1.01 (ref 1–1.03)
URINE-COLOR: YELLOW
UROBILINOGEN,SEMI-QN: 0.2 MG/DL (ref 0–1.9)

## 2019-08-21 PROCEDURE — 87077 CULTURE AEROBIC IDENTIFY: CPT | Performed by: NURSE PRACTITIONER

## 2019-08-21 PROCEDURE — 87186 SC STD MICRODIL/AGAR DIL: CPT | Performed by: NURSE PRACTITIONER

## 2019-08-21 PROCEDURE — 81003 URINALYSIS AUTO W/O SCOPE: CPT | Performed by: NURSE PRACTITIONER

## 2019-08-21 PROCEDURE — 99213 OFFICE O/P EST LOW 20 MIN: CPT | Performed by: NURSE PRACTITIONER

## 2019-08-21 PROCEDURE — 87086 URINE CULTURE/COLONY COUNT: CPT | Performed by: NURSE PRACTITIONER

## 2019-08-21 RX ORDER — NITROFURANTOIN 25; 75 MG/1; MG/1
100 CAPSULE ORAL 2 TIMES DAILY
Qty: 14 CAPSULE | Refills: 0 | Status: SHIPPED | OUTPATIENT
Start: 2019-08-21 | End: 2019-08-28

## 2019-08-21 NOTE — PROGRESS NOTES
CHIEF COMPLAINT:   Patient presents with:  Urinary Frequency: urnien frequency and pain x4-5d      HPI:   Stanislav Brennan is a 78year old female who presents with symptoms of UTI. Complaining of urinary frequency, urgency, dysuria for last 4-5 days. Clobetasol Propionate 0.05 % External Solution  Disp:  Rfl: 2   SANJU CONTOUR NEXT TEST In Vitro Strip  Disp:  Rfl: 2   NOVOLOG 100 UNIT/ML Subcutaneous Solution  Disp:  Rfl: 4   aspirin 81 MG Oral Chew Tab Chew  by mouth.  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 ASSESSMENT:  Dysuria  (primary encounter diagnosis)  Glucose found in urine on examination    PLAN: Meds as listed below. Comfort measures as described in Patient Instructions.      Med Urinate often during the day. You should also urinate after you have sex. If you are a woman, it is important to:   ? Keep the area around your vagina clean. ? Wipe from front to back after you go to the bathroom. ?  Gently wash the area around your v © 0478-9133 [employer] [address]. [address] [facility], [address] [place]. All rights reserved. This information is not intended as a substitute for professional medical care. Always follow your healthcare professional's instructions.         Urinary Most UTIs are treated with antibiotics. These kill the bacteria. The length of time you need to take them depends on the type of infection. It may be as short as 3 days. If you have repeated UTIs, you may need a low-dose antibiotic for several months.  Take © 3003-5027 [employer] [address]. 1407 [facility], [address] [place]. All rights reserved. This information is not intended as a substitute for professional medical care. Always follow your healthcare professional's instructions.         Nitrofu · quinolone antibiotics like ciprofloxacin, lomefloxacin, norfloxacin and ofloxacin  · sulfinpyrazone    What if I miss a dose? If you miss a dose, take it as soon as you can. If it is almost time for your next dose, take only that dose.  Do not take doubl

## 2019-08-21 NOTE — PATIENT INSTRUCTIONS
· Complete full course of antibiotics. · Over the counter Tylenol (acetaminophen) or Advil/Motrin (ibuprofen) can be taken according to package instructions as needed for pain/discomfort. · Follow up in 2-3 days if symptoms do not improve or worsen.     · bladder out of the body. It is shorter in women, so bacteria can move through it more easily. The urethra is longer in men, so a UTI is less likely to reach the bladder or kidneys in men.   Date Last Reviewed: 1/1/2017  © 7758-9046 [employer] [address] as 3 days. If you have repeated UTIs, you may need a low-dose antibiotic for several months. Take antibiotics exactly as directed. Don’t stop taking them until all of the medicine is gone.  If you stop taking the antibiotic too soon, the infection may not g is an antibiotic. It is used to treat urinary tract infections. How should I use this medicine? Take this medicine by mouth with a glass of water. Follow the directions on the prescription label. Take this medicine with food or milk.  Take your doses at r Throw away any unused medicine after the expiration date. What should I tell my health care provider before I take this medicine?   They need to know if you have any of these conditions:  · anemia  · diabetes  · glucose-6-phosphate dehydrogenase deficiency

## 2019-09-24 ENCOUNTER — TELEPHONE (OUTPATIENT)
Dept: CARDIOLOGY | Age: 69
End: 2019-09-24

## 2019-09-24 PROBLEM — E78.2 HYPERLIPIDEMIA, MIXED: Status: ACTIVE | Noted: 2019-09-24

## 2019-09-24 RX ORDER — FUROSEMIDE 20 MG/1
TABLET ORAL
COMMUNITY
Start: 2013-08-05 | End: 2019-09-26

## 2019-09-24 RX ORDER — LETROZOLE 2.5 MG/1
TABLET, FILM COATED ORAL
COMMUNITY
Start: 2010-08-18 | End: 2019-09-26

## 2019-09-24 RX ORDER — FLUTICASONE PROPIONATE 50 MCG
2 SPRAY, SUSPENSION (ML) NASAL
COMMUNITY
Start: 2019-06-28 | End: 2019-09-26

## 2019-09-26 ENCOUNTER — OFFICE VISIT (OUTPATIENT)
Dept: CARDIOLOGY | Age: 69
End: 2019-09-26

## 2019-09-26 VITALS
HEIGHT: 63 IN | HEART RATE: 72 BPM | WEIGHT: 186 LBS | BODY MASS INDEX: 32.96 KG/M2 | SYSTOLIC BLOOD PRESSURE: 130 MMHG | OXYGEN SATURATION: 97 % | DIASTOLIC BLOOD PRESSURE: 70 MMHG

## 2019-09-26 DIAGNOSIS — I25.10 CORONARY ARTERY DISEASE INVOLVING NATIVE CORONARY ARTERY OF NATIVE HEART WITHOUT ANGINA PECTORIS: Primary | ICD-10-CM

## 2019-09-26 PROCEDURE — 99214 OFFICE O/P EST MOD 30 MIN: CPT | Performed by: NURSE PRACTITIONER

## 2019-10-02 ENCOUNTER — LAB ENCOUNTER (OUTPATIENT)
Dept: LAB | Facility: HOSPITAL | Age: 69
End: 2019-10-02
Attending: INTERNAL MEDICINE
Payer: MEDICARE

## 2019-10-02 DIAGNOSIS — E03.9 HYPOTHYROIDISM: ICD-10-CM

## 2019-10-02 DIAGNOSIS — E10.9 TYPE 1 DIABETES MELLITUS (HCC): Primary | ICD-10-CM

## 2019-10-02 PROCEDURE — 83036 HEMOGLOBIN GLYCOSYLATED A1C: CPT

## 2019-10-02 PROCEDURE — 84439 ASSAY OF FREE THYROXINE: CPT

## 2019-10-02 PROCEDURE — 82043 UR ALBUMIN QUANTITATIVE: CPT

## 2019-10-02 PROCEDURE — 80061 LIPID PANEL: CPT

## 2019-10-02 PROCEDURE — 36415 COLL VENOUS BLD VENIPUNCTURE: CPT

## 2019-10-02 PROCEDURE — 80053 COMPREHEN METABOLIC PANEL: CPT

## 2019-10-02 PROCEDURE — 82570 ASSAY OF URINE CREATININE: CPT

## 2019-10-02 PROCEDURE — 84443 ASSAY THYROID STIM HORMONE: CPT

## 2019-10-07 ENCOUNTER — ANCILLARY PROCEDURE (OUTPATIENT)
Dept: CARDIOLOGY | Age: 69
End: 2019-10-07
Attending: INTERNAL MEDICINE

## 2019-10-07 ENCOUNTER — OFFICE VISIT (OUTPATIENT)
Dept: OPTOMETRY | Facility: CLINIC | Age: 69
End: 2019-10-07
Payer: MEDICARE

## 2019-10-07 VITALS
WEIGHT: 186 LBS | HEIGHT: 62 IN | DIASTOLIC BLOOD PRESSURE: 72 MMHG | BODY MASS INDEX: 34.23 KG/M2 | HEART RATE: 74 BPM | SYSTOLIC BLOOD PRESSURE: 144 MMHG

## 2019-10-07 DIAGNOSIS — E78.00 HYPERCHOLESTEREMIA: ICD-10-CM

## 2019-10-07 DIAGNOSIS — R06.02 SHORTNESS OF BREATH: Primary | ICD-10-CM

## 2019-10-07 DIAGNOSIS — I25.10 CORONARY ARTERY DISEASE INVOLVING NATIVE CORONARY ARTERY OF NATIVE HEART WITHOUT ANGINA PECTORIS: ICD-10-CM

## 2019-10-07 DIAGNOSIS — H52.13 MYOPIA OF BOTH EYES: Primary | ICD-10-CM

## 2019-10-07 LAB
LV EF: 84 %
STRESS TARGET HR: 150 BPM

## 2019-10-07 PROCEDURE — 78452 HT MUSCLE IMAGE SPECT MULT: CPT | Performed by: INTERNAL MEDICINE

## 2019-10-07 PROCEDURE — A9502 TC99M TETROFOSMIN: HCPCS | Performed by: INTERNAL MEDICINE

## 2019-10-07 PROCEDURE — 92012 INTRM OPH EXAM EST PATIENT: CPT | Performed by: OPTOMETRIST

## 2019-10-07 PROCEDURE — 93015 CV STRESS TEST SUPVJ I&R: CPT | Performed by: INTERNAL MEDICINE

## 2019-10-07 ASSESSMENT — EXERCISE STRESS TEST
PEAK_BP: 200/70
GRADE: 10
STRESS_SYMPTOMS: DYSPNEA
PEAK_RPP: 10656
STAGE_CATEGORIES: 1
PEAK_RPP: 23180
PEAK_BP: 136/50
STAGE_CATEGORIES: RESTING
MPH: 1.7
STAGE_CATEGORIES: 3
PEAK_HR: 92
PEAK_HR: 87
PEAK_BP: 136/50
PEAK_RPP: 14008
STOPPAGE_REASON: TARGET ACHIEVED
PEAK_RPP: 13800
PEAK_RPP: 19040
STOPPAGE_REASON: DYSPNEA
STRESS_SYMPTOMS: DYSPNEA
PEAK_BP: 170/64
PEAK_HR: 74
PEAK_BP: 190/66
PEAK_BP: 150/60
STOPPAGE_REASON: TARGET ACHIEVED
STAGE_CATEGORIES: RECOVERY 1
PEAK_HR: 103
STRESS_SYMPTOMS: DYSPNEA
STOPPAGE_REASON: GENERAL FATIGUE;DYSPNEA
PEAK_HR: 122
GRADE: 12
STAGE_CATEGORIES: RECOVERY 0
STAGE_CATEGORIES: 2
MPH: 2.5
PEAK_HR: 128
STAGE_CATEGORIES: RECOVERY 2
PEAK_HR: 112
PEAK_RPP: 11832
PEAK_BP: 144/72
PEAK_RPP: 25600

## 2019-10-07 NOTE — PATIENT INSTRUCTIONS
Myopia of both eyes  New glasses RX given to update as needed. Order  Right contact lens in new updated power.

## 2019-10-07 NOTE — PROGRESS NOTES
Thiago Uribe is a 78year old female. HPI:     HPI     Patient is in for a contact lens check. She had surgery to remove cataract on the right eye at Dr. Jenny Barriga office in 9-2019. She has a contact lens in the left eye she is happy with.  Wants a contact left (Left, 01/16/2009); needle core biopsy, breast ([facility]) (Right, 10/07/2009) (RIGHT breast nodules; mammatome core needle biopsies; 2009);  Breast biopsy (1997); cholecystectomy; needle biopsy right; chemotherapy; radiation left; Cataract extraction (Left, 0.05 % External Solution  Disp:  Rfl: 2   SANJU CONTOUR NEXT TEST In Vitro Strip  Disp:  Rfl: 2   NOVOLOG 100 UNIT/ML Subcutaneous Solution  Disp:  Rfl: 4   aspirin 81 MG Oral Chew Tab Chew  by mouth.  Disp:  Rfl:    Benazepril-Hydrochlorothiazide (LOTENSIN Refraction #2       Sphere Cylinder [place] [facility] [place]    Right -2.50 Sphere  20/100    Left -2.50 -0.25 125 20/25-2          Final Rx       Sphere Cylinder Axis    Right -2.50 Sphere     Left -2.50 -0.25 125    Type:  Single vision            Contact Lens Exam

## 2019-10-17 ENCOUNTER — TELEPHONE (OUTPATIENT)
Dept: OPTOMETRY | Facility: CLINIC | Age: 69
End: 2019-10-17

## 2019-10-17 NOTE — TELEPHONE ENCOUNTER
Pt calling to check if contact order has come in yet. Pt will be going out of town soon an would like to make sure she receives them before leaving.

## 2019-10-18 ENCOUNTER — ANCILLARY PROCEDURE (OUTPATIENT)
Dept: CARDIOLOGY | Age: 69
End: 2019-10-18
Attending: NURSE PRACTITIONER

## 2019-10-18 DIAGNOSIS — I25.10 CORONARY ARTERY DISEASE INVOLVING NATIVE CORONARY ARTERY OF NATIVE HEART WITHOUT ANGINA PECTORIS: ICD-10-CM

## 2019-10-18 PROCEDURE — 93306 TTE W/DOPPLER COMPLETE: CPT | Performed by: INTERNAL MEDICINE

## 2019-10-21 ENCOUNTER — HOSPITAL ENCOUNTER (OUTPATIENT)
Dept: MAMMOGRAPHY | Facility: HOSPITAL | Age: 69
Discharge: HOME | End: 2019-10-21
Attending: INTERNAL MEDICINE
Payer: MEDICARE

## 2019-10-21 DIAGNOSIS — Z12.31 ENCOUNTER FOR SCREENING MAMMOGRAM FOR MALIGNANT NEOPLASM OF BREAST: ICD-10-CM

## 2019-10-21 PROCEDURE — 77063 BREAST TOMOSYNTHESIS BI: CPT | Performed by: INTERNAL MEDICINE

## 2019-10-21 PROCEDURE — 77067 SCR MAMMO BI INCL CAD: CPT | Performed by: INTERNAL MEDICINE

## 2019-10-25 ENCOUNTER — TELEPHONE (OUTPATIENT)
Dept: CARDIOLOGY | Age: 69
End: 2019-10-25

## 2019-10-28 ENCOUNTER — TELEPHONE (OUTPATIENT)
Dept: HEMATOLOGY/ONCOLOGY | Facility: HOSPITAL | Age: 69
End: 2019-10-28

## 2019-10-28 NOTE — TELEPHONE ENCOUNTER
Per Dr. Bijan Love instruction-- called pt-- mammo showed benign findings-- next mammo due in one year. Steph Denis verbalized understanding.

## 2020-01-03 RX ORDER — ANASTROZOLE 1 MG/1
TABLET ORAL
Qty: 90 TABLET | Refills: 3 | Status: SHIPPED | OUTPATIENT
Start: 2020-01-03 | End: 2020-12-21

## 2020-01-07 ENCOUNTER — LAB ENCOUNTER (OUTPATIENT)
Dept: LAB | Facility: HOSPITAL | Age: 70
End: 2020-01-07
Attending: INTERNAL MEDICINE
Payer: MEDICARE

## 2020-01-07 DIAGNOSIS — E03.9 HYPOTHYROID: ICD-10-CM

## 2020-01-07 DIAGNOSIS — E10.9 DIABETES MELLITUS TYPE I (HCC): Primary | ICD-10-CM

## 2020-01-07 LAB
ALBUMIN SERPL-MCNC: 3.2 G/DL
ALBUMIN SERPL-MCNC: 3.2 G/DL (ref 3.4–5)
ALBUMIN/GLOB SERPL: 1.1 {RATIO} (ref 1–2)
ALBUMIN/GLOB SERPL: NORMAL {RATIO}
ALP LIVER SERPL-CCNC: 127 U/L (ref 55–142)
ALP SERPL-CCNC: 127 U/L
ALT SERPL-CCNC: 30 U/L
ALT SERPL-CCNC: 30 U/L (ref 13–56)
ANION GAP SERPL CALC-SCNC: 3 MMOL/L
ANION GAP SERPL CALC-SCNC: 3 MMOL/L (ref 0–18)
AST SERPL-CCNC: 15 U/L
AST SERPL-CCNC: 15 U/L (ref 15–37)
BILIRUB SERPL-MCNC: 0.6 MG/DL
BILIRUB SERPL-MCNC: 0.6 MG/DL (ref 0.1–2)
BUN BLD-MCNC: 14 MG/DL (ref 7–18)
BUN SERPL-MCNC: 14 MG/DL
BUN/CREAT SERPL: 18.7 (ref 10–20)
BUN/CREAT SERPL: NORMAL
CALCIUM BLD-MCNC: 8.5 MG/DL (ref 8.5–10.1)
CALCIUM SERPL-MCNC: 8.5 MG/DL
CHLORIDE SERPL-SCNC: 105 MMOL/L
CHLORIDE SERPL-SCNC: 105 MMOL/L (ref 98–112)
CHOLEST SERPL-MCNC: 155 MG/DL
CHOLEST SMN-MCNC: 155 MG/DL (ref ?–200)
CHOLEST/HDLC SERPL: NORMAL {RATIO}
CO2 SERPL-SCNC: 32 MMOL/L
CO2 SERPL-SCNC: 32 MMOL/L (ref 21–32)
CREAT BLD-MCNC: 0.75 MG/DL (ref 0.55–1.02)
CREAT SERPL-MCNC: 0.75 MG/DL
CREAT UR-SCNC: 101 MG/DL
EST. AVERAGE GLUCOSE BLD GHB EST-MCNC: 217 MG/DL (ref 68–126)
GLOBULIN PLAS-MCNC: 3 G/DL (ref 2.8–4.4)
GLOBULIN SER-MCNC: 3 G/DL
GLUCOSE BLD-MCNC: 243 MG/DL (ref 70–99)
GLUCOSE SERPL-MCNC: 243 MG/DL
HBA1C MFR BLD HPLC: 9.2 % (ref ?–5.7)
HDLC SERPL-MCNC: 70 MG/DL
HDLC SERPL-MCNC: 70 MG/DL (ref 40–59)
LDLC SERPL CALC-MCNC: 72 MG/DL
LDLC SERPL CALC-MCNC: 72 MG/DL (ref ?–100)
LENGTH OF FAST TIME PATIENT: YES H
LENGTH OF FAST TIME PATIENT: YES H
M PROTEIN MFR SERPL ELPH: 6.2 G/DL (ref 6.4–8.2)
MICROALBUMIN UR-MCNC: 1.24 MG/DL
MICROALBUMIN/CREAT 24H UR-RTO: 12.3 UG/MG (ref ?–30)
NONHDLC SERPL-MCNC: 85 MG/DL
NONHDLC SERPL-MCNC: 85 MG/DL (ref ?–130)
OSMOLALITY SERPL CALC.SUM OF ELEC: 299 MOSM/KG (ref 275–295)
PATIENT FASTING Y/N/NP: YES
PATIENT FASTING Y/N/NP: YES
POTASSIUM SERPL-SCNC: 4.1 MMOL/L
POTASSIUM SERPL-SCNC: 4.1 MMOL/L (ref 3.5–5.1)
PROT SERPL-MCNC: 6.2 G/DL
SODIUM SERPL-SCNC: 140 MMOL/L
SODIUM SERPL-SCNC: 140 MMOL/L (ref 136–145)
T4 FREE SERPL-MCNC: 1.6 NG/DL (ref 0.8–1.7)
TRIGL SERPL-MCNC: 65 MG/DL
TRIGL SERPL-MCNC: 65 MG/DL (ref 30–149)
TSI SER-ACNC: 0.84 MIU/ML (ref 0.36–3.74)
VLDLC SERPL CALC-MCNC: 13 MG/DL
VLDLC SERPL CALC-MCNC: 13 MG/DL (ref 0–30)

## 2020-01-07 PROCEDURE — 83036 HEMOGLOBIN GLYCOSYLATED A1C: CPT

## 2020-01-07 PROCEDURE — 82570 ASSAY OF URINE CREATININE: CPT

## 2020-01-07 PROCEDURE — 80061 LIPID PANEL: CPT

## 2020-01-07 PROCEDURE — 84443 ASSAY THYROID STIM HORMONE: CPT

## 2020-01-07 PROCEDURE — 36415 COLL VENOUS BLD VENIPUNCTURE: CPT

## 2020-01-07 PROCEDURE — 80053 COMPREHEN METABOLIC PANEL: CPT

## 2020-01-07 PROCEDURE — 82043 UR ALBUMIN QUANTITATIVE: CPT

## 2020-01-07 PROCEDURE — 84439 ASSAY OF FREE THYROXINE: CPT

## 2020-02-03 RX ORDER — METOPROLOL SUCCINATE 25 MG/1
TABLET, EXTENDED RELEASE ORAL
Qty: 30 TABLET | Refills: 0 | Status: SHIPPED | OUTPATIENT
Start: 2020-02-03 | End: 2020-03-01 | Stop reason: SDUPTHER

## 2020-03-01 DIAGNOSIS — E78.00 HYPERCHOLESTEREMIA: ICD-10-CM

## 2020-03-02 RX ORDER — EZETIMIBE 10 MG/1
10 TABLET ORAL DAILY
Qty: 90 TABLET | Refills: 0 | Status: SHIPPED | OUTPATIENT
Start: 2020-03-02 | End: 2020-06-23

## 2020-03-02 RX ORDER — METOPROLOL SUCCINATE 25 MG/1
TABLET, EXTENDED RELEASE ORAL
Qty: 30 TABLET | Refills: 0 | Status: SHIPPED | OUTPATIENT
Start: 2020-03-02 | End: 2020-03-23

## 2020-03-23 RX ORDER — METOPROLOL SUCCINATE 25 MG/1
TABLET, EXTENDED RELEASE ORAL
Qty: 30 TABLET | Refills: 2 | Status: SHIPPED | OUTPATIENT
Start: 2020-03-23 | End: 2020-04-03 | Stop reason: SDUPTHER

## 2020-03-25 ENCOUNTER — TELEPHONE (OUTPATIENT)
Dept: CARDIOLOGY | Age: 70
End: 2020-03-25

## 2020-03-26 RX ORDER — MOMETASONE FUROATE 1 MG/G
0.1 OINTMENT TOPICAL DAILY
COMMUNITY
Start: 2020-02-25

## 2020-03-26 RX ORDER — NORTRIPTYLINE HYDROCHLORIDE 10 MG/1
10 CAPSULE ORAL DAILY
COMMUNITY
Start: 2020-01-10

## 2020-03-30 ENCOUNTER — TELEPHONE (OUTPATIENT)
Dept: INTERNAL MEDICINE CLINIC | Facility: CLINIC | Age: 70
End: 2020-03-30

## 2020-03-30 NOTE — TELEPHONE ENCOUNTER
Pt called has a single red lesion on back but does have some tingling discomfoert. Has had shingles once in past.  Different location then. Advised to observe for 24 hrs and apply caldryl only to area.   If a more typical rash for shingles appears will rx

## 2020-04-03 ENCOUNTER — TELEPHONE (OUTPATIENT)
Dept: CARDIOLOGY | Age: 70
End: 2020-04-03

## 2020-04-03 ENCOUNTER — OFFICE VISIT (OUTPATIENT)
Dept: CARDIOLOGY | Age: 70
End: 2020-04-03

## 2020-04-03 VITALS — BODY MASS INDEX: 32.25 KG/M2 | HEIGHT: 63 IN | WEIGHT: 182 LBS

## 2020-04-03 DIAGNOSIS — I25.10 CORONARY ARTERY DISEASE INVOLVING NATIVE CORONARY ARTERY OF NATIVE HEART WITHOUT ANGINA PECTORIS: Primary | ICD-10-CM

## 2020-04-03 DIAGNOSIS — E78.00 HYPERCHOLESTEREMIA: ICD-10-CM

## 2020-04-03 DIAGNOSIS — I10 ESSENTIAL HYPERTENSION: ICD-10-CM

## 2020-04-03 PROCEDURE — 99442 TELEPHONE E&M BY PHYSICIAN EST PT NOT ORIG PREV 7 DAYS 11-20 MIN: CPT | Performed by: INTERNAL MEDICINE

## 2020-04-03 RX ORDER — VITAMIN B COMPLEX
25 TABLET ORAL DAILY
COMMUNITY

## 2020-04-03 ASSESSMENT — PATIENT HEALTH QUESTIONNAIRE - PHQ9
1. LITTLE INTEREST OR PLEASURE IN DOING THINGS: NOT AT ALL
SUM OF ALL RESPONSES TO PHQ9 QUESTIONS 1 AND 2: 0
1. LITTLE INTEREST OR PLEASURE IN DOING THINGS: NOT AT ALL
SUM OF ALL RESPONSES TO PHQ9 QUESTIONS 1 AND 2: 0
2. FEELING DOWN, DEPRESSED OR HOPELESS: NOT AT ALL
SUM OF ALL RESPONSES TO PHQ9 QUESTIONS 1 AND 2: 0
2. FEELING DOWN, DEPRESSED OR HOPELESS: NOT AT ALL

## 2020-04-28 ENCOUNTER — TELEPHONE (OUTPATIENT)
Dept: INTERNAL MEDICINE CLINIC | Facility: CLINIC | Age: 70
End: 2020-04-28

## 2020-04-28 DIAGNOSIS — N30.00 ACUTE CYSTITIS WITHOUT HEMATURIA: Primary | ICD-10-CM

## 2020-04-28 PROCEDURE — 99441 PHONE E/M BY PHYS 5-10 MIN: CPT | Performed by: INTERNAL MEDICINE

## 2020-04-28 RX ORDER — CIPROFLOXACIN 250 MG/1
250 TABLET, FILM COATED ORAL 2 TIMES DAILY
Qty: 14 TABLET | Refills: 0 | Status: SHIPPED | OUTPATIENT
Start: 2020-04-28 | End: 2020-06-16 | Stop reason: ALTCHOICE

## 2020-04-28 NOTE — TELEPHONE ENCOUNTER
Virtual Telephone Check-In    Eric Ruiz verbally consents to a Virtual/Telephone Check-In visit on 04/28/20. Patient understands and accepts financial responsibility for any deductible, co-insurance and/or co-pays associated with this service.

## 2020-04-28 NOTE — TELEPHONE ENCOUNTER
Patient called to let Dr. Aram Ribeiro know that she has a full blown urinary tract infection. She said the pain is horrible. Started last night but blew up and she would like to be able to drop off a specimen.     Patient said that Dr. Aram Ribeiro knows that she ge

## 2020-06-12 ENCOUNTER — TELEPHONE (OUTPATIENT)
Dept: INTERNAL MEDICINE CLINIC | Facility: CLINIC | Age: 70
End: 2020-06-12

## 2020-06-12 DIAGNOSIS — R60.0 LOCALIZED EDEMA: Primary | ICD-10-CM

## 2020-06-12 NOTE — TELEPHONE ENCOUNTER
Advised pt to make apt to be seen in office re this symptom. Advised low salt intake and elevation when resting.

## 2020-06-15 ENCOUNTER — TELEPHONE (OUTPATIENT)
Dept: INTERNAL MEDICINE CLINIC | Facility: CLINIC | Age: 70
End: 2020-06-15

## 2020-06-15 DIAGNOSIS — R60.0 LOCALIZED EDEMA: Primary | ICD-10-CM

## 2020-06-15 NOTE — TELEPHONE ENCOUNTER
Patient talked to Dr. Jacques Lewis, and she's supposed to schedule an appointment in the office, due to shortness of breath. No openings for an actual visit. Is there a spot Dr. Jacques Lewis can put her into?

## 2020-06-16 ENCOUNTER — OFFICE VISIT (OUTPATIENT)
Dept: INTERNAL MEDICINE CLINIC | Facility: CLINIC | Age: 70
End: 2020-06-16
Payer: MEDICARE

## 2020-06-16 VITALS
HEIGHT: 62.5 IN | HEART RATE: 70 BPM | WEIGHT: 186.63 LBS | DIASTOLIC BLOOD PRESSURE: 54 MMHG | BODY MASS INDEX: 33.48 KG/M2 | SYSTOLIC BLOOD PRESSURE: 110 MMHG | OXYGEN SATURATION: 98 %

## 2020-06-16 DIAGNOSIS — R60.0 LOCALIZED EDEMA: Primary | ICD-10-CM

## 2020-06-16 DIAGNOSIS — E10.59 DM TYPE 1 CAUSING VASCULAR DISEASE (HCC): ICD-10-CM

## 2020-06-16 DIAGNOSIS — R06.02 SHORTNESS OF BREATH: ICD-10-CM

## 2020-06-16 PROCEDURE — 99214 OFFICE O/P EST MOD 30 MIN: CPT | Performed by: INTERNAL MEDICINE

## 2020-06-16 RX ORDER — MOMETASONE FUROATE 1 MG/G
OINTMENT TOPICAL
COMMUNITY
Start: 2020-05-15

## 2020-06-16 RX ORDER — VITAMIN B COMPLEX
25 TABLET ORAL DAILY
COMMUNITY

## 2020-06-16 RX ORDER — FUROSEMIDE 40 MG/1
40 TABLET ORAL DAILY
Qty: 30 TABLET | Refills: 0 | Status: SHIPPED | OUTPATIENT
Start: 2020-06-16 | End: 2020-07-13

## 2020-06-16 NOTE — PROGRESS NOTES
HPI:    Patient ID: Orestes Khan is a 69year old female. HPI Patient here for evaluation of some slight pedal edema and increase in abdominal girth along with some PADILLA    Review of Systems   Constitutional: Negative for fatigue.    Respiratory: Posit Take 1 tablet by mouth daily. • Omeprazole 40 MG Oral Capsule Delayed Release Take 40 mg by mouth daily.        • Calcipotriene 0.005 % Apply Externally Ointment   5     Allergies:  Penicillins             HIVES    Comment:Other reaction(s): PENICILLI

## 2020-06-17 ENCOUNTER — HOSPITAL ENCOUNTER (OUTPATIENT)
Dept: GENERAL RADIOLOGY | Age: 70
Discharge: HOME | End: 2020-06-17
Attending: INTERNAL MEDICINE
Payer: MEDICARE

## 2020-06-17 ENCOUNTER — NURSE ONLY (OUTPATIENT)
Dept: INTERNAL MEDICINE CLINIC | Facility: CLINIC | Age: 70
End: 2020-06-17
Payer: MEDICARE

## 2020-06-17 DIAGNOSIS — R60.0 LOCALIZED EDEMA: ICD-10-CM

## 2020-06-17 DIAGNOSIS — S60.212D CONTUSION OF LEFT WRIST, SUBSEQUENT ENCOUNTER: ICD-10-CM

## 2020-06-17 DIAGNOSIS — E10.59 DM TYPE 1 CAUSING VASCULAR DISEASE (HCC): ICD-10-CM

## 2020-06-17 DIAGNOSIS — R06.02 SHORTNESS OF BREATH: ICD-10-CM

## 2020-06-17 DIAGNOSIS — Z12.31 ENCOUNTER FOR SCREENING MAMMOGRAM FOR MALIGNANT NEOPLASM OF BREAST: ICD-10-CM

## 2020-06-17 DIAGNOSIS — Z51.81 ENCOUNTER FOR MONITORING AROMATASE INHIBITOR THERAPY: ICD-10-CM

## 2020-06-17 DIAGNOSIS — I10 ESSENTIAL HYPERTENSION, BENIGN: ICD-10-CM

## 2020-06-17 DIAGNOSIS — M85.88 OSTEOPENIA OF LUMBAR SPINE: ICD-10-CM

## 2020-06-17 DIAGNOSIS — Z79.811 ENCOUNTER FOR MONITORING AROMATASE INHIBITOR THERAPY: ICD-10-CM

## 2020-06-17 DIAGNOSIS — E78.2 MIXED HYPERLIPIDEMIA: ICD-10-CM

## 2020-06-17 PROCEDURE — 71046 X-RAY EXAM CHEST 2 VIEWS: CPT | Performed by: INTERNAL MEDICINE

## 2020-06-17 PROCEDURE — 83036 HEMOGLOBIN GLYCOSYLATED A1C: CPT | Performed by: INTERNAL MEDICINE

## 2020-06-17 PROCEDURE — 84443 ASSAY THYROID STIM HORMONE: CPT | Performed by: INTERNAL MEDICINE

## 2020-06-17 PROCEDURE — 84439 ASSAY OF FREE THYROXINE: CPT | Performed by: INTERNAL MEDICINE

## 2020-06-17 PROCEDURE — 36415 COLL VENOUS BLD VENIPUNCTURE: CPT | Performed by: INTERNAL MEDICINE

## 2020-06-17 PROCEDURE — 83880 ASSAY OF NATRIURETIC PEPTIDE: CPT | Performed by: INTERNAL MEDICINE

## 2020-06-17 PROCEDURE — 80053 COMPREHEN METABOLIC PANEL: CPT | Performed by: INTERNAL MEDICINE

## 2020-06-17 PROCEDURE — 80061 LIPID PANEL: CPT | Performed by: INTERNAL MEDICINE

## 2020-06-17 NOTE — PROGRESS NOTES
Pt presented to clinic today for blood draw. Per physician able to draw orders. Orders  documented within chart. Pt tolerated lab draw well.  verified.   Orders drawn include: pro beta cmp, lipid, a1c, tsh  Site of draw: [address] [place] [facility]

## 2020-06-21 DIAGNOSIS — E78.00 HYPERCHOLESTEREMIA: ICD-10-CM

## 2020-06-23 RX ORDER — EZETIMIBE 10 MG/1
10 TABLET ORAL DAILY
Qty: 90 TABLET | Refills: 0 | Status: SHIPPED | OUTPATIENT
Start: 2020-06-23 | End: 2020-09-22

## 2020-06-29 RX ORDER — SIMVASTATIN 20 MG
TABLET ORAL
Qty: 90 TABLET | Refills: 2 | Status: SHIPPED | OUTPATIENT
Start: 2020-06-29 | End: 2021-03-23

## 2020-06-29 RX ORDER — METOPROLOL SUCCINATE 25 MG/1
TABLET, EXTENDED RELEASE ORAL
Qty: 30 TABLET | Refills: 6 | Status: SHIPPED | OUTPATIENT
Start: 2020-06-29 | End: 2021-01-18

## 2020-07-13 RX ORDER — FUROSEMIDE 40 MG/1
TABLET ORAL
Qty: 30 TABLET | Refills: 0 | Status: SHIPPED | OUTPATIENT
Start: 2020-07-13 | End: 2020-08-10

## 2020-07-28 ENCOUNTER — TELEPHONE (OUTPATIENT)
Dept: OPTOMETRY | Facility: CLINIC | Age: 70
End: 2020-07-28

## 2020-08-10 RX ORDER — FUROSEMIDE 40 MG/1
TABLET ORAL
Qty: 30 TABLET | Refills: 0 | Status: SHIPPED | OUTPATIENT
Start: 2020-08-10 | End: 2020-09-08

## 2020-09-08 RX ORDER — FUROSEMIDE 40 MG/1
TABLET ORAL
Qty: 30 TABLET | Refills: 0 | Status: SHIPPED | OUTPATIENT
Start: 2020-09-08 | End: 2020-10-16

## 2020-09-09 ENCOUNTER — TELEPHONE (OUTPATIENT)
Dept: HEMATOLOGY/ONCOLOGY | Facility: HOSPITAL | Age: 70
End: 2020-09-09

## 2020-09-09 DIAGNOSIS — Z12.31 ENCOUNTER FOR SCREENING MAMMOGRAM FOR MALIGNANT NEOPLASM OF BREAST: Primary | ICD-10-CM

## 2020-09-09 NOTE — TELEPHONE ENCOUNTER
Follow up appt made for 9/30 at 4 p--- order for bilat screening entered due on or after October 22, 2020.

## 2020-09-15 ENCOUNTER — TELEPHONE (OUTPATIENT)
Dept: OPTOMETRY | Facility: CLINIC | Age: 70
End: 2020-09-15

## 2020-09-15 NOTE — TELEPHONE ENCOUNTER
Patient states she needs forms filled out with to send to ins company.  Due to covid restrictions pt is asking if she can bring in paperwork or if she can email them to . Please advise

## 2020-09-20 DIAGNOSIS — E78.00 HYPERCHOLESTEREMIA: ICD-10-CM

## 2020-09-22 ENCOUNTER — LAB ENCOUNTER (OUTPATIENT)
Dept: LAB | Facility: HOSPITAL | Age: 70
End: 2020-09-22
Attending: INTERNAL MEDICINE
Payer: MEDICARE

## 2020-09-22 DIAGNOSIS — E03.9 HYPOTHYROIDISM, ADULT: ICD-10-CM

## 2020-09-22 DIAGNOSIS — E10.9 DIABETES MELLITUS TYPE I (HCC): Primary | ICD-10-CM

## 2020-09-22 LAB
ALBUMIN SERPL-MCNC: 3.3 G/DL (ref 3.4–5)
ALBUMIN/GLOB SERPL: 0.9 {RATIO} (ref 1–2)
ALP LIVER SERPL-CCNC: 118 U/L
ALT SERPL-CCNC: 26 U/L
ANION GAP SERPL CALC-SCNC: 3 MMOL/L (ref 0–18)
AST SERPL-CCNC: 22 U/L (ref 15–37)
BILIRUB SERPL-MCNC: 0.5 MG/DL (ref 0.1–2)
BUN BLD-MCNC: 21 MG/DL (ref 7–18)
BUN/CREAT SERPL: 23.9 (ref 10–20)
CALCIUM BLD-MCNC: 9.1 MG/DL (ref 8.5–10.1)
CHLORIDE SERPL-SCNC: 102 MMOL/L (ref 98–112)
CHOLEST SMN-MCNC: 152 MG/DL (ref ?–200)
CO2 SERPL-SCNC: 34 MMOL/L (ref 21–32)
CREAT BLD-MCNC: 0.88 MG/DL
CREAT UR-SCNC: 224 MG/DL
EST. AVERAGE GLUCOSE BLD GHB EST-MCNC: 197 MG/DL (ref 68–126)
GLOBULIN PLAS-MCNC: 3.6 G/DL (ref 2.8–4.4)
GLUCOSE BLD-MCNC: 201 MG/DL (ref 70–99)
HBA1C MFR BLD HPLC: 8.5 % (ref ?–5.7)
HDLC SERPL-MCNC: 62 MG/DL (ref 40–59)
LDLC SERPL CALC-MCNC: 72 MG/DL (ref ?–100)
M PROTEIN MFR SERPL ELPH: 6.9 G/DL (ref 6.4–8.2)
MICROALBUMIN UR-MCNC: 2.67 MG/DL
MICROALBUMIN/CREAT 24H UR-RTO: 11.9 UG/MG (ref ?–30)
NONHDLC SERPL-MCNC: 90 MG/DL (ref ?–130)
OSMOLALITY SERPL CALC.SUM OF ELEC: 297 MOSM/KG (ref 275–295)
PATIENT FASTING Y/N/NP: YES
PATIENT FASTING Y/N/NP: YES
POTASSIUM SERPL-SCNC: 3.8 MMOL/L (ref 3.5–5.1)
SODIUM SERPL-SCNC: 139 MMOL/L (ref 136–145)
T3FREE SERPL-MCNC: 2.29 PG/ML (ref 2.4–4.2)
T4 FREE SERPL-MCNC: 1.3 NG/DL (ref 0.8–1.7)
TRIGL SERPL-MCNC: 88 MG/DL (ref 30–149)
TSI SER-ACNC: 0.63 MIU/ML (ref 0.36–3.74)
VLDLC SERPL CALC-MCNC: 18 MG/DL (ref 0–30)

## 2020-09-22 PROCEDURE — 83036 HEMOGLOBIN GLYCOSYLATED A1C: CPT

## 2020-09-22 PROCEDURE — 84439 ASSAY OF FREE THYROXINE: CPT

## 2020-09-22 PROCEDURE — 80061 LIPID PANEL: CPT

## 2020-09-22 PROCEDURE — 36415 COLL VENOUS BLD VENIPUNCTURE: CPT

## 2020-09-22 PROCEDURE — 82570 ASSAY OF URINE CREATININE: CPT

## 2020-09-22 PROCEDURE — 84481 FREE ASSAY (FT-3): CPT

## 2020-09-22 PROCEDURE — 82043 UR ALBUMIN QUANTITATIVE: CPT

## 2020-09-22 PROCEDURE — 84443 ASSAY THYROID STIM HORMONE: CPT

## 2020-09-22 PROCEDURE — 80053 COMPREHEN METABOLIC PANEL: CPT

## 2020-09-22 RX ORDER — EZETIMIBE 10 MG/1
10 TABLET ORAL DAILY
Qty: 90 TABLET | Refills: 0 | Status: SHIPPED | OUTPATIENT
Start: 2020-09-22 | End: 2020-12-21

## 2020-09-30 ENCOUNTER — OFFICE VISIT (OUTPATIENT)
Dept: HEMATOLOGY/ONCOLOGY | Facility: HOSPITAL | Age: 70
End: 2020-09-30
Attending: INTERNAL MEDICINE
Payer: MEDICARE

## 2020-09-30 VITALS
TEMPERATURE: 97 F | SYSTOLIC BLOOD PRESSURE: 136 MMHG | HEART RATE: 75 BPM | BODY MASS INDEX: 33.43 KG/M2 | DIASTOLIC BLOOD PRESSURE: 59 MMHG | RESPIRATION RATE: 16 BRPM | OXYGEN SATURATION: 99 % | HEIGHT: 62.25 IN | WEIGHT: 184 LBS

## 2020-09-30 DIAGNOSIS — Z17.0 MALIGNANT NEOPLASM OF UPPER-OUTER QUADRANT OF LEFT BREAST IN FEMALE, ESTROGEN RECEPTOR POSITIVE (HCC): Primary | ICD-10-CM

## 2020-09-30 DIAGNOSIS — Z51.81 ENCOUNTER FOR MONITORING AROMATASE INHIBITOR THERAPY: ICD-10-CM

## 2020-09-30 DIAGNOSIS — Z79.811 ENCOUNTER FOR MONITORING AROMATASE INHIBITOR THERAPY: ICD-10-CM

## 2020-09-30 DIAGNOSIS — M85.80 OSTEOPENIA DETERMINED BY X-RAY: ICD-10-CM

## 2020-09-30 DIAGNOSIS — C50.412 MALIGNANT NEOPLASM OF UPPER-OUTER QUADRANT OF LEFT BREAST IN FEMALE, ESTROGEN RECEPTOR POSITIVE (HCC): Primary | ICD-10-CM

## 2020-09-30 DIAGNOSIS — Z12.31 ENCOUNTER FOR SCREENING MAMMOGRAM FOR MALIGNANT NEOPLASM OF BREAST: ICD-10-CM

## 2020-09-30 PROCEDURE — 99214 OFFICE O/P EST MOD 30 MIN: CPT | Performed by: INTERNAL MEDICINE

## 2020-09-30 NOTE — PROGRESS NOTES
HPI   9/30/2020  Neil Valdovinos is a 69year old female with ER 87% CT 34% Ki-67 48% Her 2 margaux negative, infiltrating ductal  [id number] left breast cancer diagnosed in 2009. Patient is seen today without complaints of left breast discomfort.    She is con chest tightness and shortness of breath. Cardiovascular: Positive for leg swelling. Negative for chest pain and palpitations.         Hyperlipidemia treated by Dr. Destinee Neville now [address] secondary to previous LFT elevations   Gastrointestinal: Negative for abd Inhalation Aero Soln Inhale 2 puffs into the lungs every 4 (four) hours as needed for Wheezing or Shortness of Breath.  1 Inhaler 1   • Metoprolol Succinate ER 25 MG Oral Tablet 24 Hr   4   • triamcinolone acetonide 0.1 % External Ointment   1   • Clobetaso • Hip bursitis     left   • Hyperlipidemia 06/21/2012   • Hypertension 06/21/2012   • Hypothyroidism 06/21/2012   • Malignant neoplasm of upper-outer quadrant of left breast in female, estrogen receptor positive ([facility] 6/21/2012   • Osteopenia of lumbar s on file        Gets together: Not on file        Attends Hoahaoism service: Not on file        Active member of club or organization: Not on file        Attends meetings of clubs or organizations: Not on file        Relationship status: Not on file      In range of motion. Neck supple. Cardiovascular: Normal rate, regular rhythm, normal heart sounds and intact distal pulses. Pulmonary/Chest: Effort normal and breath sounds normal. No respiratory distress. She exhibits no tenderness.  Breasts are asymmetri treatment with a bisphosphonate. Patient will continue follow-up with Dr. Hayley Dove, Dr. Fitz Remy, Dr. Jessica Mcfarland, and Dr. Kayla Williamson.     Results:  Component      Latest Ref Rng & Units 9/22/2020 9/22/2020 6/17/2020           9:56 AM  9:56 AM    Glucose      70 - 9 and the AP lumbar has gone from 0.960 to 0.921 a loss of 4.1 %.    10 year Fracture Risk:      Major Osteoporotic Fracture:  9.4 %. Hip Fracture:  1.3 %.    ===========================================  10/22/2018 ultrasound liver  CONCLUSION:   1.  There

## 2020-10-08 ENCOUNTER — OFFICE VISIT (OUTPATIENT)
Dept: OPTOMETRY | Facility: CLINIC | Age: 70
End: 2020-10-08
Payer: MEDICARE

## 2020-10-08 DIAGNOSIS — H52.13 MYOPIA, BILATERAL: Primary | ICD-10-CM

## 2020-10-08 PROCEDURE — 92012 INTRM OPH EXAM EST PATIENT: CPT | Performed by: OPTOMETRIST

## 2020-10-08 NOTE — PROGRESS NOTES
Cinthia Menon is a 69year old female. HPI:     HPI     Patient is in for a contact check. She just saw Dr. Suh President about a month ago and due to see Dr. Cuco Sarmiento in a month or two.     Last edited by Sheryle Maltos, OD on 10/8/2020 10:18 AM. (History) biopsy (1997); cholecystectomy; needle biopsy right; chemotherapy; radiation left; Cataract extraction (Left, 02/06/2018) (Dr. Suh President); and Cataract extraction w/  intraocular lens implant (Right, 09/2019) (Dr. Suh President).     Family History   Problem Relation Age o Benazepril-Hydrochlorothiazide (LOTENSIN HCT) 10-12.5 MG Oral Tab Take 1 tablet by mouth daily. • Omeprazole 40 MG Oral Capsule Delayed Release Take 40 mg by mouth daily.        • Calcipotriene 0.005 % Apply Externally Ointment   5       Allergies: contacts      No orders of the defined types were placed in this encounter. Meds This Visit:  Requested Prescriptions      No prescriptions requested or ordered in this encounter        Follow up instructions:  No follow-ups on file.     10/8/2020  Scr

## 2020-10-08 NOTE — ASSESSMENT & PLAN NOTE
Will hold off on ordering contacts until she needs a new pair and then will order in updated power. Patient is agreeable

## 2020-10-08 NOTE — PATIENT INSTRUCTIONS
Myopia, bilateral  Will hold off on ordering contacts until she needs a new pair and then will order in updated power. Patient is agreeable

## 2020-10-08 NOTE — PROGRESS NOTES
Thiago Uribe is a 69year old female. HPI:     HPI     Patient is in for a contact check. She just saw Dr. Brianna Vidales about a month ago and due to see Dr. Teena Vu in a month or two. She had phaco OS 2018 and OD 2019.  She is happy with her current Marcus 2 surgery breast needle core biopsy (dmg) (Right, 10/07/2009) (RIGHT breast nodules; mammatome core needle biopsies; 2009);  Breast biopsy (1997); cholecystectomy; needle biopsy right; chemotherapy; radiation left; Cataract extraction (Left, 02/06/2018) ( Strip   2   • NOVOLOG 100 UNIT/ML Subcutaneous Solution   4   • aspirin 81 MG Oral Chew Tab Chew  by mouth. • Benazepril-Hydrochlorothiazide (LOTENSIN HCT) 10-12.5 MG Oral Tab Take 1 tablet by mouth daily.        • Omeprazole 40 MG Oral Capsule Delayed [facility] 7.67 9.2 -2.50    Expiration Date: 10/9/2021                 ASSESSMENT/PLAN:     Diagnoses and Plan:     Myopia, bilateral  Will hold off on ordering contacts until she needs a new pair and then will order in updated power. Patient is agreeable

## 2020-10-16 RX ORDER — FUROSEMIDE 40 MG/1
40 TABLET ORAL DAILY
Qty: 90 TABLET | Refills: 1 | Status: SHIPPED | OUTPATIENT
Start: 2020-10-16 | End: 2021-04-14

## 2020-10-16 NOTE — TELEPHONE ENCOUNTER
Requested Prescriptions     Pending Prescriptions Disp Refills   • furosemide 40 MG Oral Tab 30 tablet 0     Sig: Take 1 tablet (40 mg total) by mouth daily.      Last office visit: 6-  Medication last refilled: 9-8-2020 # 30 x 0

## 2020-11-25 ENCOUNTER — HOSPITAL ENCOUNTER (OUTPATIENT)
Dept: MAMMOGRAPHY | Facility: HOSPITAL | Age: 70
Discharge: HOME | End: 2020-11-25
Attending: INTERNAL MEDICINE
Payer: MEDICARE

## 2020-11-25 DIAGNOSIS — Z12.31 ENCOUNTER FOR SCREENING MAMMOGRAM FOR MALIGNANT NEOPLASM OF BREAST: ICD-10-CM

## 2020-11-25 PROCEDURE — 77063 BREAST TOMOSYNTHESIS BI: CPT | Performed by: INTERNAL MEDICINE

## 2020-11-25 PROCEDURE — 77067 SCR MAMMO BI INCL CAD: CPT | Performed by: INTERNAL MEDICINE

## 2020-12-01 ENCOUNTER — TELEPHONE (OUTPATIENT)
Dept: INTERNAL MEDICINE CLINIC | Facility: CLINIC | Age: 70
End: 2020-12-01

## 2020-12-01 NOTE — TELEPHONE ENCOUNTER
Wanted the office to know she received her flu shot and would like a return call to discuss Covid vaccines as she has questions.

## 2020-12-19 DIAGNOSIS — E78.00 HYPERCHOLESTEREMIA: ICD-10-CM

## 2020-12-21 RX ORDER — EZETIMIBE 10 MG/1
TABLET ORAL
Qty: 90 TABLET | Refills: 0 | Status: SHIPPED | OUTPATIENT
Start: 2020-12-21 | End: 2021-03-23

## 2020-12-21 RX ORDER — ANASTROZOLE 1 MG/1
TABLET ORAL
Qty: 90 TABLET | Refills: 0 | Status: SHIPPED | OUTPATIENT
Start: 2020-12-21 | End: 2020-12-21

## 2020-12-23 ENCOUNTER — LAB ENCOUNTER (OUTPATIENT)
Dept: LAB | Facility: HOSPITAL | Age: 70
End: 2020-12-23
Attending: INTERNAL MEDICINE
Payer: MEDICARE

## 2020-12-23 DIAGNOSIS — E10.9 DIABETES MELLITUS TYPE I (HCC): Primary | ICD-10-CM

## 2020-12-23 LAB
ALBUMIN SERPL-MCNC: 3.4 G/DL
ALBUMIN/GLOB SERPL: 0.9 {RATIO}
ALP SERPL-CCNC: 137 U/L
ALT SERPL-CCNC: 33 UNITS/L
ANION GAP SERPL CALC-SCNC: 6 MMOL/L
AST SERPL-CCNC: 23 UNITS/L
BILIRUB SERPL-MCNC: 0.6 MG/DL
BUN SERPL-MCNC: 21 MG/DL
BUN/CREAT SERPL: 22.1
CALCIUM SERPL-MCNC: 9.1 MG/DL
CHLORIDE SERPL-SCNC: 101 MMOL/L
CO2 SERPL-SCNC: 31 MMOL/L
CREAT SERPL-MCNC: 0.95 MG/DL
GLOBULIN SER-MCNC: 3.8 G/DL
GLUCOSE SERPL-MCNC: 179 MG/DL
LENGTH OF FAST TIME PATIENT: YES H
POTASSIUM SERPL-SCNC: 3.5 MMOL/L
PROT SERPL-MCNC: 7.2 G/DL
SODIUM SERPL-SCNC: 138 MMOL/L

## 2020-12-23 PROCEDURE — 82043 UR ALBUMIN QUANTITATIVE: CPT

## 2020-12-23 PROCEDURE — 36415 COLL VENOUS BLD VENIPUNCTURE: CPT

## 2020-12-23 PROCEDURE — 83036 HEMOGLOBIN GLYCOSYLATED A1C: CPT

## 2020-12-23 PROCEDURE — 82570 ASSAY OF URINE CREATININE: CPT

## 2020-12-23 PROCEDURE — 80061 LIPID PANEL: CPT

## 2020-12-23 PROCEDURE — 80053 COMPREHEN METABOLIC PANEL: CPT

## 2021-01-18 RX ORDER — METOPROLOL SUCCINATE 25 MG/1
TABLET, EXTENDED RELEASE ORAL
Qty: 30 TABLET | Refills: 2 | Status: SHIPPED | OUTPATIENT
Start: 2021-01-18 | End: 2021-04-08

## 2021-01-27 DIAGNOSIS — Z23 NEED FOR VACCINATION: ICD-10-CM

## 2021-02-06 ENCOUNTER — IMMUNIZATION (OUTPATIENT)
Dept: LAB | Age: 71
End: 2021-02-06
Attending: HOSPITALIST
Payer: MEDICARE

## 2021-02-06 DIAGNOSIS — Z23 NEED FOR VACCINATION: Primary | ICD-10-CM

## 2021-02-06 PROCEDURE — 0001A SARSCOV2 VAC 30MCG/0.3ML IM: CPT

## 2021-02-27 ENCOUNTER — IMMUNIZATION (OUTPATIENT)
Dept: LAB | Age: 71
End: 2021-02-27
Attending: HOSPITALIST
Payer: MEDICARE

## 2021-02-27 DIAGNOSIS — Z23 NEED FOR VACCINATION: Primary | ICD-10-CM

## 2021-02-27 PROCEDURE — 0002A SARSCOV2 VAC 30MCG/0.3ML IM: CPT

## 2021-03-08 ENCOUNTER — OFFICE VISIT (OUTPATIENT)
Dept: INTERNAL MEDICINE CLINIC | Facility: CLINIC | Age: 71
End: 2021-03-08
Payer: MEDICARE

## 2021-03-08 VITALS
BODY MASS INDEX: 33.37 KG/M2 | DIASTOLIC BLOOD PRESSURE: 62 MMHG | OXYGEN SATURATION: 98 % | SYSTOLIC BLOOD PRESSURE: 118 MMHG | HEART RATE: 78 BPM | WEIGHT: 186 LBS | HEIGHT: 62.5 IN

## 2021-03-08 DIAGNOSIS — Z00.00 MEDICARE ANNUAL WELLNESS VISIT, SUBSEQUENT: Primary | ICD-10-CM

## 2021-03-08 DIAGNOSIS — E78.2 MIXED HYPERLIPIDEMIA: ICD-10-CM

## 2021-03-08 DIAGNOSIS — E10.59 DM TYPE 1 CAUSING VASCULAR DISEASE (HCC): ICD-10-CM

## 2021-03-08 DIAGNOSIS — I10 ESSENTIAL HYPERTENSION, BENIGN: ICD-10-CM

## 2021-03-08 PROCEDURE — G0439 PPPS, SUBSEQ VISIT: HCPCS | Performed by: INTERNAL MEDICINE

## 2021-03-08 NOTE — PROGRESS NOTES
HPI/Subjective:   Patient ID: Rony Esposito is a 69year old female. HPIPatient here for AWV and fu on type 1 DM, breast cancer, and hyperlipdiemia. HPI:   Rony Esposito is a 69year old female who presents for a Medicare Annual Wellness visit. HIVES    Comment:Other reaction(s): PENICILLINS             Other reaction(s): Unknown  Clindamycin               Dust Mite Extract       OTHER (SEE COMMENTS)    Comment:Nasal congestion  Sulfa Antibiotics           CURRENT MEDICATIONS:   Current [facility]patien [facility]outer [facility] 6/21/2012   • Breast cancer, left ([facility] 2009    lumpectomy 01/16/2009   • Breast cancer, left ([facility] 2009    left breast re-excision, left axillary SLNB   • Breast tenderness in female 6/10/2018   • History    Tobacco Use      Smoking status: Never Smoker      Smokeless tobacco: Never Used    Vaping Use      Vaping Use: Never used    Alcohol use:  Yes      Alcohol/week: 1.0 - 2.0 standard drinks      Types: 1 - 2 Glasses of wine per week      Comment: RRR without murmur  GI: good BS's, no masses, HSM or tenderness  : deferred  RECTAL: deferred  MUSCULOSKELETAL: back is not tender, FROM of the back  EXTREMITIES: no cyanosis, clubbing or edema.   Diabetic Foot Exam:  No skin breakdown, acute deformity, h this patient. Update Health Maintenance if applicable    Pap  Every two years There are no preventive care reminders to display for this patient.  Update Health Maintenance if applicable    Chlamydia  Annually if high risk No results found for: CHLAMYDIA No flowsheet data found.       SCREENING SCHEDULE - FEMALE      SCREEN COVERAGE SCHEDULE  (If Indicated) LAST DONE   Dexa If at Risk q2 years    Lipids All Patients q5 years LDL Cholesterol (mg/dL)   Date Value   12/23/2020 73      Colonoscopy All Patients q10 D) 25 MCG (1000 UT) Oral Tab Take 25 mcg by mouth daily. • ezetimibe 10 MG Oral Tab Take 10 mg by mouth.      • Insulin Infusion Pump Supplies (INSULIN PUMP SYRINGE RESERVOIR) Does not apply Misc as directed     • Levothyroxine Sodium 125 MCG Oral Tab

## 2021-03-19 DIAGNOSIS — E78.00 HYPERCHOLESTEREMIA: ICD-10-CM

## 2021-03-23 ENCOUNTER — LAB ENCOUNTER (OUTPATIENT)
Dept: LAB | Facility: HOSPITAL | Age: 71
End: 2021-03-23
Attending: INTERNAL MEDICINE
Payer: MEDICARE

## 2021-03-23 DIAGNOSIS — E03.9 HYPOTHYROIDISM: Primary | ICD-10-CM

## 2021-03-23 DIAGNOSIS — E10.9 TYPE 1 DIABETES MELLITUS (HCC): ICD-10-CM

## 2021-03-23 LAB
ALBUMIN SERPL-MCNC: 3.3 G/DL (ref 3.4–5)
ALBUMIN/GLOB SERPL: 0.9 {RATIO} (ref 1–2)
ALP LIVER SERPL-CCNC: 127 U/L
ALT SERPL-CCNC: 30 U/L
ANION GAP SERPL CALC-SCNC: 2 MMOL/L (ref 0–18)
AST SERPL-CCNC: 19 U/L (ref 15–37)
BILIRUB SERPL-MCNC: 0.5 MG/DL (ref 0.1–2)
BUN BLD-MCNC: 28 MG/DL (ref 7–18)
BUN/CREAT SERPL: 29.8 (ref 10–20)
CALCIUM BLD-MCNC: 9.1 MG/DL (ref 8.5–10.1)
CHLORIDE SERPL-SCNC: 102 MMOL/L (ref 98–112)
CHOLEST SERPL-MCNC: 173 MG/DL
CHOLEST SMN-MCNC: 173 MG/DL (ref ?–200)
CO2 SERPL-SCNC: 34 MMOL/L (ref 21–32)
CREAT BLD-MCNC: 0.94 MG/DL
EST. AVERAGE GLUCOSE BLD GHB EST-MCNC: 212 MG/DL (ref 68–126)
GLOBULIN PLAS-MCNC: 3.6 G/DL (ref 2.8–4.4)
GLUCOSE BLD-MCNC: 142 MG/DL (ref 70–99)
HBA1C MFR BLD HPLC: 9 % (ref ?–5.7)
HDLC SERPL-MCNC: 71 MG/DL
HDLC SERPL-MCNC: 71 MG/DL (ref 40–59)
LDLC SERPL CALC-MCNC: 89 MG/DL
LDLC SERPL CALC-MCNC: 89 MG/DL (ref ?–100)
LENGTH OF FAST TIME PATIENT: YES H
M PROTEIN MFR SERPL ELPH: 6.9 G/DL (ref 6.4–8.2)
NONHDLC SERPL-MCNC: 102 MG/DL
NONHDLC SERPL-MCNC: 102 MG/DL (ref ?–130)
OSMOLALITY SERPL CALC.SUM OF ELEC: 294 MOSM/KG (ref 275–295)
PATIENT FASTING Y/N/NP: YES
PATIENT FASTING Y/N/NP: YES
POTASSIUM SERPL-SCNC: 3.6 MMOL/L (ref 3.5–5.1)
SODIUM SERPL-SCNC: 138 MMOL/L (ref 136–145)
T4 FREE SERPL-MCNC: 1.3 NG/DL (ref 0.8–1.7)
TRIGL SERPL-MCNC: 64 MG/DL
TRIGL SERPL-MCNC: 64 MG/DL (ref 30–149)
TSI SER-ACNC: 0.56 MIU/ML (ref 0.36–3.74)
VLDLC SERPL CALC-MCNC: 13 MG/DL
VLDLC SERPL CALC-MCNC: 13 MG/DL (ref 0–30)

## 2021-03-23 PROCEDURE — 82306 VITAMIN D 25 HYDROXY: CPT

## 2021-03-23 PROCEDURE — 36415 COLL VENOUS BLD VENIPUNCTURE: CPT

## 2021-03-23 PROCEDURE — 80053 COMPREHEN METABOLIC PANEL: CPT

## 2021-03-23 PROCEDURE — 84443 ASSAY THYROID STIM HORMONE: CPT

## 2021-03-23 PROCEDURE — 84439 ASSAY OF FREE THYROXINE: CPT

## 2021-03-23 PROCEDURE — 80061 LIPID PANEL: CPT

## 2021-03-23 PROCEDURE — 83036 HEMOGLOBIN GLYCOSYLATED A1C: CPT

## 2021-03-23 RX ORDER — EZETIMIBE 10 MG/1
TABLET ORAL
Qty: 90 TABLET | Refills: 0 | Status: SHIPPED | OUTPATIENT
Start: 2021-03-23 | End: 2021-06-24

## 2021-03-23 RX ORDER — SIMVASTATIN 20 MG
TABLET ORAL
Qty: 90 TABLET | Refills: 0 | Status: SHIPPED | OUTPATIENT
Start: 2021-03-23 | End: 2021-06-24

## 2021-03-24 LAB — 25(OH)D3 SERPL-MCNC: 45.1 NG/ML (ref 30–100)

## 2021-04-06 ENCOUNTER — HOSPITAL ENCOUNTER (EMERGENCY)
Facility: HOSPITAL | Age: 71
Discharge: HOME | End: 2021-04-06
Attending: EMERGENCY MEDICINE
Payer: MEDICARE

## 2021-04-06 ENCOUNTER — APPOINTMENT (OUTPATIENT)
Dept: GENERAL RADIOLOGY | Facility: HOSPITAL | Age: 71
End: 2021-04-06
Attending: EMERGENCY MEDICINE
Payer: MEDICARE

## 2021-04-06 VITALS
HEART RATE: 77 BPM | RESPIRATION RATE: 18 BRPM | HEIGHT: 62 IN | OXYGEN SATURATION: 96 % | BODY MASS INDEX: 33.13 KG/M2 | DIASTOLIC BLOOD PRESSURE: 66 MMHG | TEMPERATURE: 99 F | WEIGHT: 180 LBS | SYSTOLIC BLOOD PRESSURE: 126 MMHG
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
## 2021-04-06 DIAGNOSIS — S63.502A SPRAIN OF LEFT WRIST, INITIAL ENCOUNTER: Primary | ICD-10-CM

## 2021-04-06 DIAGNOSIS — M79.652 PAIN OF LEFT THIGH: ICD-10-CM

## 2021-04-06 PROCEDURE — 99283 EMERGENCY DEPT VISIT LOW MDM: CPT

## 2021-04-06 PROCEDURE — 73110 X-RAY EXAM OF WRIST: CPT | Performed by: EMERGENCY MEDICINE

## 2021-04-06 RX ORDER — CYCLOBENZAPRINE HCL 10 MG
10 TABLET ORAL 3 TIMES DAILY PRN
Qty: 14 TABLET | Refills: 0 | Status: SHIPPED | OUTPATIENT
Start: 2021-04-06

## 2021-04-06 RX ORDER — IBUPROFEN 600 MG/1
600 TABLET ORAL ONCE
Status: COMPLETED | OUTPATIENT
Start: 2021-04-06 | End: 2021-04-06

## 2021-04-06 NOTE — ED INITIAL ASSESSMENT (HPI)
Fall yesterday. States bleeding to lip then. Complains of left leg and wrist pain. Having difficulty ambulating now. No loc.

## 2021-04-06 NOTE — ED PROVIDER NOTES
Patient Seen in: [facility] Emergency Department    History   Patient presents with:  Fall      HPI    50-year-old female presents the ER status post fall. Patient states she had a mechanical fall yesterday.   Patient states she woke up today is hav reviewed.    Past Surgical History:   Procedure Laterality Date   • BREAST BIOPSY  1997   • CATARACT EXTRACTION Left 02/06/2018    Dr. Merline Setters   • CATARACT EXTRACTION W/  INTRAOCULAR LENS IMPLANT Right 09/2019    Dr. Merline Setters   • CHEMOTHERAPY     • CHOLECYSTECTOMY Mandaeism Services:       Active Member of Clubs or Organizations:       Attends Club or Organization Meetings:       Marital Status:   Intimate Partner Violence:       Fear of Current or Ex-Partner:       Emotionally Abused:       Physically Abused:       Tenderness present. Normal range of motion. Cervical back: Normal range of motion and neck supple.       Comments: 4-5 strength on flexion extension and  of the left hand, no bony tenderness to the upper or lower extremities bilaterally,   Skin: reports. Complicating Factors: The patient already has does not have any pertinent problems on file. to contribute to the complexity of this ED evaluation. ED Course: 25-year-old female presents the ER status post fall.   Patient's wrist x-ray shows n

## 2021-04-08 RX ORDER — METOPROLOL SUCCINATE 25 MG/1
TABLET, EXTENDED RELEASE ORAL
Qty: 30 TABLET | Refills: 0 | Status: SHIPPED | OUTPATIENT
Start: 2021-04-08 | End: 2021-06-15

## 2021-04-13 ENCOUNTER — OFFICE VISIT (OUTPATIENT)
Dept: FAMILY MEDICINE CLINIC | Facility: CLINIC | Age: 71
End: 2021-04-13
Payer: MEDICARE

## 2021-04-13 VITALS
HEART RATE: 72 BPM | TEMPERATURE: 99 F | HEIGHT: 62 IN | OXYGEN SATURATION: 98 % | SYSTOLIC BLOOD PRESSURE: 119 MMHG | WEIGHT: 183 LBS | DIASTOLIC BLOOD PRESSURE: 63 MMHG | BODY MASS INDEX: 33.68 KG/M2 | RESPIRATION RATE: 18 BRPM

## 2021-04-13 DIAGNOSIS — R09.82 POST-NASAL DRIP: ICD-10-CM

## 2021-04-13 DIAGNOSIS — J02.9 SORE THROAT: Primary | ICD-10-CM

## 2021-04-13 PROCEDURE — 87081 CULTURE SCREEN ONLY: CPT | Performed by: PHYSICIAN ASSISTANT

## 2021-04-13 PROCEDURE — 87880 STREP A ASSAY W/OPTIC: CPT | Performed by: PHYSICIAN ASSISTANT

## 2021-04-13 PROCEDURE — 99213 OFFICE O/P EST LOW 20 MIN: CPT | Performed by: PHYSICIAN ASSISTANT

## 2021-04-13 RX ORDER — FLUTICASONE PROPIONATE 50 MCG
SPRAY, SUSPENSION (ML) NASAL
Qty: 1 BOTTLE | Refills: 1 | Status: SHIPPED | OUTPATIENT
Start: 2021-04-13

## 2021-04-13 NOTE — PROGRESS NOTES
CHIEF COMPLAINT:   Patient presents with:  Sore Throat: very sore, sx yesterday, painful swallowing      HPI:   Reed Vernon is a 66year old female with extensive medical history T1DM, HTN, hyperlipidemia who presents with sore throat since yesterday ( UT) Oral Tab Take 25 mcg by mouth daily. • ezetimibe 10 MG Oral Tab Take 10 mg by mouth.      • Insulin Infusion Pump Supplies (INSULIN PUMP SYRINGE RESERVOIR) Does not apply Misc as directed     • metroNIDAZOLE (METROGEL) 1 % External Gel daily (Amador aromatase inhibitor therapy 6/10/2018   • Essential hypertension    • GERD (gastroesophageal reflux disease) 2012   • H/O  section     x2   • H/O vitrectomy     LEFT vitrectomy   • Hip bursitis     left   • Hyperlipidemia 2012   • Hyper suspicious lesions  HEAD: atraumatic, normocephalic.    EYES: conjunctiva clear  EARS: TM's non injected, no bulging, noretraction,no fluid, bony landmarks visble  NOSE: Nostrils patent, thin, clear nasal discharge, nasal mucosa pale, + deviate septum   THR quarantine recommendations reviewed. Discussed s/s of worsening infection/condition with Patient and importance of prompt medical re-evaluation including when to seek emergency care.      Patient voiced understanding    Comfort care as described in thinners.   · For sore throats caused by allergies, try antihistamines to block the allergic reaction. Unless a sore throat is caused by a bacterial infection, antibiotics won’t help you.   Prevent future sore throats  Prevention tips include:  · Stop smok

## 2021-04-14 RX ORDER — FUROSEMIDE 40 MG/1
TABLET ORAL
Qty: 90 TABLET | Refills: 0 | Status: SHIPPED | OUTPATIENT
Start: 2021-04-14 | End: 2021-07-16

## 2021-06-15 RX ORDER — METOPROLOL SUCCINATE 25 MG/1
TABLET, EXTENDED RELEASE ORAL
Qty: 30 TABLET | Refills: 0 | Status: SHIPPED | OUTPATIENT
Start: 2021-06-15

## 2021-06-17 ENCOUNTER — LAB ENCOUNTER (OUTPATIENT)
Dept: LAB | Facility: HOSPITAL | Age: 71
End: 2021-06-17
Attending: INTERNAL MEDICINE
Payer: MEDICARE

## 2021-06-17 DIAGNOSIS — E10.9 DIABETES MELLITUS TYPE I (HCC): Primary | ICD-10-CM

## 2021-06-17 DIAGNOSIS — E03.9 HYPOTHYROIDISM (ACQUIRED): ICD-10-CM

## 2021-06-17 PROCEDURE — 83036 HEMOGLOBIN GLYCOSYLATED A1C: CPT

## 2021-06-17 PROCEDURE — 84443 ASSAY THYROID STIM HORMONE: CPT

## 2021-06-17 PROCEDURE — 80061 LIPID PANEL: CPT

## 2021-06-17 PROCEDURE — 84439 ASSAY OF FREE THYROXINE: CPT

## 2021-06-17 PROCEDURE — 80053 COMPREHEN METABOLIC PANEL: CPT

## 2021-06-17 PROCEDURE — 36415 COLL VENOUS BLD VENIPUNCTURE: CPT

## 2021-06-21 DIAGNOSIS — E78.00 HYPERCHOLESTEREMIA: ICD-10-CM

## 2021-06-24 RX ORDER — SIMVASTATIN 20 MG
TABLET ORAL
Qty: 90 TABLET | Refills: 0 | Status: SHIPPED | OUTPATIENT
Start: 2021-06-24

## 2021-06-24 RX ORDER — EZETIMIBE 10 MG/1
TABLET ORAL
Qty: 90 TABLET | Refills: 0 | Status: SHIPPED | OUTPATIENT
Start: 2021-06-24

## 2021-07-16 RX ORDER — FUROSEMIDE 40 MG/1
TABLET ORAL
Qty: 90 TABLET | Refills: 0 | Status: SHIPPED | OUTPATIENT
Start: 2021-07-16 | End: 2021-09-07

## 2021-08-04 ENCOUNTER — HOSPITAL ENCOUNTER (OUTPATIENT)
Dept: BONE DENSITY | Facility: HOSPITAL | Age: 71
Discharge: HOME | End: 2021-08-04
Attending: INTERNAL MEDICINE
Payer: MEDICARE

## 2021-08-04 DIAGNOSIS — Z51.81 ENCOUNTER FOR MONITORING AROMATASE INHIBITOR THERAPY: ICD-10-CM

## 2021-08-04 DIAGNOSIS — Z79.811 ENCOUNTER FOR MONITORING AROMATASE INHIBITOR THERAPY: ICD-10-CM

## 2021-08-04 PROCEDURE — 77080 DXA BONE DENSITY AXIAL: CPT | Performed by: INTERNAL MEDICINE

## 2021-09-07 ENCOUNTER — OFFICE VISIT (OUTPATIENT)
Dept: INTERNAL MEDICINE CLINIC | Facility: CLINIC | Age: 71
End: 2021-09-07
Payer: MEDICARE

## 2021-09-07 VITALS
WEIGHT: 187.63 LBS | TEMPERATURE: 98 F | SYSTOLIC BLOOD PRESSURE: 130 MMHG | DIASTOLIC BLOOD PRESSURE: 82 MMHG | HEIGHT: 62 IN | OXYGEN SATURATION: 98 % | HEART RATE: 75 BPM | BODY MASS INDEX: 34.53 KG/M2

## 2021-09-07 DIAGNOSIS — E10.59 DM TYPE 1 CAUSING VASCULAR DISEASE (HCC): Primary | ICD-10-CM

## 2021-09-07 DIAGNOSIS — E78.2 MIXED HYPERLIPIDEMIA: ICD-10-CM

## 2021-09-07 DIAGNOSIS — I10 ESSENTIAL HYPERTENSION, BENIGN: ICD-10-CM

## 2021-09-07 PROCEDURE — 99214 OFFICE O/P EST MOD 30 MIN: CPT | Performed by: INTERNAL MEDICINE

## 2021-09-07 RX ORDER — FUROSEMIDE 40 MG/1
40 TABLET ORAL DAILY
Qty: 90 TABLET | Refills: 3 | Status: SHIPPED | OUTPATIENT
Start: 2021-09-07

## 2021-09-07 NOTE — PROGRESS NOTES
HPI/Subjective:   Patient ID: Nell Montgomery is a 66year old female. HPI Pt here for a fu on DM type1 Cad hyperlipdiemia and  Has a lot of Gerd. History/Other:   Review of Systems   Constitutional: Negative for fatigue.    Respiratory: Negative for Chew  by mouth. • Benazepril-Hydrochlorothiazide (LOTENSIN HCT) 10-12.5 MG Oral Tab Take 1 tablet by mouth daily. • Omeprazole 40 MG Oral Capsule Delayed Release Take 40 mg by mouth daily.        • Calcipotriene 0.005 % Apply Externally Ointment

## 2021-09-14 ENCOUNTER — TELEPHONE (OUTPATIENT)
Dept: INTERNAL MEDICINE CLINIC | Facility: CLINIC | Age: 71
End: 2021-09-14

## 2021-09-14 NOTE — TELEPHONE ENCOUNTER
Pt is experiencing diarrhea for the past 4-5 days with some stomach pain. She is wondering what she can do.

## 2021-09-28 ENCOUNTER — IMMUNIZATION (OUTPATIENT)
Dept: LAB | Facility: HOSPITAL | Age: 71
End: 2021-09-28
Attending: EMERGENCY MEDICINE
Payer: MEDICARE

## 2021-09-28 DIAGNOSIS — Z23 NEED FOR VACCINATION: Primary | ICD-10-CM

## 2021-09-28 PROCEDURE — 0003A SARSCOV2 VAC 30MCG/0.3ML IM: CPT

## 2021-09-29 ENCOUNTER — LAB ENCOUNTER (OUTPATIENT)
Dept: LAB | Facility: HOSPITAL | Age: 71
End: 2021-09-29
Attending: INTERNAL MEDICINE
Payer: MEDICARE

## 2021-09-29 DIAGNOSIS — E10.9 DIABETES MELLITUS TYPE I (HCC): Primary | ICD-10-CM

## 2021-09-29 DIAGNOSIS — E03.9 MYXEDEMA HEART DISEASE: ICD-10-CM

## 2021-09-29 DIAGNOSIS — I51.9 MYXEDEMA HEART DISEASE: ICD-10-CM

## 2021-09-29 PROCEDURE — 36415 COLL VENOUS BLD VENIPUNCTURE: CPT

## 2021-09-29 PROCEDURE — 83036 HEMOGLOBIN GLYCOSYLATED A1C: CPT

## 2021-09-29 PROCEDURE — 84439 ASSAY OF FREE THYROXINE: CPT

## 2021-09-29 PROCEDURE — 84443 ASSAY THYROID STIM HORMONE: CPT

## 2021-09-29 PROCEDURE — 80061 LIPID PANEL: CPT

## 2021-09-29 PROCEDURE — 80053 COMPREHEN METABOLIC PANEL: CPT

## 2021-09-30 ENCOUNTER — OFFICE VISIT (OUTPATIENT)
Dept: HEMATOLOGY/ONCOLOGY | Facility: HOSPITAL | Age: 71
End: 2021-09-30
Attending: INTERNAL MEDICINE
Payer: MEDICARE

## 2021-09-30 VITALS
RESPIRATION RATE: 18 BRPM | WEIGHT: 184 LBS | HEIGHT: 62.25 IN | DIASTOLIC BLOOD PRESSURE: 53 MMHG | TEMPERATURE: 98 F | OXYGEN SATURATION: 95 % | SYSTOLIC BLOOD PRESSURE: 135 MMHG | BODY MASS INDEX: 33.43 KG/M2 | HEART RATE: 112 BPM

## 2021-09-30 DIAGNOSIS — Z12.31 ENCOUNTER FOR SCREENING MAMMOGRAM FOR MALIGNANT NEOPLASM OF BREAST: ICD-10-CM

## 2021-09-30 DIAGNOSIS — M85.88 OSTEOPENIA OF LUMBAR SPINE: ICD-10-CM

## 2021-09-30 DIAGNOSIS — C50.412 MALIGNANT NEOPLASM OF UPPER-OUTER QUADRANT OF LEFT BREAST IN FEMALE, ESTROGEN RECEPTOR POSITIVE (HCC): Primary | ICD-10-CM

## 2021-09-30 DIAGNOSIS — E10.65 TYPE 1 DIABETES MELLITUS WITH HYPERGLYCEMIA (HCC): ICD-10-CM

## 2021-09-30 DIAGNOSIS — Z17.0 MALIGNANT NEOPLASM OF UPPER-OUTER QUADRANT OF LEFT BREAST IN FEMALE, ESTROGEN RECEPTOR POSITIVE (HCC): Primary | ICD-10-CM

## 2021-09-30 DIAGNOSIS — Z51.81 ENCOUNTER FOR MONITORING AROMATASE INHIBITOR THERAPY: ICD-10-CM

## 2021-09-30 DIAGNOSIS — Z79.811 ENCOUNTER FOR MONITORING AROMATASE INHIBITOR THERAPY: ICD-10-CM

## 2021-09-30 PROCEDURE — 99214 OFFICE O/P EST MOD 30 MIN: CPT | Performed by: INTERNAL MEDICINE

## 2021-09-30 NOTE — PROGRESS NOTES
HPI   9/30/2021  Cinthia Menon is a 66year old female with ER 87% MA 34% Ki-67 48% Her 2 margaux negative, infiltrating ductal  [id number] left breast cancer diagnosed in 2009. Patient is seen today without complaints of left breast discomfort.    She is con axillary sentinel lymph node biopsy. pT2 N0 Mx.  Stage IIA     3/2009 -  Chemotherapy    CMF x 6 cycles     5/18/2009 -  Hormone Therapy    Anastrozole     7/6/2009 - 8/21/2009 Radiation    5040 cGy left breast; 1600 cGy boost to lumpectomy cavity     10/7/ not bruise/bleed easily. Psychiatric/Behavioral: Negative for agitation, decreased concentration and sleep disturbance. The patient is not nervous/anxious.          Sleeps 8 - 8.5 hours a night          Current Outpatient Medications   Medication Sig Disp 0.005 % Apply Externally Ointment   5     Allergies:     Penicillins             HIVES    Comment:Other reaction(s): PENICILLINS             Other reaction(s): Unknown  Clindamycin               Dust Mite Extract       OTHER (SEE COMMENTS)    Comment:Nasal INTRAOCULAR LENS IMPLANT Right 09/2019    Dr. Yana Lopez   • CHEMOTHERAPY     • CHOLECYSTECTOMY     • LUMPECTOMY LEFT Left 01/16/2009   • NEEDLE BIOPSY RIGHT     • NEEDLE CORE BIOPSY, BREAST ([facility]) Right 10/07/2009    RIGHT breast nodules; mammatome core needle bi file  Stress:       Feeling of Stress : Not on file  Social Connections:       Frequency of Communication with Friends and Family: Not on file      Frequency of Social Gatherings with Friends and Family: Not on file      Attends Restorationist Services: Not on is normal. No respiratory distress. Breath sounds: Normal breath sounds.       Comments: Left breast surgical scar and smaller than right, no palpable lesions in either breast   Abdominal:      General: Bowel sounds are normal.      Palpations: Abdomen for major osteoporotic fracture was 9.4% and hip fracture 1.3%. Patient will continue calcium and vitamin D supplements. Patient is advised to have a follow-up vitamin D 25-hydroxy levels.   The repeat bone density 8/4/2021 revealed normal bone density in 3. 5 3.7   A/G Ratio      1.0 - 2.0  0.9 (L) 0.9 (L)   Patient Fasting?        Yes Yes Yes   Cholesterol, Total      <200 mg/dL 118     HDL Cholesterol      40 - 59 mg/dL 59     Triglycerides      30 - 149 mg/dL 84     LDL Cholesterol Calc      <100 mg/dL 4 baseline the left hip has gone from 0.938 to 0.862 a  loss of  8.0 %  and the AP lumbar has gone from 0.960 to 0.921 a loss of 4.1 %.    10 year Fracture Risk:      Major Osteoporotic Fracture:  9.4 %.    Hip Fracture:  1.3 %.    ===========================

## 2021-10-03 ENCOUNTER — TELEPHONE (OUTPATIENT)
Dept: HEMATOLOGY/ONCOLOGY | Facility: HOSPITAL | Age: 71
End: 2021-10-03

## 2021-10-03 PROBLEM — E10.65 TYPE 1 DIABETES MELLITUS WITH HYPERGLYCEMIA (HCC): Status: ACTIVE | Noted: 2021-10-03

## 2021-10-03 NOTE — TELEPHONE ENCOUNTER
Called patient and discussed her bone density in light of her falls and after reviewing her MRI scan and ER visits  Patient is willing to trial alendronate but will call if she has any discomfort  She does have a history of GERD and is continuing taking da

## 2021-12-03 ENCOUNTER — HOSPITAL ENCOUNTER (OUTPATIENT)
Dept: MAMMOGRAPHY | Facility: HOSPITAL | Age: 71
Discharge: HOME | End: 2021-12-03
Attending: INTERNAL MEDICINE
Payer: MEDICARE

## 2021-12-03 DIAGNOSIS — Z51.81 ENCOUNTER FOR MONITORING AROMATASE INHIBITOR THERAPY: ICD-10-CM

## 2021-12-03 DIAGNOSIS — Z12.31 ENCOUNTER FOR SCREENING MAMMOGRAM FOR MALIGNANT NEOPLASM OF BREAST: ICD-10-CM

## 2021-12-03 DIAGNOSIS — C50.412 MALIGNANT NEOPLASM OF UPPER-OUTER QUADRANT OF LEFT BREAST IN FEMALE, ESTROGEN RECEPTOR POSITIVE (HCC): ICD-10-CM

## 2021-12-03 DIAGNOSIS — Z79.811 ENCOUNTER FOR MONITORING AROMATASE INHIBITOR THERAPY: ICD-10-CM

## 2021-12-03 DIAGNOSIS — Z17.0 MALIGNANT NEOPLASM OF UPPER-OUTER QUADRANT OF LEFT BREAST IN FEMALE, ESTROGEN RECEPTOR POSITIVE (HCC): ICD-10-CM

## 2021-12-03 PROCEDURE — 77063 BREAST TOMOSYNTHESIS BI: CPT | Performed by: INTERNAL MEDICINE

## 2021-12-03 PROCEDURE — 77067 SCR MAMMO BI INCL CAD: CPT | Performed by: INTERNAL MEDICINE

## 2021-12-05 ENCOUNTER — TELEPHONE (OUTPATIENT)
Dept: HEMATOLOGY/ONCOLOGY | Facility: HOSPITAL | Age: 71
End: 2021-12-05

## 2021-12-27 RX ORDER — ALENDRONATE SODIUM 70 MG/1
70 TABLET ORAL WEEKLY
Qty: 12 TABLET | Refills: 0 | Status: SHIPPED | OUTPATIENT
Start: 2021-12-27 | End: 2022-09-29

## 2022-01-03 ENCOUNTER — LAB ENCOUNTER (OUTPATIENT)
Dept: LAB | Facility: HOSPITAL | Age: 72
End: 2022-01-03
Attending: INTERNAL MEDICINE
Payer: MEDICARE

## 2022-01-03 DIAGNOSIS — E03.9 MYXEDEMA HEART DISEASE: ICD-10-CM

## 2022-01-03 DIAGNOSIS — E10.9 DIABETES MELLITUS TYPE I (HCC): Primary | ICD-10-CM

## 2022-01-03 DIAGNOSIS — E03.9 ACQUIRED HYPOTHYROIDISM: ICD-10-CM

## 2022-01-03 DIAGNOSIS — I51.9 MYXEDEMA HEART DISEASE: ICD-10-CM

## 2022-01-03 LAB
ALBUMIN SERPL-MCNC: 3.3 G/DL (ref 3.4–5)
ALBUMIN/GLOB SERPL: 1.1 {RATIO} (ref 1–2)
ALP LIVER SERPL-CCNC: 104 U/L
ALT SERPL-CCNC: 29 U/L
ANION GAP SERPL CALC-SCNC: 5 MMOL/L (ref 0–18)
AST SERPL-CCNC: 22 U/L (ref 15–37)
BILIRUB SERPL-MCNC: 0.6 MG/DL (ref 0.1–2)
BUN BLD-MCNC: 32 MG/DL (ref 7–18)
BUN/CREAT SERPL: 32 (ref 10–20)
CALCIUM BLD-MCNC: 8.9 MG/DL (ref 8.5–10.1)
CHLORIDE SERPL-SCNC: 97 MMOL/L (ref 98–112)
CHOLEST SERPL-MCNC: 125 MG/DL (ref ?–200)
CO2 SERPL-SCNC: 32 MMOL/L (ref 21–32)
CREAT BLD-MCNC: 1 MG/DL
EST. AVERAGE GLUCOSE BLD GHB EST-MCNC: 209 MG/DL (ref 68–126)
FASTING PATIENT LIPID ANSWER: YES
FASTING STATUS PATIENT QL REPORTED: YES
GLOBULIN PLAS-MCNC: 2.9 G/DL (ref 2.8–4.4)
GLUCOSE BLD-MCNC: 383 MG/DL (ref 70–99)
HBA1C MFR BLD: 8.9 % (ref ?–5.7)
HDLC SERPL-MCNC: 64 MG/DL (ref 40–59)
LDLC SERPL CALC-MCNC: 48 MG/DL (ref ?–100)
NONHDLC SERPL-MCNC: 61 MG/DL (ref ?–130)
OSMOLALITY SERPL CALC.SUM OF ELEC: 301 MOSM/KG (ref 275–295)
POTASSIUM SERPL-SCNC: 4.5 MMOL/L (ref 3.5–5.1)
PROT SERPL-MCNC: 6.2 G/DL (ref 6.4–8.2)
SODIUM SERPL-SCNC: 134 MMOL/L (ref 136–145)
T3FREE SERPL-MCNC: 2.51 PG/ML (ref 2.4–4.2)
T4 FREE SERPL-MCNC: 1.9 NG/DL (ref 0.8–1.7)
TRIGL SERPL-MCNC: 59 MG/DL (ref 30–149)
TSI SER-ACNC: 0.99 MIU/ML (ref 0.36–3.74)
VLDLC SERPL CALC-MCNC: 8 MG/DL (ref 0–30)

## 2022-01-03 PROCEDURE — 84481 FREE ASSAY (FT-3): CPT

## 2022-01-03 PROCEDURE — 83036 HEMOGLOBIN GLYCOSYLATED A1C: CPT

## 2022-01-03 PROCEDURE — 80053 COMPREHEN METABOLIC PANEL: CPT

## 2022-01-03 PROCEDURE — 80061 LIPID PANEL: CPT

## 2022-01-03 PROCEDURE — 84443 ASSAY THYROID STIM HORMONE: CPT

## 2022-01-03 PROCEDURE — 36415 COLL VENOUS BLD VENIPUNCTURE: CPT

## 2022-01-03 PROCEDURE — 84439 ASSAY OF FREE THYROXINE: CPT

## 2022-03-15 ENCOUNTER — OFFICE VISIT (OUTPATIENT)
Dept: INTERNAL MEDICINE CLINIC | Facility: CLINIC | Age: 72
End: 2022-03-15
Payer: MEDICARE

## 2022-03-15 VITALS
HEART RATE: 75 BPM | OXYGEN SATURATION: 98 % | DIASTOLIC BLOOD PRESSURE: 80 MMHG | WEIGHT: 188 LBS | SYSTOLIC BLOOD PRESSURE: 130 MMHG | BODY MASS INDEX: 34.16 KG/M2 | HEIGHT: 62.25 IN

## 2022-03-15 DIAGNOSIS — R30.0 DYSURIA: ICD-10-CM

## 2022-03-15 DIAGNOSIS — E10.65 TYPE 1 DIABETES MELLITUS WITH HYPERGLYCEMIA (HCC): ICD-10-CM

## 2022-03-15 DIAGNOSIS — G47.9 PRIMARY SLEEP DISTURBANCE: ICD-10-CM

## 2022-03-15 DIAGNOSIS — R20.0 NUMBNESS AND TINGLING IN BOTH HANDS: Primary | ICD-10-CM

## 2022-03-15 DIAGNOSIS — C50.412 MALIGNANT NEOPLASM OF UPPER-OUTER QUADRANT OF LEFT BREAST IN FEMALE, ESTROGEN RECEPTOR POSITIVE (HCC): ICD-10-CM

## 2022-03-15 DIAGNOSIS — Z23 NEEDS FLU SHOT: ICD-10-CM

## 2022-03-15 DIAGNOSIS — R20.2 NUMBNESS AND TINGLING IN BOTH HANDS: Primary | ICD-10-CM

## 2022-03-15 DIAGNOSIS — Z17.0 MALIGNANT NEOPLASM OF UPPER-OUTER QUADRANT OF LEFT BREAST IN FEMALE, ESTROGEN RECEPTOR POSITIVE (HCC): ICD-10-CM

## 2022-03-15 LAB
APPEARANCE: CLEAR
BILIRUBIN: NEGATIVE
GLUCOSE (URINE DIPSTICK): NEGATIVE MG/DL
KETONES (URINE DIPSTICK): NEGATIVE MG/DL
MULTISTIX LOT#: ABNORMAL NUMERIC
NITRITE, URINE: NEGATIVE
PH, URINE: 5.5 (ref 4.5–8)
PROTEIN (URINE DIPSTICK): NEGATIVE MG/DL
SPECIFIC GRAVITY: 1015 (ref 1–1.03)
URINE-COLOR: YELLOW
UROBILINOGEN,SEMI-QN: 0.2 MG/DL (ref 0–1.9)

## 2022-03-15 PROCEDURE — 87077 CULTURE AEROBIC IDENTIFY: CPT | Performed by: INTERNAL MEDICINE

## 2022-03-15 PROCEDURE — G0008 ADMIN INFLUENZA VIRUS VAC: HCPCS | Performed by: INTERNAL MEDICINE

## 2022-03-15 PROCEDURE — 99214 OFFICE O/P EST MOD 30 MIN: CPT | Performed by: INTERNAL MEDICINE

## 2022-03-15 PROCEDURE — 81003 URINALYSIS AUTO W/O SCOPE: CPT | Performed by: INTERNAL MEDICINE

## 2022-03-15 PROCEDURE — 87086 URINE CULTURE/COLONY COUNT: CPT | Performed by: INTERNAL MEDICINE

## 2022-03-15 PROCEDURE — 87186 SC STD MICRODIL/AGAR DIL: CPT | Performed by: INTERNAL MEDICINE

## 2022-03-15 PROCEDURE — 90662 IIV NO PRSV INCREASED AG IM: CPT | Performed by: INTERNAL MEDICINE

## 2022-03-15 RX ORDER — CALCIPOTRIENE 0.05 MG/ML
SOLUTION TOPICAL
COMMUNITY
Start: 2021-12-08

## 2022-03-15 RX ORDER — GABAPENTIN 300 MG/1
300 CAPSULE ORAL NIGHTLY
COMMUNITY
Start: 2022-01-07 | End: 2022-03-15

## 2022-03-18 RX ORDER — ANASTROZOLE 1 MG/1
1 TABLET ORAL DAILY
Qty: 90 TABLET | Refills: 1 | Status: SHIPPED | OUTPATIENT
Start: 2022-03-18 | End: 2022-09-07

## 2022-03-29 ENCOUNTER — LAB ENCOUNTER (OUTPATIENT)
Dept: LAB | Facility: HOSPITAL | Age: 72
End: 2022-03-29
Attending: INTERNAL MEDICINE
Payer: MEDICARE

## 2022-03-29 DIAGNOSIS — E10.9 DIABETES MELLITUS TYPE I (HCC): Primary | ICD-10-CM

## 2022-03-29 DIAGNOSIS — E03.9 ACQUIRED HYPOTHYROIDISM: ICD-10-CM

## 2022-03-29 LAB
ALBUMIN SERPL-MCNC: 3.4 G/DL (ref 3.4–5)
ALBUMIN/GLOB SERPL: 1.2 {RATIO} (ref 1–2)
ALP LIVER SERPL-CCNC: 110 U/L
ALT SERPL-CCNC: 27 U/L
ANION GAP SERPL CALC-SCNC: 4 MMOL/L (ref 0–18)
AST SERPL-CCNC: 23 U/L (ref 15–37)
BILIRUB SERPL-MCNC: 0.5 MG/DL (ref 0.1–2)
BUN BLD-MCNC: 23 MG/DL (ref 7–18)
BUN/CREAT SERPL: 24.2 (ref 10–20)
CALCIUM BLD-MCNC: 9 MG/DL (ref 8.5–10.1)
CHLORIDE SERPL-SCNC: 102 MMOL/L (ref 98–112)
CHOLEST SERPL-MCNC: 156 MG/DL (ref ?–200)
CO2 SERPL-SCNC: 34 MMOL/L (ref 21–32)
CREAT BLD-MCNC: 0.95 MG/DL
CREAT UR-SCNC: 20.7 MG/DL
EST. AVERAGE GLUCOSE BLD GHB EST-MCNC: 206 MG/DL (ref 68–126)
FASTING PATIENT LIPID ANSWER: YES
FASTING STATUS PATIENT QL REPORTED: YES
GLOBULIN PLAS-MCNC: 2.9 G/DL (ref 2.8–4.4)
GLUCOSE BLD-MCNC: 139 MG/DL (ref 70–99)
HBA1C MFR BLD: 8.8 % (ref ?–5.7)
HDLC SERPL-MCNC: 64 MG/DL (ref 40–59)
LDLC SERPL CALC-MCNC: 77 MG/DL (ref ?–100)
MICROALBUMIN UR-MCNC: 0.65 MG/DL
MICROALBUMIN/CREAT 24H UR-RTO: 31.4 UG/MG (ref ?–30)
NONHDLC SERPL-MCNC: 92 MG/DL (ref ?–130)
OSMOLALITY SERPL CALC.SUM OF ELEC: 296 MOSM/KG (ref 275–295)
POTASSIUM SERPL-SCNC: 3.8 MMOL/L (ref 3.5–5.1)
PROT SERPL-MCNC: 6.3 G/DL (ref 6.4–8.2)
SODIUM SERPL-SCNC: 140 MMOL/L (ref 136–145)
T4 FREE SERPL-MCNC: 1.5 NG/DL (ref 0.8–1.7)
TRIGL SERPL-MCNC: 76 MG/DL (ref 30–149)
TSI SER-ACNC: 1.3 MIU/ML (ref 0.36–3.74)
VLDLC SERPL CALC-MCNC: 12 MG/DL (ref 0–30)

## 2022-03-29 PROCEDURE — 84439 ASSAY OF FREE THYROXINE: CPT

## 2022-03-29 PROCEDURE — 36415 COLL VENOUS BLD VENIPUNCTURE: CPT

## 2022-03-29 PROCEDURE — 82043 UR ALBUMIN QUANTITATIVE: CPT

## 2022-03-29 PROCEDURE — 80061 LIPID PANEL: CPT

## 2022-03-29 PROCEDURE — 82570 ASSAY OF URINE CREATININE: CPT

## 2022-03-29 PROCEDURE — 80053 COMPREHEN METABOLIC PANEL: CPT

## 2022-03-29 PROCEDURE — 83036 HEMOGLOBIN GLYCOSYLATED A1C: CPT

## 2022-03-29 PROCEDURE — 84443 ASSAY THYROID STIM HORMONE: CPT

## 2022-04-11 ENCOUNTER — PROCEDURE VISIT (OUTPATIENT)
Dept: NEUROLOGY | Facility: CLINIC | Age: 72
End: 2022-04-11
Payer: MEDICARE

## 2022-04-11 ENCOUNTER — TELEPHONE (OUTPATIENT)
Dept: NEUROLOGY | Facility: CLINIC | Age: 72
End: 2022-04-11

## 2022-04-11 NOTE — PROCEDURES
The patient arrived in the exam room 20 minutes after her appointment start time and asked several questions regarding the test including what the end result of such a study would be and will she need surgery? I told her that since I have no information regarding her case and have not performed the study that I could not answer this question, however some patients do go on to require surgery for various reasons. She then asked if the test was painful. Because this could be a time-consuming test and there is another study scheduled for 11am, I informed her that I have 40 minutes to complete an hour-long test to which she replied \"I just have one more question because no one told me about this test.\" She then stated that she would rather not have the test done.

## 2022-04-11 NOTE — TELEPHONE ENCOUNTER
Pt was 13 minutes late, was seen by Dr. Nate Reeder after explaining how test Is done pt refused and left.

## 2022-04-29 ENCOUNTER — TELEPHONE (OUTPATIENT)
Dept: INTERNAL MEDICINE CLINIC | Facility: CLINIC | Age: 72
End: 2022-04-29

## 2022-04-29 NOTE — TELEPHONE ENCOUNTER
[place] and forgot to pack her insulin syringes/needles and Lantus. Has call into endocrinologist but no one answering. Can PCP order her an emergency script for these? Informed her that we don't have record of her Lantus prescription and not certain PCP will be willing to do so. While tying this message, her endocrinologist was on the other line, so patient declined any further requests and ended this call.

## 2022-05-12 ENCOUNTER — TELEPHONE (OUTPATIENT)
Dept: INTERNAL MEDICINE CLINIC | Facility: CLINIC | Age: 72
End: 2022-05-12

## 2022-05-12 NOTE — TELEPHONE ENCOUNTER
Susie Valadez from [employer] faxed paperwork on 5/9 to Dr. Estephanie Brian. She is checking on the status of the paperwork. Asking if it could please be reviewed, signed by the doctor and faxed to her. Fax number is [phone]. Phone number directly for Susie Valadez is [phone].

## 2022-06-01 ENCOUNTER — OFFICE VISIT (OUTPATIENT)
Dept: SLEEP CENTER | Age: 72
End: 2022-06-01
Attending: INTERNAL MEDICINE
Payer: MEDICARE

## 2022-06-01 DIAGNOSIS — G47.9 PRIMARY SLEEP DISTURBANCE: ICD-10-CM

## 2022-06-01 PROCEDURE — 95806 SLEEP STUDY UNATT&RESP EFFT: CPT

## 2022-06-08 ENCOUNTER — OFFICE VISIT (OUTPATIENT)
Dept: FAMILY MEDICINE CLINIC | Facility: CLINIC | Age: 72
End: 2022-06-08
Payer: MEDICARE

## 2022-06-08 VITALS
SYSTOLIC BLOOD PRESSURE: 134 MMHG | DIASTOLIC BLOOD PRESSURE: 48 MMHG | HEART RATE: 74 BPM | OXYGEN SATURATION: 98 % | TEMPERATURE: 99 F | RESPIRATION RATE: 16 BRPM

## 2022-06-08 DIAGNOSIS — R30.0 DYSURIA: ICD-10-CM

## 2022-06-08 DIAGNOSIS — N89.8 VAGINAL ITCHING: Primary | ICD-10-CM

## 2022-06-08 LAB
APPEARANCE: CLEAR
BILIRUBIN: NEGATIVE
GLUCOSE (URINE DIPSTICK): NEGATIVE MG/DL
KETONES (URINE DIPSTICK): NEGATIVE MG/DL
LEUKOCYTES: NEGATIVE
MULTISTIX LOT#: NORMAL NUMERIC
NITRITE, URINE: NEGATIVE
OCCULT BLOOD: NEGATIVE
PH, URINE: 5 (ref 4.5–8)
PROTEIN (URINE DIPSTICK): NEGATIVE MG/DL
SPECIFIC GRAVITY: 1.02 (ref 1–1.03)
URINE-COLOR: YELLOW
UROBILINOGEN,SEMI-QN: 0.2 MG/DL (ref 0–1.9)

## 2022-06-08 PROCEDURE — 99213 OFFICE O/P EST LOW 20 MIN: CPT | Performed by: NURSE PRACTITIONER

## 2022-06-08 PROCEDURE — 81003 URINALYSIS AUTO W/O SCOPE: CPT | Performed by: NURSE PRACTITIONER

## 2022-06-08 PROCEDURE — 87086 URINE CULTURE/COLONY COUNT: CPT | Performed by: NURSE PRACTITIONER

## 2022-06-08 RX ORDER — FLUCONAZOLE 150 MG/1
150 TABLET ORAL ONCE
Qty: 1 TABLET | Refills: 0 | Status: SHIPPED | OUTPATIENT
Start: 2022-06-08 | End: 2022-06-08

## 2022-06-13 ENCOUNTER — OFFICE VISIT (OUTPATIENT)
Dept: INTERNAL MEDICINE CLINIC | Facility: CLINIC | Age: 72
End: 2022-06-13
Payer: MEDICARE

## 2022-06-13 VITALS
BODY MASS INDEX: 34.16 KG/M2 | WEIGHT: 188 LBS | DIASTOLIC BLOOD PRESSURE: 54 MMHG | HEIGHT: 62.25 IN | HEART RATE: 71 BPM | SYSTOLIC BLOOD PRESSURE: 132 MMHG

## 2022-06-13 DIAGNOSIS — B37.3 VAGINAL YEAST INFECTION: Primary | ICD-10-CM

## 2022-06-13 PROCEDURE — 99213 OFFICE O/P EST LOW 20 MIN: CPT | Performed by: FAMILY MEDICINE

## 2022-06-13 RX ORDER — FLUCONAZOLE 150 MG/1
150 TABLET ORAL ONCE
Qty: 1 TABLET | Refills: 0 | Status: SHIPPED | OUTPATIENT
Start: 2022-06-13 | End: 2022-06-13

## 2022-06-21 ENCOUNTER — OFFICE VISIT (OUTPATIENT)
Dept: SLEEP CENTER | Age: 72
End: 2022-06-21
Attending: INTERNAL MEDICINE
Payer: MEDICARE

## 2022-06-21 DIAGNOSIS — G47.9 PRIMARY SLEEP DISTURBANCE: ICD-10-CM

## 2022-06-21 PROCEDURE — 95806 SLEEP STUDY UNATT&RESP EFFT: CPT

## 2022-06-28 ENCOUNTER — ORDER TRANSCRIPTION (OUTPATIENT)
Dept: SLEEP CENTER | Age: 72
End: 2022-06-28

## 2022-06-28 DIAGNOSIS — G47.33 OBSTRUCTIVE SLEEP APNEA (ADULT) (PEDIATRIC): Primary | ICD-10-CM

## 2022-07-08 ENCOUNTER — LAB ENCOUNTER (OUTPATIENT)
Dept: LAB | Facility: HOSPITAL | Age: 72
End: 2022-07-08
Attending: INTERNAL MEDICINE
Payer: MEDICARE

## 2022-07-08 DIAGNOSIS — E10.9 TYPE 1 DIABETES MELLITUS (HCC): Primary | ICD-10-CM

## 2022-07-08 LAB
ALBUMIN SERPL-MCNC: 3.2 G/DL (ref 3.4–5)
ALBUMIN/GLOB SERPL: 1 {RATIO} (ref 1–2)
ALP LIVER SERPL-CCNC: 106 U/L
ALT SERPL-CCNC: 26 U/L
ANION GAP SERPL CALC-SCNC: 5 MMOL/L (ref 0–18)
AST SERPL-CCNC: 24 U/L (ref 15–37)
BILIRUB SERPL-MCNC: 0.5 MG/DL (ref 0.1–2)
BUN BLD-MCNC: 17 MG/DL (ref 7–18)
BUN/CREAT SERPL: 18.5 (ref 10–20)
CALCIUM BLD-MCNC: 9.2 MG/DL (ref 8.5–10.1)
CHLORIDE SERPL-SCNC: 103 MMOL/L (ref 98–112)
CHOLEST SERPL-MCNC: 144 MG/DL (ref ?–200)
CO2 SERPL-SCNC: 31 MMOL/L (ref 21–32)
CREAT BLD-MCNC: 0.92 MG/DL
EST. AVERAGE GLUCOSE BLD GHB EST-MCNC: 212 MG/DL (ref 68–126)
FASTING PATIENT LIPID ANSWER: YES
FASTING STATUS PATIENT QL REPORTED: YES
GLOBULIN PLAS-MCNC: 3.2 G/DL (ref 2.8–4.4)
GLUCOSE BLD-MCNC: 313 MG/DL (ref 70–99)
HBA1C MFR BLD: 9 % (ref ?–5.7)
HDLC SERPL-MCNC: 62 MG/DL (ref 40–59)
LDLC SERPL CALC-MCNC: 68 MG/DL (ref ?–100)
NONHDLC SERPL-MCNC: 82 MG/DL (ref ?–130)
OSMOLALITY SERPL CALC.SUM OF ELEC: 301 MOSM/KG (ref 275–295)
POTASSIUM SERPL-SCNC: 3.9 MMOL/L (ref 3.5–5.1)
PROT SERPL-MCNC: 6.4 G/DL (ref 6.4–8.2)
SODIUM SERPL-SCNC: 139 MMOL/L (ref 136–145)
TRIGL SERPL-MCNC: 73 MG/DL (ref 30–149)
VLDLC SERPL CALC-MCNC: 11 MG/DL (ref 0–30)

## 2022-07-08 PROCEDURE — 83036 HEMOGLOBIN GLYCOSYLATED A1C: CPT

## 2022-07-08 PROCEDURE — 36415 COLL VENOUS BLD VENIPUNCTURE: CPT

## 2022-07-08 PROCEDURE — 80061 LIPID PANEL: CPT

## 2022-07-08 PROCEDURE — 80053 COMPREHEN METABOLIC PANEL: CPT

## 2022-08-12 ENCOUNTER — HOSPITAL ENCOUNTER (EMERGENCY)
Facility: HOSPITAL | Age: 72
Discharge: HOME | End: 2022-08-12
Payer: MEDICARE

## 2022-08-12 ENCOUNTER — APPOINTMENT (OUTPATIENT)
Dept: ULTRASOUND IMAGING | Facility: HOSPITAL | Age: 72
End: 2022-08-12
Attending: NURSE PRACTITIONER
Payer: MEDICARE

## 2022-08-12 VITALS
TEMPERATURE: 98 F | BODY MASS INDEX: 32.94 KG/M2 | SYSTOLIC BLOOD PRESSURE: 142 MMHG | OXYGEN SATURATION: 97 % | RESPIRATION RATE: 18 BRPM | HEIGHT: 62 IN | DIASTOLIC BLOOD PRESSURE: 73 MMHG | HEART RATE: 77 BPM | WEIGHT: 179 LBS

## 2022-08-12 DIAGNOSIS — M79.605 PAIN OF LEFT LOWER EXTREMITY: Primary | ICD-10-CM

## 2022-08-12 PROCEDURE — 99284 EMERGENCY DEPT VISIT MOD MDM: CPT

## 2022-08-12 PROCEDURE — 93971 EXTREMITY STUDY: CPT | Performed by: NURSE PRACTITIONER

## 2022-08-12 NOTE — ED INITIAL ASSESSMENT (HPI)
Pt states has hard \"lump\" with redness on lt lower leg since July 26th. . States PT suggested she check it out for DVT. Had recent flight from [facility] July 26th.

## 2022-08-13 ENCOUNTER — LAB ENCOUNTER (OUTPATIENT)
Dept: LAB | Facility: HOSPITAL | Age: 72
End: 2022-08-13
Attending: INTERNAL MEDICINE
Payer: MEDICARE

## 2022-08-13 DIAGNOSIS — G47.33 OBSTRUCTIVE SLEEP APNEA (ADULT) (PEDIATRIC): ICD-10-CM

## 2022-08-13 LAB — SARS-COV-2 RNA RESP QL NAA+PROBE: NOT DETECTED

## 2022-08-16 ENCOUNTER — OFFICE VISIT (OUTPATIENT)
Dept: SLEEP CENTER | Age: 72
End: 2022-08-16
Attending: INTERNAL MEDICINE
Payer: MEDICARE

## 2022-08-16 DIAGNOSIS — G47.33 OSA (OBSTRUCTIVE SLEEP APNEA): ICD-10-CM

## 2022-08-16 PROCEDURE — 95811 POLYSOM 6/>YRS CPAP 4/> PARM: CPT

## 2022-09-07 RX ORDER — FUROSEMIDE 40 MG/1
TABLET ORAL
Qty: 90 TABLET | Refills: 0 | Status: SHIPPED | OUTPATIENT
Start: 2022-09-07

## 2022-09-07 RX ORDER — ANASTROZOLE 1 MG/1
1 TABLET ORAL DAILY
Qty: 30 TABLET | Refills: 0 | Status: SHIPPED | OUTPATIENT
Start: 2022-09-07 | End: 2022-09-29

## 2022-09-13 ENCOUNTER — TELEPHONE (OUTPATIENT)
Dept: INTERNAL MEDICINE CLINIC | Facility: CLINIC | Age: 72
End: 2022-09-13

## 2022-09-13 NOTE — TELEPHONE ENCOUNTER
Patient called to check on the status of her referral for a new Cpap machine. She said it has been about a week since the referral was sent to insurance.

## 2022-09-13 NOTE — TELEPHONE ENCOUNTER
Apparently CPAP paperwork was submitted however Joselito was not notified/ referral somehow was marked \"pending\" [address] saw the \"pending\" status and assumed that interna;l review process was underway/ did not act on this referral (Process: someone in their office reviews, forwards the case electronically to [address] [place] [address] it \"pending\" in our referral system). Hence referral was not ever reviewed by Joselito and officially forwarded [facility] team when I called to ck status on 9/13. As of 9/13- referral/ DME order has been forwarded by      Roxane Valle to [facility] and now will be 5-7 business days (~9/22) before [employer] can be expected to [facility] or deny it. Patient informed via [employer] of status of CPAP DME Order-- still pending.

## 2022-09-19 ENCOUNTER — TELEPHONE (OUTPATIENT)
Dept: INTERNAL MEDICINE CLINIC | Facility: CLINIC | Age: 72
End: 2022-09-19

## 2022-09-19 NOTE — TELEPHONE ENCOUNTER
Order for DME finally authorized this morning and faxed to vendor. Redness around neck. Furosemide can cause reaction to sunlight. Per Dermatologist, should either change med or stop taking. ptient going to [place] for one month soon. Please advise.

## 2022-09-19 NOTE — TELEPHONE ENCOUNTER
Patient believes she has an allergic reaction due to medication she is taking (Furosemide) she also mention she needs to get sleep apnea study please call her back.

## 2022-09-29 ENCOUNTER — OFFICE VISIT (OUTPATIENT)
Dept: HEMATOLOGY/ONCOLOGY | Facility: HOSPITAL | Age: 72
End: 2022-09-29
Attending: INTERNAL MEDICINE

## 2022-09-29 VITALS
BODY MASS INDEX: 32.71 KG/M2 | RESPIRATION RATE: 18 BRPM | DIASTOLIC BLOOD PRESSURE: 47 MMHG | HEIGHT: 62.25 IN | OXYGEN SATURATION: 98 % | WEIGHT: 180 LBS | SYSTOLIC BLOOD PRESSURE: 101 MMHG | TEMPERATURE: 98 F | HEART RATE: 75 BPM

## 2022-09-29 DIAGNOSIS — Z85.3 HISTORY OF LEFT BREAST CANCER: Primary | ICD-10-CM

## 2022-09-29 DIAGNOSIS — Z79.811 ENCOUNTER FOR MONITORING AROMATASE INHIBITOR THERAPY: ICD-10-CM

## 2022-09-29 DIAGNOSIS — Z85.3 ENCOUNTER FOR FOLLOW-UP SURVEILLANCE OF BREAST CANCER: ICD-10-CM

## 2022-09-29 DIAGNOSIS — Z51.81 ENCOUNTER FOR MONITORING AROMATASE INHIBITOR THERAPY: ICD-10-CM

## 2022-09-29 DIAGNOSIS — Z08 ENCOUNTER FOR FOLLOW-UP SURVEILLANCE OF BREAST CANCER: ICD-10-CM

## 2022-09-29 DIAGNOSIS — Z12.31 ENCOUNTER FOR SCREENING MAMMOGRAM FOR MALIGNANT NEOPLASM OF BREAST: ICD-10-CM

## 2022-09-29 PROCEDURE — 99214 OFFICE O/P EST MOD 30 MIN: CPT | Performed by: INTERNAL MEDICINE

## 2022-09-30 ENCOUNTER — APPOINTMENT (OUTPATIENT)
Dept: HEMATOLOGY/ONCOLOGY | Facility: HOSPITAL | Age: 72
End: 2022-09-30
Attending: INTERNAL MEDICINE
Payer: MEDICARE

## 2022-10-06 ENCOUNTER — OFFICE VISIT (OUTPATIENT)
Dept: FAMILY MEDICINE CLINIC | Facility: CLINIC | Age: 72
End: 2022-10-06
Payer: MEDICARE

## 2022-10-06 ENCOUNTER — TELEPHONE (OUTPATIENT)
Dept: INTERNAL MEDICINE CLINIC | Facility: CLINIC | Age: 72
End: 2022-10-06

## 2022-10-06 VITALS
BODY MASS INDEX: 33.49 KG/M2 | SYSTOLIC BLOOD PRESSURE: 94 MMHG | HEIGHT: 62 IN | HEART RATE: 69 BPM | TEMPERATURE: 98 F | OXYGEN SATURATION: 97 % | WEIGHT: 182 LBS | DIASTOLIC BLOOD PRESSURE: 73 MMHG | RESPIRATION RATE: 18 BRPM

## 2022-10-06 DIAGNOSIS — R39.9 UTI SYMPTOMS: ICD-10-CM

## 2022-10-06 DIAGNOSIS — N30.00 ACUTE CYSTITIS WITHOUT HEMATURIA: Primary | ICD-10-CM

## 2022-10-06 LAB
BILIRUBIN: NEGATIVE
GLUCOSE (URINE DIPSTICK): >=1000 MG/DL
KETONES (URINE DIPSTICK): NEGATIVE MG/DL
MULTISTIX LOT#: ABNORMAL NUMERIC
NITRITE, URINE: POSITIVE
OCCULT BLOOD: NEGATIVE
PH, URINE: 5 (ref 4.5–8)
PROTEIN (URINE DIPSTICK): NEGATIVE MG/DL
SPECIFIC GRAVITY: 1.02 (ref 1–1.03)
URINE-COLOR: YELLOW
UROBILINOGEN,SEMI-QN: 0.2 MG/DL (ref 0–1.9)

## 2022-10-06 PROCEDURE — 81003 URINALYSIS AUTO W/O SCOPE: CPT | Performed by: PHYSICIAN ASSISTANT

## 2022-10-06 PROCEDURE — 99213 OFFICE O/P EST LOW 20 MIN: CPT | Performed by: PHYSICIAN ASSISTANT

## 2022-10-06 PROCEDURE — 87186 SC STD MICRODIL/AGAR DIL: CPT | Performed by: PHYSICIAN ASSISTANT

## 2022-10-06 PROCEDURE — 87086 URINE CULTURE/COLONY COUNT: CPT | Performed by: PHYSICIAN ASSISTANT

## 2022-10-06 PROCEDURE — 87088 URINE BACTERIA CULTURE: CPT | Performed by: PHYSICIAN ASSISTANT

## 2022-10-06 RX ORDER — NITROFURANTOIN 25; 75 MG/1; MG/1
100 CAPSULE ORAL 2 TIMES DAILY
Qty: 14 CAPSULE | Refills: 0 | Status: SHIPPED | OUTPATIENT
Start: 2022-10-06 | End: 2022-10-13

## 2022-10-06 NOTE — TELEPHONE ENCOUNTER
LVM: needs to see pulmonology for future follow-up on CPAP usage and supplies. Per insurance, needs to be evaluated before January 1, 2023. Referred and authorized to see Kelley Noe.

## 2022-10-08 ENCOUNTER — LAB ENCOUNTER (OUTPATIENT)
Dept: LAB | Facility: HOSPITAL | Age: 72
End: 2022-10-08
Attending: INTERNAL MEDICINE
Payer: MEDICARE

## 2022-10-08 DIAGNOSIS — E10.9 DIABETES MELLITUS TYPE I (HCC): Primary | ICD-10-CM

## 2022-10-08 LAB
ALBUMIN SERPL-MCNC: 3 G/DL (ref 3.4–5)
ALBUMIN/GLOB SERPL: 0.9 {RATIO} (ref 1–2)
ALP LIVER SERPL-CCNC: 130 U/L
ALT SERPL-CCNC: 38 U/L
ANION GAP SERPL CALC-SCNC: 7 MMOL/L (ref 0–18)
AST SERPL-CCNC: 31 U/L (ref 15–37)
BILIRUB SERPL-MCNC: 0.4 MG/DL (ref 0.1–2)
BUN BLD-MCNC: 17 MG/DL (ref 7–18)
BUN/CREAT SERPL: 21.3 (ref 10–20)
CALCIUM BLD-MCNC: 8.5 MG/DL (ref 8.5–10.1)
CHLORIDE SERPL-SCNC: 107 MMOL/L (ref 98–112)
CHOLEST SERPL-MCNC: 150 MG/DL (ref ?–200)
CO2 SERPL-SCNC: 29 MMOL/L (ref 21–32)
CREAT BLD-MCNC: 0.8 MG/DL
EST. AVERAGE GLUCOSE BLD GHB EST-MCNC: 197 MG/DL (ref 68–126)
FASTING PATIENT LIPID ANSWER: YES
FASTING STATUS PATIENT QL REPORTED: YES
GFR SERPLBLD BASED ON 1.73 SQ M-ARVRAT: 78 ML/MIN/1.73M2 (ref 60–?)
GLOBULIN PLAS-MCNC: 3.2 G/DL (ref 2.8–4.4)
GLUCOSE BLD-MCNC: 163 MG/DL (ref 70–99)
HBA1C MFR BLD: 8.5 % (ref ?–5.7)
HDLC SERPL-MCNC: 57 MG/DL (ref 40–59)
LDLC SERPL CALC-MCNC: 75 MG/DL (ref ?–100)
NONHDLC SERPL-MCNC: 93 MG/DL (ref ?–130)
OSMOLALITY SERPL CALC.SUM OF ELEC: 301 MOSM/KG (ref 275–295)
POTASSIUM SERPL-SCNC: 4.2 MMOL/L (ref 3.5–5.1)
PROT SERPL-MCNC: 6.2 G/DL (ref 6.4–8.2)
SODIUM SERPL-SCNC: 143 MMOL/L (ref 136–145)
TRIGL SERPL-MCNC: 96 MG/DL (ref 30–149)
VLDLC SERPL CALC-MCNC: 15 MG/DL (ref 0–30)

## 2022-10-08 PROCEDURE — 80061 LIPID PANEL: CPT

## 2022-10-08 PROCEDURE — 83036 HEMOGLOBIN GLYCOSYLATED A1C: CPT

## 2022-10-08 PROCEDURE — 36415 COLL VENOUS BLD VENIPUNCTURE: CPT

## 2022-10-08 PROCEDURE — 80053 COMPREHEN METABOLIC PANEL: CPT

## 2022-10-10 RX ORDER — ANASTROZOLE 1 MG/1
TABLET ORAL
Qty: 30 TABLET | Refills: 0 | OUTPATIENT
Start: 2022-10-10

## 2022-11-23 ENCOUNTER — HOSPITAL ENCOUNTER (OUTPATIENT)
Age: 72
Discharge: HOME | End: 2022-11-23
Payer: MEDICARE

## 2022-11-23 ENCOUNTER — OFFICE VISIT (OUTPATIENT)
Dept: FAMILY MEDICINE CLINIC | Facility: CLINIC | Age: 72
End: 2022-11-23
Payer: MEDICARE

## 2022-11-23 VITALS
RESPIRATION RATE: 16 BRPM | TEMPERATURE: 98 F | SYSTOLIC BLOOD PRESSURE: 142 MMHG | HEART RATE: 71 BPM | DIASTOLIC BLOOD PRESSURE: 65 MMHG | OXYGEN SATURATION: 100 %

## 2022-11-23 DIAGNOSIS — N30.90 CYSTITIS: Primary | ICD-10-CM

## 2022-11-23 DIAGNOSIS — Z53.21 PATIENT LEFT WITHOUT BEING SEEN: Primary | ICD-10-CM

## 2022-11-23 LAB
BILIRUB UR QL STRIP: NEGATIVE
COLOR UR: YELLOW
GLUCOSE UR STRIP-MCNC: NEGATIVE MG/DL
KETONES UR STRIP-MCNC: NEGATIVE MG/DL
NITRITE UR QL STRIP: NEGATIVE
PH UR STRIP: 5.5 [PH]
PROT UR STRIP-MCNC: NEGATIVE MG/DL
SP GR UR STRIP: 1.01
UROBILINOGEN UR STRIP-ACNC: <2 MG/DL

## 2022-11-23 PROCEDURE — 81002 URINALYSIS NONAUTO W/O SCOPE: CPT | Performed by: NURSE PRACTITIONER

## 2022-11-23 PROCEDURE — 99213 OFFICE O/P EST LOW 20 MIN: CPT | Performed by: NURSE PRACTITIONER

## 2022-11-23 RX ORDER — NITROFURANTOIN 25; 75 MG/1; MG/1
100 CAPSULE ORAL 2 TIMES DAILY
Qty: 14 CAPSULE | Refills: 0 | Status: SHIPPED | OUTPATIENT
Start: 2022-11-23 | End: 2022-11-30

## 2022-11-23 NOTE — DISCHARGE INSTRUCTIONS
Make sure to stay well hydrated with clear fluids. Take the full course of antibiotics as prescribed, even if you begin to feel better. I recommend the use of a probiotic supplement while you are taking, or after completion of your antibiotic supplement. Probiotics can help to reduce the number of GI side/adverse effects seen with antibiotic use. Probiotics are available over the counter. If you plan to use the probiotic concurrently with the antibiotic, use Florastor only. If you plan to use the probiotic after the antibiotic has been completed, you may chose any brand. Follow up with your primary care provider within the next 2 days. Seek additional care in the ER for new or worsening symptoms, fever, abdominal pain, back pain, blood in your urine, or persistent vomiting/diarrhea.

## 2022-11-23 NOTE — ED INITIAL ASSESSMENT (HPI)
Pt presents with urinary frequency, urgency and burning with urination. No itch or discharge. Pt recently given antibiotic for \"tooth issue\" approx 3 weeks ago. No fever.

## 2022-11-28 ENCOUNTER — LAB ENCOUNTER (OUTPATIENT)
Dept: LAB | Facility: HOSPITAL | Age: 72
End: 2022-11-28
Attending: OPHTHALMOLOGY
Payer: MEDICARE

## 2022-11-28 DIAGNOSIS — R52 PAIN: Primary | ICD-10-CM

## 2022-11-28 DIAGNOSIS — M19.90 ARTHRITIS: ICD-10-CM

## 2022-11-28 DIAGNOSIS — R10.0 ACUTE ABDOMINAL PAIN SYNDROME: ICD-10-CM

## 2022-11-28 LAB
CRP SERPL-MCNC: 0.5 MG/DL (ref ?–0.3)
ERYTHROCYTE [SEDIMENTATION RATE] IN BLOOD: 23 MM/HR

## 2022-11-28 PROCEDURE — 86140 C-REACTIVE PROTEIN: CPT

## 2022-11-28 PROCEDURE — 36415 COLL VENOUS BLD VENIPUNCTURE: CPT

## 2022-11-28 PROCEDURE — 85652 RBC SED RATE AUTOMATED: CPT

## 2022-12-05 ENCOUNTER — HOSPITAL ENCOUNTER (OUTPATIENT)
Dept: MAMMOGRAPHY | Facility: HOSPITAL | Age: 72
Discharge: HOME | End: 2022-12-05
Attending: INTERNAL MEDICINE
Payer: MEDICARE

## 2022-12-05 DIAGNOSIS — Z12.31 ENCOUNTER FOR SCREENING MAMMOGRAM FOR MALIGNANT NEOPLASM OF BREAST: ICD-10-CM

## 2022-12-05 PROCEDURE — 77063 BREAST TOMOSYNTHESIS BI: CPT | Performed by: INTERNAL MEDICINE

## 2022-12-05 PROCEDURE — 77067 SCR MAMMO BI INCL CAD: CPT | Performed by: INTERNAL MEDICINE

## 2022-12-14 ENCOUNTER — HOSPITAL ENCOUNTER (OUTPATIENT)
Dept: CT IMAGING | Facility: HOSPITAL | Age: 72
Discharge: HOME | End: 2022-12-14
Attending: INTERNAL MEDICINE
Payer: MEDICARE

## 2022-12-14 DIAGNOSIS — G89.29 CHRONIC HEADACHES: ICD-10-CM

## 2022-12-14 DIAGNOSIS — R51.9 CHRONIC HEADACHES: ICD-10-CM

## 2022-12-14 PROCEDURE — 70486 CT MAXILLOFACIAL W/O DYE: CPT | Performed by: INTERNAL MEDICINE

## 2023-01-07 ENCOUNTER — LAB ENCOUNTER (OUTPATIENT)
Dept: LAB | Facility: HOSPITAL | Age: 73
End: 2023-01-07
Attending: INTERNAL MEDICINE
Payer: MEDICARE

## 2023-01-07 DIAGNOSIS — E10.9 TYPE 1 DIABETES (HCC): Primary | ICD-10-CM

## 2023-01-07 DIAGNOSIS — E03.9 HYPOTHYROIDISM: ICD-10-CM

## 2023-01-07 LAB
ALBUMIN SERPL-MCNC: 3.2 G/DL (ref 3.4–5)
ALBUMIN/GLOB SERPL: 0.9 {RATIO} (ref 1–2)
ALP LIVER SERPL-CCNC: 106 U/L
ALT SERPL-CCNC: 23 U/L
ANION GAP SERPL CALC-SCNC: 5 MMOL/L (ref 0–18)
AST SERPL-CCNC: 17 U/L (ref 15–37)
BILIRUB SERPL-MCNC: 0.4 MG/DL (ref 0.1–2)
BUN BLD-MCNC: 23 MG/DL (ref 7–18)
BUN/CREAT SERPL: 21.9 (ref 10–20)
CALCIUM BLD-MCNC: 8.9 MG/DL (ref 8.5–10.1)
CHLORIDE SERPL-SCNC: 102 MMOL/L (ref 98–112)
CHOLEST SERPL-MCNC: 153 MG/DL (ref ?–200)
CO2 SERPL-SCNC: 33 MMOL/L (ref 21–32)
CREAT BLD-MCNC: 1.05 MG/DL
CREAT UR-SCNC: 22.1 MG/DL
EST. AVERAGE GLUCOSE BLD GHB EST-MCNC: 217 MG/DL (ref 68–126)
FASTING PATIENT LIPID ANSWER: YES
FASTING STATUS PATIENT QL REPORTED: YES
GFR SERPLBLD BASED ON 1.73 SQ M-ARVRAT: 56 ML/MIN/1.73M2 (ref 60–?)
GLOBULIN PLAS-MCNC: 3.5 G/DL (ref 2.8–4.4)
GLUCOSE BLD-MCNC: 159 MG/DL (ref 70–99)
HBA1C MFR BLD: 9.2 % (ref ?–5.7)
HDLC SERPL-MCNC: 74 MG/DL (ref 40–59)
LDLC SERPL CALC-MCNC: 62 MG/DL (ref ?–100)
MICROALBUMIN UR-MCNC: <0.5 MG/DL
NONHDLC SERPL-MCNC: 79 MG/DL (ref ?–130)
OSMOLALITY SERPL CALC.SUM OF ELEC: 297 MOSM/KG (ref 275–295)
POTASSIUM SERPL-SCNC: 3.5 MMOL/L (ref 3.5–5.1)
PROT SERPL-MCNC: 6.7 G/DL (ref 6.4–8.2)
SODIUM SERPL-SCNC: 140 MMOL/L (ref 136–145)
T3FREE SERPL-MCNC: 2.1 PG/ML (ref 2.4–4.2)
T4 FREE SERPL-MCNC: 1.5 NG/DL (ref 0.8–1.7)
TRIGL SERPL-MCNC: 93 MG/DL (ref 30–149)
TSI SER-ACNC: 1.9 MIU/ML (ref 0.36–3.74)
VLDLC SERPL CALC-MCNC: 14 MG/DL (ref 0–30)

## 2023-01-07 PROCEDURE — 80061 LIPID PANEL: CPT

## 2023-01-07 PROCEDURE — 36415 COLL VENOUS BLD VENIPUNCTURE: CPT

## 2023-01-07 PROCEDURE — 83036 HEMOGLOBIN GLYCOSYLATED A1C: CPT

## 2023-01-07 PROCEDURE — 82570 ASSAY OF URINE CREATININE: CPT

## 2023-01-07 PROCEDURE — 80053 COMPREHEN METABOLIC PANEL: CPT

## 2023-01-07 PROCEDURE — 84439 ASSAY OF FREE THYROXINE: CPT

## 2023-01-07 PROCEDURE — 84443 ASSAY THYROID STIM HORMONE: CPT

## 2023-01-07 PROCEDURE — 82043 UR ALBUMIN QUANTITATIVE: CPT

## 2023-01-07 PROCEDURE — 84481 FREE ASSAY (FT-3): CPT

## 2023-01-09 PROBLEM — I73.9 PERIPHERAL VASCULAR DISEASE (CMD): Status: ACTIVE | Noted: 2023-01-09

## 2023-01-09 PROBLEM — E10.59 TYPE 1 DIABETES MELLITUS WITH CIRCULATORY COMPLICATION (CMD): Status: ACTIVE | Noted: 2019-03-26

## 2023-01-19 ENCOUNTER — OFFICE VISIT (OUTPATIENT)
Dept: OTOLARYNGOLOGY | Facility: CLINIC | Age: 73
End: 2023-01-19

## 2023-01-19 VITALS — TEMPERATURE: 97 F | WEIGHT: 182 LBS | BODY MASS INDEX: 33.49 KG/M2 | HEIGHT: 62 IN

## 2023-01-19 DIAGNOSIS — J32.0 CHRONIC LEFT MAXILLARY SINUSITIS: ICD-10-CM

## 2023-01-19 DIAGNOSIS — J01.00 ACUTE MAXILLARY SINUSITIS, RECURRENCE NOT SPECIFIED: Primary | ICD-10-CM

## 2023-01-19 PROCEDURE — 31231 NASAL ENDOSCOPY DX: CPT | Performed by: SPECIALIST

## 2023-01-19 PROCEDURE — 99203 OFFICE O/P NEW LOW 30 MIN: CPT | Performed by: SPECIALIST

## 2023-01-19 RX ORDER — DOXYCYCLINE HYCLATE 100 MG/1
100 CAPSULE ORAL 2 TIMES DAILY
Qty: 28 CAPSULE | Refills: 0 | Status: SHIPPED | OUTPATIENT
Start: 2023-01-19

## 2023-01-19 RX ORDER — FLUTICASONE PROPIONATE 50 MCG
1 SPRAY, SUSPENSION (ML) NASAL 2 TIMES DAILY
Qty: 16 G | Refills: 3 | Status: SHIPPED | OUTPATIENT
Start: 2023-01-19

## 2023-01-19 NOTE — PATIENT INSTRUCTIONS
You had an acute right sinusitis. We reviewed your CT scan films together. I placed you on doxycycline and Flonase. Follow-up in 3 weeks time, sooner if problems.

## 2023-02-08 ENCOUNTER — MED REC SCAN ONLY (OUTPATIENT)
Dept: INTERNAL MEDICINE CLINIC | Facility: CLINIC | Age: 73
End: 2023-02-08

## 2023-02-13 ENCOUNTER — MED REC SCAN ONLY (OUTPATIENT)
Dept: INTERNAL MEDICINE CLINIC | Facility: CLINIC | Age: 73
End: 2023-02-13

## 2023-02-15 ENCOUNTER — OFFICE VISIT (OUTPATIENT)
Dept: OTOLARYNGOLOGY | Facility: CLINIC | Age: 73
End: 2023-02-15

## 2023-02-15 DIAGNOSIS — J01.00 ACUTE MAXILLARY SINUSITIS, RECURRENCE NOT SPECIFIED: Primary | ICD-10-CM

## 2023-02-15 DIAGNOSIS — J34.2 NASAL SEPTAL DEVIATION: ICD-10-CM

## 2023-02-15 PROCEDURE — 99213 OFFICE O/P EST LOW 20 MIN: CPT | Performed by: SPECIALIST

## 2023-02-15 RX ORDER — DOXYCYCLINE HYCLATE 100 MG/1
100 CAPSULE ORAL 2 TIMES DAILY
Qty: 14 CAPSULE | Refills: 0 | Status: SHIPPED | OUTPATIENT
Start: 2023-02-15

## 2023-02-15 NOTE — PATIENT INSTRUCTIONS
As you had a marked improvement but still some whitish drainage from your middle meatal area, 1 additional week of doxycycline was ordered. Follow-up in 3 months time, sooner if problems. Continue Flonase nasal spray.

## 2023-02-17 ENCOUNTER — OFFICE VISIT (OUTPATIENT)
Dept: INTERNAL MEDICINE CLINIC | Facility: CLINIC | Age: 73
End: 2023-02-17
Payer: MEDICARE

## 2023-02-17 VITALS
DIASTOLIC BLOOD PRESSURE: 70 MMHG | SYSTOLIC BLOOD PRESSURE: 140 MMHG | HEIGHT: 62 IN | BODY MASS INDEX: 33.49 KG/M2 | WEIGHT: 182 LBS

## 2023-02-17 DIAGNOSIS — I10 ESSENTIAL HYPERTENSION, BENIGN: ICD-10-CM

## 2023-02-17 DIAGNOSIS — E10.65 TYPE 1 DIABETES MELLITUS WITH HYPERGLYCEMIA (HCC): ICD-10-CM

## 2023-02-17 DIAGNOSIS — E78.2 MIXED HYPERLIPIDEMIA: ICD-10-CM

## 2023-02-17 DIAGNOSIS — Z00.00 MEDICARE ANNUAL WELLNESS VISIT, SUBSEQUENT: Primary | ICD-10-CM

## 2023-02-17 DIAGNOSIS — G47.33 OSA (OBSTRUCTIVE SLEEP APNEA): ICD-10-CM

## 2023-02-17 PROCEDURE — G0439 PPPS, SUBSEQ VISIT: HCPCS | Performed by: INTERNAL MEDICINE

## 2023-02-17 RX ORDER — FUROSEMIDE 40 MG/1
40 TABLET ORAL DAILY
Qty: 90 TABLET | Refills: 3 | Status: SHIPPED | OUTPATIENT
Start: 2023-02-17 | End: 2024-02-12

## 2023-04-04 ENCOUNTER — LAB ENCOUNTER (OUTPATIENT)
Dept: LAB | Facility: HOSPITAL | Age: 73
End: 2023-04-04
Attending: INTERNAL MEDICINE
Payer: MEDICARE

## 2023-04-04 DIAGNOSIS — E03.9 HYPOTHYROIDISM, ADULT: ICD-10-CM

## 2023-04-04 DIAGNOSIS — E10.9 DIABETES TYPE I (HCC): Primary | ICD-10-CM

## 2023-04-04 LAB
ALBUMIN SERPL-MCNC: 3.1 G/DL (ref 3.4–5)
ALBUMIN/GLOB SERPL: 1 {RATIO} (ref 1–2)
ALP LIVER SERPL-CCNC: 116 U/L
ALT SERPL-CCNC: 24 U/L
ANION GAP SERPL CALC-SCNC: 3 MMOL/L (ref 0–18)
AST SERPL-CCNC: 19 U/L (ref 15–37)
BILIRUB SERPL-MCNC: 0.4 MG/DL (ref 0.1–2)
BUN BLD-MCNC: 17 MG/DL (ref 7–18)
BUN/CREAT SERPL: 17.9 (ref 10–20)
CALCIUM BLD-MCNC: 8.9 MG/DL (ref 8.5–10.1)
CHLORIDE SERPL-SCNC: 105 MMOL/L (ref 98–112)
CHOLEST SERPL-MCNC: 147 MG/DL (ref ?–200)
CO2 SERPL-SCNC: 32 MMOL/L (ref 21–32)
CREAT BLD-MCNC: 0.95 MG/DL
CREAT UR-SCNC: 55.6 MG/DL
EST. AVERAGE GLUCOSE BLD GHB EST-MCNC: 220 MG/DL (ref 68–126)
FASTING PATIENT LIPID ANSWER: YES
FASTING STATUS PATIENT QL REPORTED: YES
GFR SERPLBLD BASED ON 1.73 SQ M-ARVRAT: 63 ML/MIN/1.73M2 (ref 60–?)
GLOBULIN PLAS-MCNC: 3.2 G/DL (ref 2.8–4.4)
GLUCOSE BLD-MCNC: 229 MG/DL (ref 70–99)
HBA1C MFR BLD: 9.3 %
HBA1C MFR BLD: 9.3 % (ref ?–5.7)
HDLC SERPL-MCNC: 68 MG/DL (ref 40–59)
LDLC SERPL CALC-MCNC: 64 MG/DL (ref ?–100)
MICROALBUMIN UR-MCNC: 0.71 MG/DL
MICROALBUMIN/CREAT 24H UR-RTO: 12.8 UG/MG (ref ?–30)
NONHDLC SERPL-MCNC: 79 MG/DL (ref ?–130)
OSMOLALITY SERPL CALC.SUM OF ELEC: 299 MOSM/KG (ref 275–295)
POTASSIUM SERPL-SCNC: 3.8 MMOL/L (ref 3.5–5.1)
PROT SERPL-MCNC: 6.3 G/DL (ref 6.4–8.2)
SODIUM SERPL-SCNC: 140 MMOL/L (ref 136–145)
T3FREE SERPL-MCNC: 2.37 PG/ML (ref 2.4–4.2)
T4 FREE SERPL-MCNC: 1.4 NG/DL (ref 0.8–1.7)
TRIGL SERPL-MCNC: 80 MG/DL (ref 30–149)
TSI SER-ACNC: 1.01 MIU/ML (ref 0.36–3.74)
VLDLC SERPL CALC-MCNC: 12 MG/DL (ref 0–30)

## 2023-04-04 PROCEDURE — 84443 ASSAY THYROID STIM HORMONE: CPT

## 2023-04-04 PROCEDURE — 82570 ASSAY OF URINE CREATININE: CPT

## 2023-04-04 PROCEDURE — 84481 FREE ASSAY (FT-3): CPT

## 2023-04-04 PROCEDURE — 84439 ASSAY OF FREE THYROXINE: CPT

## 2023-04-04 PROCEDURE — 80053 COMPREHEN METABOLIC PANEL: CPT

## 2023-04-04 PROCEDURE — 83036 HEMOGLOBIN GLYCOSYLATED A1C: CPT

## 2023-04-04 PROCEDURE — 36415 COLL VENOUS BLD VENIPUNCTURE: CPT

## 2023-04-04 PROCEDURE — 82043 UR ALBUMIN QUANTITATIVE: CPT

## 2023-04-04 PROCEDURE — 80061 LIPID PANEL: CPT

## 2023-04-10 PROBLEM — I25.10 ATHEROSCLEROSIS OF CORONARY ARTERY: Status: ACTIVE | Noted: 2019-03-26

## 2023-06-07 ENCOUNTER — OFFICE VISIT (OUTPATIENT)
Dept: OTOLARYNGOLOGY | Facility: CLINIC | Age: 73
End: 2023-06-07

## 2023-06-07 DIAGNOSIS — J32.0 CHRONIC LEFT MAXILLARY SINUSITIS: ICD-10-CM

## 2023-06-07 DIAGNOSIS — B96.89 ACUTE BACTERIAL SINUSITIS: Primary | ICD-10-CM

## 2023-06-07 DIAGNOSIS — J01.90 ACUTE BACTERIAL SINUSITIS: Primary | ICD-10-CM

## 2023-06-07 DIAGNOSIS — J01.00 ACUTE MAXILLARY SINUSITIS, RECURRENCE NOT SPECIFIED: ICD-10-CM

## 2023-06-07 PROCEDURE — 99213 OFFICE O/P EST LOW 20 MIN: CPT | Performed by: SPECIALIST

## 2023-06-07 NOTE — PATIENT INSTRUCTIONS
You had persistent purulence from your right middle meatus consistent with your past CT scan done in December showing an acute right maxillary sinusitis. A culture was taken. I will call you with these results and she was antibiotics based on this.

## 2023-06-09 ENCOUNTER — CLINICAL ABSTRACT (OUTPATIENT)
Dept: CARE COORDINATION | Age: 73
End: 2023-06-09

## 2023-06-09 RX ORDER — CIPROFLOXACIN 500 MG/1
500 TABLET, FILM COATED ORAL EVERY 12 HOURS
Qty: 20 TABLET | Refills: 0 | Status: SHIPPED | OUTPATIENT
Start: 2023-06-09

## 2023-06-16 ENCOUNTER — TELEPHONE (OUTPATIENT)
Dept: INTERNAL MEDICINE CLINIC | Facility: CLINIC | Age: 73
End: 2023-06-16

## 2023-06-16 NOTE — TELEPHONE ENCOUNTER
Call back to patient per triage voice message on phone. Patient states that she has had left shoulder pain for 7-10 days. Denies injury. Patient requesting order for physical therapy. Appointment made with CARINE Antunez for evaluation.     Future Appointments   Date Time Provider Tomer Camachoi   6/19/2023 10:30 AM DOUG Herrera [address] [place] [address]   7/6/2023 10:00 AM MD BILL Mena   7/6/2023  2:00 PM Deidra Wade MD [address] [facility]   10/2/2023  1:00 PM Elijah Mccollum  [facility] HEM ONC EMO

## 2023-06-19 ENCOUNTER — OFFICE VISIT (OUTPATIENT)
Dept: INTERNAL MEDICINE CLINIC | Facility: CLINIC | Age: 73
End: 2023-06-19
Payer: MEDICARE

## 2023-06-19 VITALS
DIASTOLIC BLOOD PRESSURE: 74 MMHG | HEIGHT: 62 IN | RESPIRATION RATE: 17 BRPM | HEART RATE: 70 BPM | SYSTOLIC BLOOD PRESSURE: 126 MMHG | WEIGHT: 185 LBS | OXYGEN SATURATION: 97 % | BODY MASS INDEX: 34.04 KG/M2

## 2023-06-19 DIAGNOSIS — M25.512 LEFT SHOULDER PAIN, UNSPECIFIED CHRONICITY: Primary | ICD-10-CM

## 2023-06-19 PROCEDURE — 99213 OFFICE O/P EST LOW 20 MIN: CPT

## 2023-06-21 ENCOUNTER — HOSPITAL ENCOUNTER (OUTPATIENT)
Dept: GENERAL RADIOLOGY | Age: 73
Discharge: HOME | End: 2023-06-21
Payer: MEDICARE

## 2023-06-21 DIAGNOSIS — M25.512 LEFT SHOULDER PAIN, UNSPECIFIED CHRONICITY: ICD-10-CM

## 2023-06-21 PROCEDURE — 73030 X-RAY EXAM OF SHOULDER: CPT

## 2023-06-22 ENCOUNTER — TELEPHONE (OUTPATIENT)
Dept: INTERNAL MEDICINE CLINIC | Facility: CLINIC | Age: 73
End: 2023-06-22

## 2023-06-23 ENCOUNTER — OFFICE VISIT (OUTPATIENT)
Dept: FAMILY MEDICINE CLINIC | Facility: CLINIC | Age: 73
End: 2023-06-23
Payer: MEDICARE

## 2023-06-23 ENCOUNTER — TELEPHONE (OUTPATIENT)
Dept: INTERNAL MEDICINE CLINIC | Facility: CLINIC | Age: 73
End: 2023-06-23

## 2023-06-23 VITALS
RESPIRATION RATE: 16 BRPM | HEART RATE: 74 BPM | WEIGHT: 180 LBS | HEIGHT: 62 IN | BODY MASS INDEX: 33.13 KG/M2 | OXYGEN SATURATION: 100 % | TEMPERATURE: 97 F

## 2023-06-23 DIAGNOSIS — S91.312A LACERATION OF LEFT FOOT, INITIAL ENCOUNTER: ICD-10-CM

## 2023-06-23 DIAGNOSIS — R30.0 DYSURIA: ICD-10-CM

## 2023-06-23 DIAGNOSIS — N89.8 VAGINAL ITCHING: Primary | ICD-10-CM

## 2023-06-23 LAB
APPEARANCE: CLEAR
BILIRUBIN: NEGATIVE
GLUCOSE (URINE DIPSTICK): NEGATIVE MG/DL
KETONES (URINE DIPSTICK): NEGATIVE MG/DL
MULTISTIX LOT#: ABNORMAL NUMERIC
NITRITE, URINE: NEGATIVE
OCCULT BLOOD: NEGATIVE
PH, URINE: 5.5 (ref 4.5–8)
PROTEIN (URINE DIPSTICK): NEGATIVE MG/DL
SPECIFIC GRAVITY: 1.01 (ref 1–1.03)
URINE-COLOR: YELLOW
UROBILINOGEN,SEMI-QN: 0.2 MG/DL (ref 0–1.9)

## 2023-06-23 PROCEDURE — 99213 OFFICE O/P EST LOW 20 MIN: CPT | Performed by: NURSE PRACTITIONER

## 2023-06-23 PROCEDURE — 81003 URINALYSIS AUTO W/O SCOPE: CPT | Performed by: NURSE PRACTITIONER

## 2023-06-23 PROCEDURE — 87086 URINE CULTURE/COLONY COUNT: CPT | Performed by: NURSE PRACTITIONER

## 2023-06-24 NOTE — TELEPHONE ENCOUNTER
Spoke with patient about shoulder x-ray result. Physical therapy is beginning to help. She will be out of town for a week and then resume physical therapy.   Encouraged her to make the appointment for the physiatry referral that was put in place 6/19/23

## 2023-07-06 PROBLEM — M25.812 IMPINGEMENT OF LEFT SHOULDER: Status: ACTIVE | Noted: 2023-07-06

## 2023-07-06 PROBLEM — I10 HYPERTENSION: Status: ACTIVE | Noted: 2019-03-26

## 2023-07-06 PROBLEM — M19.012 ARTHRITIS OF SHOULDER REGION, LEFT: Status: ACTIVE | Noted: 2023-07-06

## 2023-07-06 PROBLEM — M25.512 LEFT SHOULDER PAIN: Status: ACTIVE | Noted: 2023-07-06

## 2023-07-07 ENCOUNTER — LAB ENCOUNTER (OUTPATIENT)
Dept: LAB | Facility: HOSPITAL | Age: 73
End: 2023-07-07
Attending: INTERNAL MEDICINE
Payer: MEDICARE

## 2023-07-07 DIAGNOSIS — E10.59 TYPE 1 DIABETES MELLITUS WITH VASCULAR DISEASE (HCC): Primary | ICD-10-CM

## 2023-07-07 LAB
ALBUMIN SERPL-MCNC: 3.2 G/DL (ref 3.4–5)
ALBUMIN/GLOB SERPL: 0.9 {RATIO} (ref 1–2)
ALP LIVER SERPL-CCNC: 100 U/L
ALT SERPL-CCNC: 27 U/L
ANION GAP SERPL CALC-SCNC: 6 MMOL/L (ref 0–18)
AST SERPL-CCNC: 19 U/L (ref 15–37)
BILIRUB SERPL-MCNC: 0.5 MG/DL (ref 0.1–2)
BUN BLD-MCNC: 28 MG/DL (ref 7–18)
BUN/CREAT SERPL: 25.5 (ref 10–20)
CALCIUM BLD-MCNC: 8.8 MG/DL (ref 8.5–10.1)
CHLORIDE SERPL-SCNC: 101 MMOL/L (ref 98–112)
CHOLEST SERPL-MCNC: 180 MG/DL (ref ?–200)
CO2 SERPL-SCNC: 30 MMOL/L (ref 21–32)
CREAT BLD-MCNC: 1.1 MG/DL
CREAT UR-SCNC: 35.4 MG/DL
EST. AVERAGE GLUCOSE BLD GHB EST-MCNC: 235 MG/DL (ref 68–126)
FASTING PATIENT LIPID ANSWER: YES
FASTING STATUS PATIENT QL REPORTED: YES
GFR SERPLBLD BASED ON 1.73 SQ M-ARVRAT: 53 ML/MIN/1.73M2 (ref 60–?)
GLOBULIN PLAS-MCNC: 3.4 G/DL (ref 2.8–4.4)
GLUCOSE BLD-MCNC: 359 MG/DL (ref 70–99)
HBA1C MFR BLD: 9.8 % (ref ?–5.7)
HDLC SERPL-MCNC: 78 MG/DL (ref 40–59)
LDLC SERPL CALC-MCNC: 91 MG/DL (ref ?–100)
MICROALBUMIN UR-MCNC: 0.96 MG/DL
MICROALBUMIN/CREAT 24H UR-RTO: 27.1 UG/MG (ref ?–30)
NONHDLC SERPL-MCNC: 102 MG/DL (ref ?–130)
OSMOLALITY SERPL CALC.SUM OF ELEC: 304 MOSM/KG (ref 275–295)
POTASSIUM SERPL-SCNC: 4 MMOL/L (ref 3.5–5.1)
PROT SERPL-MCNC: 6.6 G/DL (ref 6.4–8.2)
SODIUM SERPL-SCNC: 137 MMOL/L (ref 136–145)
TRIGL SERPL-MCNC: 58 MG/DL (ref 30–149)
VLDLC SERPL CALC-MCNC: 9 MG/DL (ref 0–30)

## 2023-07-07 PROCEDURE — 80061 LIPID PANEL: CPT

## 2023-07-07 PROCEDURE — 80053 COMPREHEN METABOLIC PANEL: CPT

## 2023-07-07 PROCEDURE — 83036 HEMOGLOBIN GLYCOSYLATED A1C: CPT

## 2023-07-07 PROCEDURE — 82043 UR ALBUMIN QUANTITATIVE: CPT

## 2023-07-07 PROCEDURE — 36415 COLL VENOUS BLD VENIPUNCTURE: CPT

## 2023-07-07 PROCEDURE — 82570 ASSAY OF URINE CREATININE: CPT

## 2023-07-20 ENCOUNTER — OFFICE VISIT (OUTPATIENT)
Dept: OTOLARYNGOLOGY | Facility: CLINIC | Age: 73
End: 2023-07-20

## 2023-07-20 VITALS — BODY MASS INDEX: 33.13 KG/M2 | TEMPERATURE: 98 F | WEIGHT: 180 LBS | HEIGHT: 62 IN

## 2023-07-20 DIAGNOSIS — J32.0 CHRONIC RIGHT MAXILLARY SINUSITIS: ICD-10-CM

## 2023-07-20 DIAGNOSIS — J01.90 ACUTE BACTERIAL SINUSITIS: Primary | ICD-10-CM

## 2023-07-20 DIAGNOSIS — B96.89 ACUTE BACTERIAL SINUSITIS: Primary | ICD-10-CM

## 2023-07-20 PROCEDURE — 99213 OFFICE O/P EST LOW 20 MIN: CPT | Performed by: SPECIALIST

## 2023-07-20 RX ORDER — CIPROFLOXACIN 500 MG/1
500 TABLET, FILM COATED ORAL EVERY 12 HOURS
Qty: 20 TABLET | Refills: 0 | Status: SHIPPED | OUTPATIENT
Start: 2023-07-20

## 2023-07-20 RX ORDER — FLUCONAZOLE 150 MG/1
150 TABLET ORAL ONCE
Qty: 2 TABLET | Refills: 0 | Status: SHIPPED | OUTPATIENT
Start: 2023-07-20 | End: 2023-07-20

## 2023-07-20 NOTE — PATIENT INSTRUCTIONS
You have persistent purulence from your right middle meatus. A refill of your Cipro was ordered. Follow-up in 3 weeks time, sooner if problems.   I also ordered Diflucan per your request.

## 2023-08-07 PROBLEM — M75.81 ROTATOR CUFF TENDONITIS, RIGHT: Status: ACTIVE | Noted: 2023-08-07

## 2023-08-07 PROBLEM — M75.41 IMPINGEMENT SYNDROME OF RIGHT SHOULDER REGION: Status: ACTIVE | Noted: 2023-08-07

## 2023-08-07 PROBLEM — M19.011 ARTHRITIS OF SHOULDER REGION, RIGHT: Status: ACTIVE | Noted: 2023-08-07

## 2023-08-07 PROBLEM — M25.511 ACUTE PAIN OF RIGHT SHOULDER: Status: ACTIVE | Noted: 2023-08-07

## 2023-08-07 PROBLEM — E10.51: Status: ACTIVE | Noted: 2019-03-26

## 2023-08-08 ENCOUNTER — TELEPHONE (OUTPATIENT)
Dept: OTOLARYNGOLOGY | Facility: CLINIC | Age: 73
End: 2023-08-08

## 2023-08-08 RX ORDER — LEVOFLOXACIN 500 MG/1
500 TABLET, FILM COATED ORAL EVERY 24 HOURS
Qty: 10 TABLET | Refills: 0 | Status: SHIPPED | OUTPATIENT
Start: 2023-08-08

## 2023-08-08 NOTE — TELEPHONE ENCOUNTER
Patient stating medication is making her burp and no longer will take it   ciprofloxacin 500 MG Oral Tab   Please advise

## 2023-08-08 NOTE — TELEPHONE ENCOUNTER
Grew pseudomonas only sensitive to oral cipro or levaquin. Other antibiotics are IV. Does she want to change to levaquin. Can stop if infection is clear.   Should follow up  Not Specified    Cefepime <=2 Sensitive   Ceftazidime 2 Sensitive   Ciprofloxacin <=1 Sensitive   Gentamicin 4 Sensitive   Levofloxacin 0.5 Sensitive   Meropenem <=1 Sensitive   Piperacillin + Tazobactam <=4 Sensitive

## 2023-08-08 NOTE — TELEPHONE ENCOUNTER
Please advise if you would like her to take an alternative or okay to discontinue.    Future Appointments   Date Time Provider Tomer Holbrook   8/14/2023 10:10 AM Cely Hernandez MD [address] [facility] OF THE [facility]   8/15/2023 10:45 AM MD BILL Lockhart [facility] DIMA Camacho   10/2/2023  1:00 PM Oren Hair MD [facility] HEM ONC EMO

## 2023-08-08 NOTE — TELEPHONE ENCOUNTER
Patient states infection still present and willing to try levaquin. Pended. Please review and sign if agree.

## 2023-08-10 NOTE — TELEPHONE ENCOUNTER
Called patient to follow up, she picked up the LevoFLOXacin last night and has started the medication. Call complete.

## 2023-08-11 RX ORDER — FUROSEMIDE 40 MG/1
40 TABLET ORAL DAILY
Qty: 90 TABLET | Refills: 0 | Status: SHIPPED | OUTPATIENT
Start: 2023-08-11

## 2023-08-14 ENCOUNTER — OFFICE VISIT (OUTPATIENT)
Dept: OTOLARYNGOLOGY | Facility: CLINIC | Age: 73
End: 2023-08-14

## 2023-08-14 VITALS — BODY MASS INDEX: 33.13 KG/M2 | WEIGHT: 180 LBS | HEIGHT: 62 IN

## 2023-08-14 DIAGNOSIS — J01.90 ACUTE BACTERIAL SINUSITIS: Primary | ICD-10-CM

## 2023-08-14 DIAGNOSIS — J32.0 CHRONIC RIGHT MAXILLARY SINUSITIS: ICD-10-CM

## 2023-08-14 DIAGNOSIS — B96.89 ACUTE BACTERIAL SINUSITIS: Primary | ICD-10-CM

## 2023-08-14 PROCEDURE — 99213 OFFICE O/P EST LOW 20 MIN: CPT | Performed by: SPECIALIST

## 2023-08-30 ENCOUNTER — OFFICE VISIT (OUTPATIENT)
Dept: OTOLARYNGOLOGY | Facility: CLINIC | Age: 73
End: 2023-08-30

## 2023-08-30 ENCOUNTER — OFFICE VISIT (OUTPATIENT)
Dept: INTERNAL MEDICINE CLINIC | Facility: CLINIC | Age: 73
End: 2023-08-30
Payer: MEDICARE

## 2023-08-30 VITALS
SYSTOLIC BLOOD PRESSURE: 120 MMHG | TEMPERATURE: 98 F | WEIGHT: 184 LBS | HEART RATE: 73 BPM | OXYGEN SATURATION: 97 % | BODY MASS INDEX: 33.86 KG/M2 | DIASTOLIC BLOOD PRESSURE: 60 MMHG | HEIGHT: 62 IN

## 2023-08-30 DIAGNOSIS — J32.0 CHRONIC RIGHT MAXILLARY SINUSITIS: Primary | ICD-10-CM

## 2023-08-30 DIAGNOSIS — J01.90 ACUTE BACTERIAL SINUSITIS: ICD-10-CM

## 2023-08-30 DIAGNOSIS — B96.89 ACUTE BACTERIAL SINUSITIS: ICD-10-CM

## 2023-08-30 DIAGNOSIS — G47.33 OSA ON CPAP: Primary | ICD-10-CM

## 2023-08-30 PROCEDURE — 99213 OFFICE O/P EST LOW 20 MIN: CPT

## 2023-08-30 PROCEDURE — 99213 OFFICE O/P EST LOW 20 MIN: CPT | Performed by: SPECIALIST

## 2023-08-30 RX ORDER — LEVOFLOXACIN 500 MG/1
500 TABLET, FILM COATED ORAL EVERY 24 HOURS
Qty: 10 TABLET | Refills: 0 | Status: SHIPPED | OUTPATIENT
Start: 2023-08-30

## 2023-10-02 ENCOUNTER — OFFICE VISIT (OUTPATIENT)
Dept: HEMATOLOGY/ONCOLOGY | Facility: HOSPITAL | Age: 73
End: 2023-10-02
Attending: INTERNAL MEDICINE
Payer: MEDICARE

## 2023-10-02 VITALS
DIASTOLIC BLOOD PRESSURE: 88 MMHG | TEMPERATURE: 98 F | RESPIRATION RATE: 18 BRPM | OXYGEN SATURATION: 99 % | SYSTOLIC BLOOD PRESSURE: 121 MMHG | HEART RATE: 67 BPM | HEIGHT: 62 IN | BODY MASS INDEX: 34.3 KG/M2 | WEIGHT: 186.38 LBS

## 2023-10-02 DIAGNOSIS — Z85.3 ENCOUNTER FOR FOLLOW-UP SURVEILLANCE OF BREAST CANCER: ICD-10-CM

## 2023-10-02 DIAGNOSIS — Z12.31 ENCOUNTER FOR SCREENING MAMMOGRAM FOR MALIGNANT NEOPLASM OF BREAST: ICD-10-CM

## 2023-10-02 DIAGNOSIS — N64.4 BREAST PAIN, LEFT: ICD-10-CM

## 2023-10-02 DIAGNOSIS — Z85.3 HISTORY OF LEFT BREAST CANCER: Primary | ICD-10-CM

## 2023-10-02 DIAGNOSIS — Z08 ENCOUNTER FOR FOLLOW-UP SURVEILLANCE OF BREAST CANCER: ICD-10-CM

## 2023-10-02 DIAGNOSIS — N63.24 MASS OF LOWER INNER QUADRANT OF LEFT BREAST: ICD-10-CM

## 2023-10-02 PROCEDURE — 99214 OFFICE O/P EST MOD 30 MIN: CPT | Performed by: INTERNAL MEDICINE

## 2023-10-03 ENCOUNTER — TELEPHONE (OUTPATIENT)
Dept: PULMONOLOGY | Facility: CLINIC | Age: 73
End: 2023-10-03

## 2023-10-03 NOTE — TELEPHONE ENCOUNTER
Patient has appt to see Dr Lory Bear on 10/20/2023, but aware he will not be in office - patient requesting to be seen sooner than 1/3/2024. Please call. Thank you.

## 2023-10-03 NOTE — TELEPHONE ENCOUNTER
Pt was scheduled for consult for NANCY on 1/3/24. She accepted consult appt w/ Dr. Анна Sorto 11/24 4 pm [facility]. Appt info given. Pt voiced understanding. She declined to cancel appt on 1/3/24. This appt was changed to f/u.

## 2023-10-05 ENCOUNTER — OFFICE VISIT (OUTPATIENT)
Dept: OTOLARYNGOLOGY | Facility: CLINIC | Age: 73
End: 2023-10-05

## 2023-10-05 VITALS — WEIGHT: 186 LBS | HEIGHT: 62 IN | BODY MASS INDEX: 34.23 KG/M2

## 2023-10-05 DIAGNOSIS — J32.0 CHRONIC RIGHT MAXILLARY SINUSITIS: Primary | ICD-10-CM

## 2023-10-05 PROCEDURE — 99213 OFFICE O/P EST LOW 20 MIN: CPT | Performed by: SPECIALIST

## 2023-10-05 NOTE — PATIENT INSTRUCTIONS
A repeat CT scan was done to compare with the CT scan done in December of last year showing chronic right maxillary sinusitis. Persistent purulent postnasal drainage on the right. The culture grew Pseudomonas. We have tried to rounds of Levaquin. If the sinusitis is persistent and endoscopic sinus surgery will be recommended.

## 2023-10-07 ENCOUNTER — LAB ENCOUNTER (OUTPATIENT)
Dept: LAB | Facility: HOSPITAL | Age: 73
End: 2023-10-07
Attending: INTERNAL MEDICINE
Payer: MEDICARE

## 2023-10-07 DIAGNOSIS — E10.59 TYPE 1 DIABETES MELLITUS WITH VASCULAR DISEASE (HCC): ICD-10-CM

## 2023-10-07 DIAGNOSIS — I10 ESSENTIAL HYPERTENSION, MALIGNANT: Primary | ICD-10-CM

## 2023-10-07 LAB
ALBUMIN SERPL-MCNC: 3.4 G/DL (ref 3.4–5)
ALBUMIN/GLOB SERPL: 1 {RATIO} (ref 1–2)
ALP LIVER SERPL-CCNC: 101 U/L
ALT SERPL-CCNC: 20 U/L
ANION GAP SERPL CALC-SCNC: 6 MMOL/L (ref 0–18)
AST SERPL-CCNC: 16 U/L (ref 15–37)
BILIRUB SERPL-MCNC: 0.6 MG/DL (ref 0.1–2)
BUN BLD-MCNC: 27 MG/DL (ref 7–18)
BUN/CREAT SERPL: 27.8 (ref 10–20)
CALCIUM BLD-MCNC: 9.2 MG/DL (ref 8.5–10.1)
CHLORIDE SERPL-SCNC: 103 MMOL/L (ref 98–112)
CHOLEST SERPL-MCNC: 158 MG/DL (ref ?–200)
CO2 SERPL-SCNC: 33 MMOL/L (ref 21–32)
CREAT BLD-MCNC: 0.97 MG/DL
CREAT UR-MCNC: 14.3 MG/DL
CREAT UR-SCNC: 14.3 MG/DL
EGFRCR SERPLBLD CKD-EPI 2021: 61 ML/MIN/1.73M2 (ref 60–?)
EST. AVERAGE GLUCOSE BLD GHB EST-MCNC: 229 MG/DL (ref 68–126)
FASTING PATIENT LIPID ANSWER: YES
FASTING STATUS PATIENT QL REPORTED: YES
GFR SERPLBLD SCHWARTZ-ARVRAT: 61 ML/MIN/{1.73_M2}
GLOBULIN PLAS-MCNC: 3.3 G/DL (ref 2.8–4.4)
GLUCOSE BLD-MCNC: 112 MG/DL (ref 70–99)
HBA1C MFR BLD: 9.6 %
HBA1C MFR BLD: 9.6 % (ref ?–5.7)
HDLC SERPL-MCNC: 81 MG/DL (ref 40–59)
LDLC SERPL CALC-MCNC: 64 MG/DL (ref ?–100)
MICROALBUMIN 24H UR-MRATE: <0.5 MG/(24.H)
MICROALBUMIN UR-MCNC: <0.5 MG/DL
NONHDLC SERPL-MCNC: 77 MG/DL (ref ?–130)
OSMOLALITY SERPL CALC.SUM OF ELEC: 300 MOSM/KG (ref 275–295)
POTASSIUM SERPL-SCNC: 3.4 MMOL/L (ref 3.5–5.1)
PROT SERPL-MCNC: 6.7 G/DL (ref 6.4–8.2)
SODIUM SERPL-SCNC: 142 MMOL/L (ref 136–145)
T3FREE SERPL-MCNC: 2.1 PG/ML (ref 2.4–4.2)
T4 FREE SERPL-MCNC: 1.6 NG/DL (ref 0.8–1.7)
TRIGL SERPL-MCNC: 66 MG/DL (ref 30–149)
TSI SER-ACNC: 1.35 MIU/ML (ref 0.36–3.74)
VLDLC SERPL CALC-MCNC: 10 MG/DL (ref 0–30)

## 2023-10-07 PROCEDURE — 80053 COMPREHEN METABOLIC PANEL: CPT

## 2023-10-07 PROCEDURE — 82570 ASSAY OF URINE CREATININE: CPT

## 2023-10-07 PROCEDURE — 36415 COLL VENOUS BLD VENIPUNCTURE: CPT

## 2023-10-07 PROCEDURE — 84439 ASSAY OF FREE THYROXINE: CPT

## 2023-10-07 PROCEDURE — 84443 ASSAY THYROID STIM HORMONE: CPT

## 2023-10-07 PROCEDURE — 80061 LIPID PANEL: CPT

## 2023-10-07 PROCEDURE — 82043 UR ALBUMIN QUANTITATIVE: CPT

## 2023-10-07 PROCEDURE — 83036 HEMOGLOBIN GLYCOSYLATED A1C: CPT

## 2023-10-07 PROCEDURE — 84481 FREE ASSAY (FT-3): CPT

## 2023-10-12 ENCOUNTER — HOSPITAL ENCOUNTER (OUTPATIENT)
Dept: ULTRASOUND IMAGING | Facility: HOSPITAL | Age: 73
Discharge: HOME | End: 2023-10-12
Attending: INTERNAL MEDICINE
Payer: MEDICARE

## 2023-10-12 ENCOUNTER — HOSPITAL ENCOUNTER (OUTPATIENT)
Dept: MAMMOGRAPHY | Facility: HOSPITAL | Age: 73
Discharge: HOME | End: 2023-10-12
Attending: INTERNAL MEDICINE
Payer: MEDICARE

## 2023-10-12 DIAGNOSIS — N63.24 MASS OF LOWER INNER QUADRANT OF LEFT BREAST: ICD-10-CM

## 2023-10-12 DIAGNOSIS — N64.4 BREAST PAIN, LEFT: ICD-10-CM

## 2023-10-12 LAB — HM MAMMOGRAPHY BILATERAL: NORMAL

## 2023-10-12 PROCEDURE — 77066 DX MAMMO INCL CAD BI: CPT | Performed by: INTERNAL MEDICINE

## 2023-10-12 PROCEDURE — 77062 BREAST TOMOSYNTHESIS BI: CPT | Performed by: INTERNAL MEDICINE

## 2023-10-12 PROCEDURE — 76642 ULTRASOUND BREAST LIMITED: CPT | Performed by: INTERNAL MEDICINE

## 2023-10-16 ENCOUNTER — HOSPITAL ENCOUNTER (OUTPATIENT)
Age: 73
Discharge: HOME | End: 2023-10-16
Payer: MEDICARE

## 2023-10-16 ENCOUNTER — APPOINTMENT (OUTPATIENT)
Dept: GENERAL RADIOLOGY | Age: 73
End: 2023-10-16
Attending: NURSE PRACTITIONER
Payer: MEDICARE

## 2023-10-16 ENCOUNTER — OFFICE VISIT (OUTPATIENT)
Dept: FAMILY MEDICINE CLINIC | Facility: CLINIC | Age: 73
End: 2023-10-16
Payer: MEDICARE

## 2023-10-16 ENCOUNTER — TELEPHONE (OUTPATIENT)
Dept: HEMATOLOGY/ONCOLOGY | Facility: HOSPITAL | Age: 73
End: 2023-10-16

## 2023-10-16 VITALS
RESPIRATION RATE: 20 BRPM | DIASTOLIC BLOOD PRESSURE: 60 MMHG | HEIGHT: 62 IN | HEART RATE: 75 BPM | BODY MASS INDEX: 34.23 KG/M2 | OXYGEN SATURATION: 100 % | WEIGHT: 186 LBS | SYSTOLIC BLOOD PRESSURE: 146 MMHG | TEMPERATURE: 98 F

## 2023-10-16 VITALS — RESPIRATION RATE: 20 BRPM | TEMPERATURE: 98 F | HEART RATE: 77 BPM | OXYGEN SATURATION: 97 %

## 2023-10-16 DIAGNOSIS — R09.89 BIBASILAR CRACKLES: ICD-10-CM

## 2023-10-16 DIAGNOSIS — J40 BRONCHITIS: ICD-10-CM

## 2023-10-16 DIAGNOSIS — J01.90 ACUTE SINUSITIS, RECURRENCE NOT SPECIFIED, UNSPECIFIED LOCATION: Primary | ICD-10-CM

## 2023-10-16 DIAGNOSIS — R05.1 ACUTE COUGH: Primary | ICD-10-CM

## 2023-10-16 PROCEDURE — 99214 OFFICE O/P EST MOD 30 MIN: CPT

## 2023-10-16 PROCEDURE — 99213 OFFICE O/P EST LOW 20 MIN: CPT

## 2023-10-16 PROCEDURE — 71046 X-RAY EXAM CHEST 2 VIEWS: CPT | Performed by: NURSE PRACTITIONER

## 2023-10-16 RX ORDER — ALBUTEROL SULFATE 90 UG/1
2 AEROSOL, METERED RESPIRATORY (INHALATION) EVERY 4 HOURS PRN
Qty: 1 EACH | Refills: 0 | Status: SHIPPED | OUTPATIENT
Start: 2023-10-16 | End: 2023-11-15

## 2023-10-16 RX ORDER — BENZONATATE 100 MG/1
200 CAPSULE ORAL 3 TIMES DAILY PRN
Qty: 30 CAPSULE | Refills: 0 | Status: SHIPPED | OUTPATIENT
Start: 2023-10-16 | End: 2023-11-15

## 2023-10-16 RX ORDER — DOXYCYCLINE HYCLATE 100 MG/1
100 CAPSULE ORAL 2 TIMES DAILY
Qty: 20 CAPSULE | Refills: 0 | Status: SHIPPED | OUTPATIENT
Start: 2023-10-16 | End: 2023-10-26

## 2023-10-16 NOTE — DISCHARGE INSTRUCTIONS
Continue supportive care. Drink plenty of fluids. Tylenol as needed for pain or fever. Take the medications as prescribed. Follow-up closely with your primary care doctor. If you develop any worsening or concerning symptoms, please go to the nearest emergency department for further evaluation.

## 2023-10-17 NOTE — TELEPHONE ENCOUNTER
Spoke with Lane Seo. She stated she was confused about the US because it didn't list the results. Informed her that the [address] results are in the mammo report, and all the results were good. Patient verbalized understanding.

## 2023-10-18 ENCOUNTER — CLINICAL ABSTRACT (OUTPATIENT)
Dept: ENDOCRINOLOGY | Age: 73
End: 2023-10-18

## 2023-10-23 ENCOUNTER — HOSPITAL ENCOUNTER (OUTPATIENT)
Dept: CT IMAGING | Facility: HOSPITAL | Age: 73
Discharge: HOME | End: 2023-10-23
Attending: SPECIALIST

## 2023-10-23 DIAGNOSIS — J32.0 CHRONIC RIGHT MAXILLARY SINUSITIS: ICD-10-CM

## 2023-10-23 PROCEDURE — 70486 CT MAXILLOFACIAL W/O DYE: CPT | Performed by: SPECIALIST

## 2023-10-23 NOTE — PROGRESS NOTES
Discussed results with the patient. She will be leaving for [place]. Will make an appointment when she returns to discuss surgery.

## 2023-10-24 ENCOUNTER — TELEPHONE (OUTPATIENT)
Dept: PULMONOLOGY | Facility: CLINIC | Age: 73
End: 2023-10-24

## 2023-10-24 NOTE — TELEPHONE ENCOUNTER
Pt appt rescheduled to 12/1 due to MD schedule change    Pt would like to speak with RN regarding her CPAP usage- she st she's not sure if she should be using - pl call pt

## 2023-10-25 NOTE — TELEPHONE ENCOUNTER
Spoke with patient states she has not been using her CPAP machine for last week and a half, CT Sinus shows water in her sinuses, patient afraid CPAP is causing sinus issue, states she will call previous sleep doctor's office to request records/sleep studies. Patient has appointment with Dr. Анна Sorto on 12/1/23.

## 2023-10-26 ENCOUNTER — TELEPHONE (OUTPATIENT)
Dept: INTERNAL MEDICINE CLINIC | Facility: CLINIC | Age: 73
End: 2023-10-26

## 2023-11-02 RX ORDER — FUROSEMIDE 40 MG/1
40 TABLET ORAL DAILY
Qty: 90 TABLET | Refills: 0 | Status: SHIPPED | OUTPATIENT
Start: 2023-11-02

## 2023-11-30 ENCOUNTER — OFFICE VISIT (OUTPATIENT)
Dept: AUDIOLOGY | Facility: CLINIC | Age: 73
End: 2023-11-30

## 2023-11-30 ENCOUNTER — OFFICE VISIT (OUTPATIENT)
Dept: OTOLARYNGOLOGY | Facility: CLINIC | Age: 73
End: 2023-11-30

## 2023-11-30 DIAGNOSIS — H91.13 PRESBYCUSIS, BILATERAL: Primary | ICD-10-CM

## 2023-11-30 DIAGNOSIS — J32.0 CHRONIC RIGHT MAXILLARY SINUSITIS: ICD-10-CM

## 2023-11-30 DIAGNOSIS — H91.93 DECREASED HEARING OF BOTH EARS: Primary | ICD-10-CM

## 2023-11-30 DIAGNOSIS — J32.2 CHRONIC ETHMOIDAL SINUSITIS: ICD-10-CM

## 2023-11-30 PROCEDURE — 92557 COMPREHENSIVE HEARING TEST: CPT | Performed by: AUDIOLOGIST

## 2023-11-30 PROCEDURE — 92567 TYMPANOMETRY: CPT | Performed by: AUDIOLOGIST

## 2023-11-30 PROCEDURE — 99214 OFFICE O/P EST MOD 30 MIN: CPT | Performed by: SPECIALIST

## 2023-11-30 RX ORDER — CIPROFLOXACIN 500 MG/1
500 TABLET, FILM COATED ORAL EVERY 12 HOURS
Qty: 20 TABLET | Refills: 0 | Status: SHIPPED | OUTPATIENT
Start: 2023-11-30

## 2023-12-01 ENCOUNTER — OFFICE VISIT (OUTPATIENT)
Dept: PULMONOLOGY | Facility: CLINIC | Age: 73
End: 2023-12-01

## 2023-12-01 VITALS — BODY MASS INDEX: 34 KG/M2 | OXYGEN SATURATION: 96 % | WEIGHT: 185 LBS

## 2023-12-01 DIAGNOSIS — G47.33 OSA ON CPAP: Primary | ICD-10-CM

## 2023-12-01 PROCEDURE — 99204 OFFICE O/P NEW MOD 45 MIN: CPT | Performed by: INTERNAL MEDICINE

## 2023-12-01 NOTE — H&P
[facility]    History and Physical    [place] Patient Status:  Specimen    3/21/1950 MRN [id number]   Location [facility] Attending No att. providers found   Hosp Day # 0 PCP Adelaide Huynh MD     Date:  2023      History provided by:patient  HPI:     Chief Complaint   Patient presents with    Consult     Pt has had problems with CPAP machine.  PT had sleep study done 1 year ago      HPI    59-year-old female with history of moderate to severe NANCY with AHI of 30 on auto CPAP 5-10 CWP with a nasal pillow with underlying history of HTN, HL, CAD, chronic sinusitis    Patient was very compliant with the use of CPAP every night which she had issue with sinusitis last month and now she is been off CPAP especially her nasal pillow is bothering her now she is going to use a nasal CPAP  With the use of CPAP her symptoms are much better with no significant daytime sleepiness or fatigue  Regular time in bed  Right-sided sinus pressure with no significant nasal congestion  Denied any chest pain or shortness of breath or cough or wheeze  No history of smoking  No lower extremity edema or pain or calf tenderness      History     Past Medical History:   Diagnosis Date    Ankle fracture, lateral malleolus, closed     Breast cancer ([address] 2012    Breast cancer of upper-outer quadrant of left female breast ([address] 2012    Breast cancer, left ([address]     lumpectomy 2009    Breast cancer, left ([address]     left breast re-excision, left axillary SLNB    Breast tenderness in female 6/10/2018    CAD (coronary artery disease) 2012    Carotid artery disease ([address]     LEFT heart cath adn PCI 2010    Diabetes mellitus ([address] 2012    Encounter for fitting of portacath 2009    venous access; portacath placement, 2009    Encounter for imaging to assess osteopenia 2016    Encounter for monitoring aromatase inhibitor therapy 6/10/2018    Essential hypertension     GERD (gastroesophageal reflux disease) 2012    H/O  section     x2    H/O vitrectomy     LEFT vitrectomy    Hip bursitis     left    Hyperlipidemia 2012    Hypertension 2012    Hypothyroidism 2012    Malignant neoplasm of upper-outer quadrant of left breast in female, estrogen receptor positive  2012    Osteopenia of lumbar spine 6/10/2018    Psoriasis 2012    Thrombus 2009    SVC thrombus; Thrombolysis 2009    Trigger finger of both hands 12/10/2012    Type 1 diabetes mellitus ([address]     Type 1 diabetes mellitus with hyperglycemia ([address] 10/3/2021    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 Cecile Hills Right 2019    Dr. Cindy Fernando      LUMPECTOMY LEFT Left 2009    NEEDLE BIOPSY RIGHT      NEEDLE CORE BIOPSY, BREAST ([facility]) Right 10/07/2009    RIGHT breast nodules; mammatome core needle biopsies; 2009    RADIATION LEFT       Family History   Problem Relation Age of Onset    Heart Disease Mother         CAD - Premature    Heart Disease Father         (cause of death)    Breast Cancer Self 63     Social History:  Social History     Socioeconomic History    Marital status:    Tobacco Use    Smoking status: Never    Smokeless tobacco: Never   Vaping Use    Vaping Use: Never used   Substance and Sexual Activity    Alcohol use: Yes     Alcohol/week: 1.0 - 2.0 standard drink of alcohol     Types: 1 - 2 Glasses of wine per week     Comment: weekly    Drug use: No   Other Topics Concern    Caffeine Concern Yes     Comment: 4 cups coffee daily     Allergies/Medications: Allergies:    Allergies   Allergen Reactions    Penicillins HIVES     Other reaction(s): PENICILLINS  Other reaction(s): Unknown    Clindamycin     Dust Mite Extract OTHER (SEE COMMENTS)     Nasal congestion    Sulfa Antibiotics      (Not in a hospital admission)      Review of Systems:     Constitutional: Negative. HENT:  Positive for congestion and sinus pressure. Respiratory: Negative. Cardiovascular: Negative. Gastrointestinal: Negative. Skin: Negative. Neurological: Negative. Hematological: Negative. Psychiatric/Behavioral: Negative. Physical Exam:   Vital Signs:  Weight 185 lb (83.9 kg), SpO2 96%, not currently breastfeeding. Physical Exam  Constitutional:       General: She is not in acute distress. Appearance: Normal appearance. She is not ill-appearing. HENT:      Head: Atraumatic. Nose: Congestion present. Mouth/Throat:      Comments: Upper airway class III Mallampati score  Eyes:      General: No scleral icterus. Pupils: Pupils are equal, round, and reactive to light. Cardiovascular:      Rate and Rhythm: Normal rate. Heart sounds:      No gallop. Pulmonary:      Effort: Pulmonary effort is normal. No respiratory distress. Breath sounds: No stridor. No wheezing, rhonchi or rales. Abdominal:      General: Abdomen is flat. Bowel sounds are normal.      Palpations: Abdomen is soft. Musculoskeletal:      Right lower leg: No edema. Left lower leg: No edema. Skin:     General: Skin is dry. Neurological:      General: No focal deficit present. Mental Status: She is oriented to person, place, and time.            Results:     Lab Results   Component Value Date    WBC 7.1 04/08/2019    HGB 12.8 04/08/2019    HCT 39.8 04/08/2019    .0 04/08/2019    CREATSERUM 0.97 10/07/2023    BUN 27 (H) 10/07/2023     10/07/2023    K 3.4 (L) 10/07/2023     10/07/2023    CO2 33.0 (H) 10/07/2023     (H) 10/07/2023    CA 9.2 10/07/2023    ALB 3.4 10/07/2023    ALKPHO 101 10/07/2023    BILT 0.6 10/07/2023    TP 6.7 10/07/2023    AST 16 10/07/2023    ALT 20 10/07/2023    T4F 1.6 10/07/2023    TSH 1.350 10/07/2023    ESRML 23 11/28/2022    CRP 0.50 (H) 11/28/2022    CK 86 10/09/2018    POCGLU 239 08/21/2019         Assessment/Plan:     1-moderately severe NANCY with AHI of 30  On auto CPAP 5-10 CWP  Patient had excellent subjective and objective compliance and I reviewed her download data from September to October 2023 with 77% adherence with normal AHI  Patient stopped using CPAP last few weeks because of sinusitis    Patient not tolerating nasal pillow and now changing to a nasal CPAP  Encourage patient to use nasal CPAP with the same pressure  Keep regular sleep-wake cycles  Avoid driving if sleepy  Avoid working in heavy machinery if sleepy  Avoid sedative and narcotic and alcohol    2-chronic sinusitis  Followed by ENT              Guilherme Yap.  Slade Carballo MD  12/1/2023

## 2023-12-01 NOTE — PROGRESS NOTES
Jyoti West is a 75year old female. Chief Complaint   Patient presents with    Follow - Up     Patient is here to discuss CT results      HPI:   Patient here to discuss her CT scan scan findings as well as to discuss decreased hearing    Current Outpatient Medications   Medication Sig Dispense Refill    ciprofloxacin 500 MG Oral Tab Take 1 tablet (500 mg total) by mouth every 12 (twelve) hours. 20 tablet 0    furosemide 40 MG Oral Tab Take 1 tablet (40 mg total) by mouth daily. 90 tablet 0    omeprazole 20 MG Oral Capsule Delayed Release Take 1 capsule (20 mg total) by mouth before breakfast.      levoFLOXacin 500 MG Oral Tab Take 1 tablet (500 mg total) by mouth daily. 10 tablet 0    insulin aspart 100 Units/mL Injection Solution TAKE 60 UNITS DAILY VIA INSULIN PUMP. fluconazole 150 MG Oral Tab       ciprofloxacin 500 MG Oral Tab Take 1 tablet (500 mg total) by mouth every 12 (twelve) hours. 20 tablet 0    fluticasone propionate 50 MCG/ACT Nasal Suspension 1 spray by Nasal route 2 (two) times daily. 16 g 3    Calcipotriene 0.005 % External Solution APPLY TO AFFECTED AREA OR BODY TWICE A DAY      Fluticasone Propionate 50 MCG/ACT Nasal Suspension Two sprays each nostril once daily 1 Bottle 1    mometasone 0.1 % External Ointment       Cholecalciferol (VITAMIN D) 25 MCG (1000 UT) Oral Tab Take 25 mcg by mouth daily. ezetimibe 10 MG Oral Tab Take 1 tablet (10 mg total) by mouth. Insulin Infusion Pump Supplies (INSULIN PUMP SYRINGE RESERVOIR) Does not apply Misc as directed      Levothyroxine Sodium 125 MCG Oral Tab   4    simvastatin 20 MG Oral Tab   5    Naproxen Sodium 220 MG Oral Cap Take 220 mg by mouth 2 (two) times daily as needed. Albuterol Sulfate HFA (PROAIR HFA) 108 (90 Base) MCG/ACT Inhalation Aero Soln Inhale 2 puffs into the lungs every 4 (four) hours as needed for Wheezing or Shortness of Breath.  1 Inhaler 1    Metoprolol Succinate ER 25 MG Oral Tablet 24 Hr   4 triamcinolone acetonide 0.1 % External Ointment   1    Clobetasol Propionate 0.05 % External Solution   2    SANJU CONTOUR NEXT TEST In Vitro Strip   2    NOVOLOG 100 UNIT/ML Subcutaneous Solution   4    aspirin 81 MG Oral Chew Tab Chew  by mouth. Benazepril-Hydrochlorothiazide (LOTENSIN HCT) 10-12.5 MG Oral Tab Take 1 tablet by mouth daily. Omeprazole 40 MG Oral Capsule Delayed Release Take 1 capsule (40 mg total) by mouth daily. Past Medical History:   Diagnosis Date    Ankle fracture, lateral malleolus, closed     Breast cancer ([address] 2012    Breast cancer of upper-outer quadrant of left female breast ([address] 2012    Breast cancer, left ([address]     lumpectomy 2009    Breast cancer, left ([address]     left breast re-excision, left axillary SLNB    Breast tenderness in female 6/10/2018    CAD (coronary artery disease) 2012    Carotid artery disease ([address]     LEFT heart cath adn PCI 2010    Diabetes mellitus ([address] 2012    Encounter for fitting of portacath 2009    venous access; portacath placement, 2009    Encounter for imaging to assess osteopenia 2016    Encounter for monitoring aromatase inhibitor therapy 6/10/2018    Essential hypertension     GERD (gastroesophageal reflux disease) 2012    H/O  section     x2    H/O vitrectomy     LEFT vitrectomy    Hip bursitis     left    Hyperlipidemia 2012    Hypertension 2012    Hypothyroidism 2012    Malignant neoplasm of upper-outer quadrant of left breast in female, estrogen receptor positive  2012    Osteopenia of lumbar spine 6/10/2018    Psoriasis 2012    Thrombus     SVC thrombus;  Thrombolysis 2009    Trigger finger of both hands 12/10/2012    Type 1 diabetes mellitus ([address]     Type 1 diabetes mellitus with hyperglycemia ([address] 10/3/2021    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 ([address]       Social History:  Social History Socioeconomic History    Marital status:    Tobacco Use    Smoking status: Never    Smokeless tobacco: Never   Vaping Use    Vaping Use: Never used   Substance and Sexual Activity    Alcohol use: Yes     Alcohol/week: 1.0 - 2.0 standard drink of alcohol     Types: 1 - 2 Glasses of wine per week     Comment: weekly    Drug use: No   Other Topics Concern    Caffeine Concern Yes     Comment: 4 cups coffee daily        REVIEW OF SYSTEMS:   GENERAL HEALTH: feels well otherwise  GENERAL : denies fever, chills, sweats, weight loss, weight gain  SKIN: denies any unusual skin lesions or rashes  RESPIRATORY: denies shortness of breath with exertion  NEURO: denies headaches    EXAM:   There were no vitals taken for this visit. System Details   Skin Inspection - Normal.   Constitutional Overall appearance - Normal.   Head/Face Facial features - Normal. Eyebrows - Normal. Skull - Normal.   Eyes Conjunctiva - Right: Normal, Left: Normal. Pupil - Right: Normal, Left: Normal.    Ears Inspection - Right: Normal, Left: Normal.   Canal - Right: Normal, Left: Normal.   TM - Right: Normal, Left: Normal.   Nasal External nose - Normal.   Nasal septum - Normal.  Turbinates -persistent white purulence in the right middle meatus   Oral/Oropharynx Lips - Normal, Tonsils - Normal, Tongue - Normal    Neck Exam Inspection - Normal. Palpation - Normal. Parotid gland - Normal. Thyroid gland - Normal.   Lymph Detail Submental. Submandibular. Anterior cervical. Posterior cervical. Supraclavicular all without enlargement   Psychiatric Orientation - Oriented to time, place, person & situation. Appropriate mood and affect. Neurological Memory - Normal. Cranial nerves - Cranial nerves II through XII grossly intact. ASSESSMENT AND PLAN:   1. Decreased hearing of both ears  Audiogram shows mild to moderate hearing loss slightly worse in the right than the left ear with 100% word discrimination.   Audiogram reviewed with patient and she was given a copy at the time of her visit. - Audiology Referral - [facility])    2. Chronic right maxillary sinusitis  Reviewed entire CT including all the films with the patient which shows a complete opacification of the right maxillary sinus as well as anterior ethmoid sinus. Patient has been on multiple rounds of antibiotics for about 6 months time. Culture which was also reviewed showed a resistant Pseudomonas. Patient was replaced on oral Cipro. We discussed options including surgery versus even IV antibiotics. At this point in time she wants to just try the Cipro. Follow-up in 3 weeks time, sooner if problems. 3. Chronic ethmoidal sinusitis  I think very likely she will need to consider a surgery if she wants to clear up the condition. The patient indicates understanding of these issues and agrees to the plan.       Nadia England MD  11/30/2023  10:52 PM

## 2023-12-01 NOTE — PATIENT INSTRUCTIONS
Audiogram showed mild to moderate bilateral sensorineural hearing loss slightly worse in the right than the left ear. Word discrimination score 100% bilaterally. You have persistent purulence in your right middle meatus. Prior culture showed Pseudomonas. I placed you back on a trial of oral Cipro. Follow-up in 3 weeks time, sooner if problems. Think very likely you will need to consider an endoscopic right maxillary antrostomy and anterior ethmoidectomy.

## 2023-12-07 PROBLEM — S46.112A LABRAL TEAR OF LONG HEAD OF LEFT BICEPS TENDON: Status: ACTIVE | Noted: 2023-12-07

## 2023-12-28 ENCOUNTER — OFFICE VISIT (OUTPATIENT)
Dept: OTOLARYNGOLOGY | Facility: CLINIC | Age: 73
End: 2023-12-28

## 2023-12-28 DIAGNOSIS — J32.2 CHRONIC ETHMOIDAL SINUSITIS: ICD-10-CM

## 2023-12-28 DIAGNOSIS — J32.0 CHRONIC LEFT MAXILLARY SINUSITIS: ICD-10-CM

## 2023-12-28 DIAGNOSIS — J32.0 CHRONIC RIGHT MAXILLARY SINUSITIS: Primary | ICD-10-CM

## 2023-12-28 PROCEDURE — 99214 OFFICE O/P EST MOD 30 MIN: CPT | Performed by: SPECIALIST

## 2023-12-28 RX ORDER — CIPROFLOXACIN 500 MG/1
500 TABLET, FILM COATED ORAL EVERY 12 HOURS
Qty: 20 TABLET | Refills: 0 | Status: SHIPPED | OUTPATIENT
Start: 2023-12-28

## 2023-12-29 NOTE — PROGRESS NOTES
Yunier Vega is a 75year old female. Chief Complaint   Patient presents with    Follow - Up     F/U from last visit and pt would like a copy of past CT scan. HPI:   Patient with persistent right maxillary and ethmoid sinusitis despite multiple rounds of antibiotic treatment over the past 6 months. Culture grew a resistant Pseudomonas. Current Outpatient Medications   Medication Sig Dispense Refill    ciprofloxacin 500 MG Oral Tab Take 1 tablet (500 mg total) by mouth every 12 (twelve) hours. 20 tablet 0    ciprofloxacin 500 MG Oral Tab Take 1 tablet (500 mg total) by mouth every 12 (twelve) hours. 20 tablet 0    furosemide 40 MG Oral Tab Take 1 tablet (40 mg total) by mouth daily. 90 tablet 0    omeprazole 20 MG Oral Capsule Delayed Release Take 1 capsule (20 mg total) by mouth before breakfast.      insulin aspart 100 Units/mL Injection Solution TAKE 60 UNITS DAILY VIA INSULIN PUMP. fluconazole 150 MG Oral Tab       fluticasone propionate 50 MCG/ACT Nasal Suspension 1 spray by Nasal route 2 (two) times daily. 16 g 3    Calcipotriene 0.005 % External Solution APPLY TO AFFECTED AREA OR BODY TWICE A DAY      Fluticasone Propionate 50 MCG/ACT Nasal Suspension Two sprays each nostril once daily 1 Bottle 1    mometasone 0.1 % External Ointment       Cholecalciferol (VITAMIN D) 25 MCG (1000 UT) Oral Tab Take 25 mcg by mouth daily. ezetimibe 10 MG Oral Tab Take 1 tablet (10 mg total) by mouth. Insulin Infusion Pump Supplies (INSULIN PUMP SYRINGE RESERVOIR) Does not apply Misc as directed      Levothyroxine Sodium 125 MCG Oral Tab   4    simvastatin 20 MG Oral Tab   5    Albuterol Sulfate HFA (PROAIR HFA) 108 (90 Base) MCG/ACT Inhalation Aero Soln Inhale 2 puffs into the lungs every 4 (four) hours as needed for Wheezing or Shortness of Breath.  1 Inhaler 1    Metoprolol Succinate ER 25 MG Oral Tablet 24 Hr   4    triamcinolone acetonide 0.1 % External Ointment   1    Clobetasol Propionate 0.05 % External Solution   2    SANJU CONTOUR NEXT TEST In Vitro Strip   2    NOVOLOG 100 UNIT/ML Subcutaneous Solution   4    aspirin 81 MG Oral Chew Tab Chew  by mouth. Benazepril-Hydrochlorothiazide (LOTENSIN HCT) 10-12.5 MG Oral Tab Take 1 tablet by mouth daily. levoFLOXacin 500 MG Oral Tab Take 1 tablet (500 mg total) by mouth daily. (Patient not taking: Reported on 2023) 10 tablet 0    Naproxen Sodium 220 MG Oral Cap Take 220 mg by mouth 2 (two) times daily as needed. (Patient not taking: Reported on 2023)      Omeprazole 40 MG Oral Capsule Delayed Release Take 1 capsule (40 mg total) by mouth daily. (Patient not taking: Reported on 2023)        Past Medical History:   Diagnosis Date    Ankle fracture, lateral malleolus, closed     Breast cancer ([address] 2012    Breast cancer of upper-outer quadrant of left female breast ([address] 2012    Breast cancer, left ([address]     lumpectomy 2009    Breast cancer, left ([address]     left breast re-excision, left axillary SLNB    Breast tenderness in female 6/10/2018    CAD (coronary artery disease) 2012    Carotid artery disease ([address]     LEFT heart cath adn PCI 2010    Diabetes mellitus ([address] 2012    Encounter for fitting of portacath     venous access; portacath placement, 2009    Encounter for imaging to assess osteopenia 2016    Encounter for monitoring aromatase inhibitor therapy 6/10/2018    Essential hypertension     GERD (gastroesophageal reflux disease) 2012    H/O  section     x2    H/O vitrectomy     LEFT vitrectomy    Hip bursitis     left    Hyperlipidemia 2012    Hypertension 2012    Hypothyroidism 2012    Malignant neoplasm of upper-outer quadrant of left breast in female, estrogen receptor positive  ([address] 2012    Osteopenia of lumbar spine 6/10/2018    Psoriasis 2012    Thrombus     SVC thrombus;  Thrombolysis 2009    Trigger finger of both hands 12/10/2012    Type 1 diabetes mellitus ([facility]     Type 1 diabetes mellitus with hyperglycemia ([facility] 10/3/2021    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 ([facility] 0/44/8631      Social History:  Social History     Socioeconomic History    Marital status:    Tobacco Use    Smoking status: Never    Smokeless tobacco: Never   Vaping Use    Vaping Use: Never used   Substance and Sexual Activity    Alcohol use: Yes     Alcohol/week: 1.0 - 2.0 standard drink of alcohol     Types: 1 - 2 Glasses of wine per week     Comment: weekly    Drug use: No   Other Topics Concern    Caffeine Concern Yes     Comment: 4 cups coffee daily        REVIEW OF SYSTEMS:   GENERAL HEALTH: feels well otherwise  GENERAL : denies fever, chills, sweats, weight loss, weight gain  SKIN: denies any unusual skin lesions or rashes  RESPIRATORY: denies shortness of breath with exertion  NEURO: denies headaches    EXAM:   There were no vitals taken for this visit. System Details   Skin Inspection - Normal.   Constitutional Overall appearance - Normal.   Head/Face Facial features - Normal. Eyebrows - Normal. Skull - Normal.   Eyes Conjunctiva - Right: Normal, Left: Normal. Pupil - Right: Normal, Left: Normal.    Ears Inspection - Right: Normal, Left: Normal.   Canal - Right: Normal, Left: Normal.   TM - Right: Normal, Left: Normal.   Nasal External nose - Normal.   Nasal septum - Normal.  Turbinates -persistent purulence in the right middle meatus   Oral/Oropharynx Lips - Normal, Tonsils - Normal, Tongue - Normal    Neck Exam Inspection - Normal. Palpation - Normal. Parotid gland - Normal. Thyroid gland - Normal.   Lymph Detail Submental. Submandibular. Anterior cervical. Posterior cervical. Supraclavicular all without enlargement   Psychiatric Orientation - Oriented to time, place, person & situation. Appropriate mood and affect. Neurological Memory - Normal. Cranial nerves - Cranial nerves II through XII grossly intact. ASSESSMENT AND PLAN:   1. Chronic right maxillary sinusitis  Right much greater than left maxillary sinusitis with culture showing a resistant Pseudomonas. Will plan endoscopic right maxillary antrostomy with removal of tissue as well as right anterior ethmoidectomy and placement of a propel implant. Will also irrigate with gentamicin at the time of the procedure. Benefits explained to the patient who would like to proceed. She will need medical clearance. 2. Chronic ethmoidal sinusitis  So plan right anterior ethmoidectomy. 3. Chronic left maxillary sinusitis  Mild disease associated with periapical abscess. Patient given a copy of the CT scan so she can presented to her dentist.      The patient indicates understanding of these issues and agrees to the plan.       Dolores See MD  12/28/2023  10:29 PM

## 2023-12-29 NOTE — PATIENT INSTRUCTIONS
You have persistent purulent drainage from the right middle meatus. Culture showed this to be a resistant Pseudomonas. Refill of Cipro was sent to your pharmacy  We discussed surgical options and have agreed to do an scopic right maxillary antrostomy with removal of tissue and anterior ethmoidectomy. Will need to have clearance from a medical standpoint. Ines Dutton from our office will call to schedule.

## 2024-01-02 ENCOUNTER — TELEPHONE (OUTPATIENT)
Dept: OTOLARYNGOLOGY | Facility: CLINIC | Age: 74
End: 2024-01-02

## 2024-01-06 ENCOUNTER — LAB ENCOUNTER (OUTPATIENT)
Dept: LAB | Facility: HOSPITAL | Age: 74
End: 2024-01-06
Attending: INTERNAL MEDICINE
Payer: MEDICARE

## 2024-01-06 DIAGNOSIS — E10.59 TYPE 1 DIABETES MELLITUS WITH VASCULAR DISEASE (HCC): Primary | ICD-10-CM

## 2024-01-06 LAB
ALBUMIN SERPL-MCNC: 3.9 G/DL (ref 3.2–4.8)
ALBUMIN/GLOB SERPL: 1.4 {RATIO} (ref 1–2)
ALP LIVER SERPL-CCNC: 105 U/L
ALT SERPL-CCNC: 24 U/L
ANION GAP SERPL CALC-SCNC: 4 MMOL/L (ref 0–18)
AST SERPL-CCNC: 28 U/L (ref ?–34)
BILIRUB SERPL-MCNC: 0.6 MG/DL (ref 0.2–1.1)
BUN BLD-MCNC: 22 MG/DL (ref 9–23)
BUN/CREAT SERPL: 21.6 (ref 10–20)
CALCIUM BLD-MCNC: 9.4 MG/DL (ref 8.7–10.4)
CHLORIDE SERPL-SCNC: 101 MMOL/L (ref 98–112)
CHOLEST SERPL-MCNC: 177 MG/DL (ref ?–200)
CO2 SERPL-SCNC: 33 MMOL/L (ref 21–32)
CREAT BLD-MCNC: 1.02 MG/DL
EGFRCR SERPLBLD CKD-EPI 2021: 58 ML/MIN/1.73M2 (ref 60–?)
EST. AVERAGE GLUCOSE BLD GHB EST-MCNC: 237 MG/DL (ref 68–126)
FASTING PATIENT LIPID ANSWER: YES
FASTING STATUS PATIENT QL REPORTED: YES
GLOBULIN PLAS-MCNC: 2.7 G/DL (ref 2.8–4.4)
GLUCOSE BLD-MCNC: 101 MG/DL (ref 70–99)
HBA1C MFR BLD: 9.9 % (ref ?–5.7)
HDLC SERPL-MCNC: 70 MG/DL (ref 40–59)
LDLC SERPL CALC-MCNC: 90 MG/DL (ref ?–100)
NONHDLC SERPL-MCNC: 107 MG/DL (ref ?–130)
OSMOLALITY SERPL CALC.SUM OF ELEC: 289 MOSM/KG (ref 275–295)
POTASSIUM SERPL-SCNC: 3.7 MMOL/L (ref 3.5–5.1)
PROT SERPL-MCNC: 6.6 G/DL (ref 5.7–8.2)
SODIUM SERPL-SCNC: 138 MMOL/L (ref 136–145)
TRIGL SERPL-MCNC: 92 MG/DL (ref 30–149)
VLDLC SERPL CALC-MCNC: 15 MG/DL (ref 0–30)

## 2024-01-06 PROCEDURE — 80053 COMPREHEN METABOLIC PANEL: CPT

## 2024-01-06 PROCEDURE — 36415 COLL VENOUS BLD VENIPUNCTURE: CPT

## 2024-01-06 PROCEDURE — 83036 HEMOGLOBIN GLYCOSYLATED A1C: CPT

## 2024-01-06 PROCEDURE — 80061 LIPID PANEL: CPT

## 2024-02-07 DIAGNOSIS — J32.0 CHRONIC MAXILLARY SINUSITIS: Primary | ICD-10-CM

## 2024-02-07 DIAGNOSIS — J32.2 CHRONIC ETHMOIDAL SINUSITIS: ICD-10-CM

## 2024-02-12 ENCOUNTER — TELEPHONE (OUTPATIENT)
Dept: OTOLARYNGOLOGY | Facility: CLINIC | Age: 74
End: 2024-02-12

## 2024-02-13 RX ORDER — FUROSEMIDE 40 MG/1
40 TABLET ORAL DAILY
Qty: 90 TABLET | Refills: 0 | Status: SHIPPED | OUTPATIENT
Start: 2024-02-13

## 2024-02-19 ENCOUNTER — OPTICAL REFILL REQUEST (OUTPATIENT)
Dept: OPHTHALMOLOGY | Facility: CLINIC | Age: 74
End: 2024-02-19

## 2024-02-19 DIAGNOSIS — J32.0 CHRONIC MAXILLARY SINUSITIS: Primary | ICD-10-CM

## 2024-02-29 ENCOUNTER — OFFICE VISIT (OUTPATIENT)
Dept: INTERNAL MEDICINE CLINIC | Facility: CLINIC | Age: 74
End: 2024-02-29
Payer: MEDICARE

## 2024-02-29 VITALS
SYSTOLIC BLOOD PRESSURE: 102 MMHG | DIASTOLIC BLOOD PRESSURE: 72 MMHG | OXYGEN SATURATION: 99 % | HEART RATE: 70 BPM | WEIGHT: 188.38 LBS | HEIGHT: 62 IN | BODY MASS INDEX: 34.67 KG/M2

## 2024-02-29 DIAGNOSIS — Z01.818 PREOPERATIVE CLEARANCE: Primary | ICD-10-CM

## 2024-02-29 DIAGNOSIS — J32.0 CHRONIC MAXILLARY SINUSITIS: ICD-10-CM

## 2024-02-29 PROCEDURE — 99214 OFFICE O/P EST MOD 30 MIN: CPT | Performed by: INTERNAL MEDICINE

## 2024-02-29 RX ORDER — AZITHROMYCIN 250 MG/1
TABLET, FILM COATED ORAL SEE ADMIN INSTRUCTIONS
COMMUNITY
Start: 2024-02-23

## 2024-02-29 NOTE — PROGRESS NOTES
Subjective:   Katelynn Cr is a 73 year old female who presents for Pre-Op Exam (Surgery date: 03/06/2024)     Patient here for preop clearance for sinus surgery on the right sinus.   Had 2 opinions on this.  BS are suboptimal - olast A1c is 9.  Med hx  Type1 Dm , willie, and CAD hyperlipidemia   Surgeries - no problems with anaesthesia.    History/Other:    Chief Complaint Reviewed and Verified  Nursing Notes Reviewed and   Verified  Allergies Reviewed  Problem List Reviewed         Tobacco:  She has never smoked tobacco.    Current Outpatient Medications   Medication Sig Dispense Refill    azithromycin 250 MG Oral Tab Take by mouth See Admin Instructions. FOLLOW DIRECTIONS ON PACKAGE      furosemide 40 MG Oral Tab Take 1 tablet (40 mg total) by mouth daily. 90 tablet 0    ciprofloxacin 500 MG Oral Tab Take 1 tablet (500 mg total) by mouth every 12 (twelve) hours. (Patient not taking: Reported on 1/22/2024) 20 tablet 0    ciprofloxacin 500 MG Oral Tab Take 1 tablet (500 mg total) by mouth every 12 (twelve) hours. (Patient not taking: Reported on 1/22/2024) 20 tablet 0    omeprazole 20 MG Oral Capsule Delayed Release Take 1 capsule (20 mg total) by mouth before breakfast.      levoFLOXacin 500 MG Oral Tab Take 1 tablet (500 mg total) by mouth daily. 10 tablet 0    insulin aspart 100 Units/mL Injection Solution TAKE 60 UNITS DAILY VIA INSULIN PUMP.      fluconazole 150 MG Oral Tab       fluticasone propionate 50 MCG/ACT Nasal Suspension 1 spray by Nasal route 2 (two) times daily. 16 g 3    Calcipotriene 0.005 % External Solution APPLY TO AFFECTED AREA OR BODY TWICE A DAY      Fluticasone Propionate 50 MCG/ACT Nasal Suspension Two sprays each nostril once daily 1 Bottle 1    mometasone 0.1 % External Ointment       Cholecalciferol (VITAMIN D) 25 MCG (1000 UT) Oral Tab Take 25 mcg by mouth daily.      ezetimibe 10 MG Oral Tab Take 1 tablet (10 mg total) by mouth.      Insulin Infusion Pump Supplies (INSULIN PUMP  SYRINGE RESERVOIR) Does not apply Misc as directed      Levothyroxine Sodium 125 MCG Oral Tab   4    simvastatin 20 MG Oral Tab   5    Naproxen Sodium 220 MG Oral Cap Take 220 mg by mouth 2 (two) times daily as needed.      Albuterol Sulfate HFA (PROAIR HFA) 108 (90 Base) MCG/ACT Inhalation Aero Soln Inhale 2 puffs into the lungs every 4 (four) hours as needed for Wheezing or Shortness of Breath. 1 Inhaler 1    Metoprolol Succinate ER 25 MG Oral Tablet 24 Hr   4    triamcinolone acetonide 0.1 % External Ointment   1    Clobetasol Propionate 0.05 % External Solution  (Patient not taking: Reported on 1/22/2024)  2    Eve CONTOUR NEXT TEST In Vitro Strip   2    NOVOLOG 100 UNIT/ML Subcutaneous Solution   4    aspirin 81 MG Oral Chew Tab Chew  by mouth.      Benazepril-Hydrochlorothiazide (LOTENSIN HCT) 10-12.5 MG Oral Tab Take 1 tablet by mouth daily.      Omeprazole 40 MG Oral Capsule Delayed Release Take 1 capsule (40 mg total) by mouth daily.           Review of Systems:  Review of Systems   Constitutional:  Positive for fatigue. Negative for chills and fever.   HENT:  Positive for postnasal drip and sinus pressure.    Respiratory:  Negative for shortness of breath.    Cardiovascular:  Negative for chest pain, palpitations and leg swelling.   Endocrine:        Labile BS   Musculoskeletal:  Positive for arthralgias.   Neurological:  Negative for numbness.   Psychiatric/Behavioral: Negative.           Objective:   /72   Pulse 70   Ht 5' 2\" (1.575 m)   Wt 188 lb 6.4 oz (85.5 kg)   SpO2 99%   BMI 34.46 kg/m²  Estimated body mass index is 34.46 kg/m² as calculated from the following:    Height as of this encounter: 5' 2\" (1.575 m).    Weight as of this encounter: 188 lb 6.4 oz (85.5 kg).  Physical Exam  Constitutional:       Appearance: Normal appearance. She is obese.   HENT:      Head: Normocephalic and atraumatic.      Right Ear: Ear canal normal.      Left Ear: Ear canal normal.      Mouth/Throat:       Comments: Pnd  Eyes:      General: No scleral icterus.     Pupils: Pupils are equal, round, and reactive to light.   Cardiovascular:      Rate and Rhythm: Normal rate and regular rhythm.   Pulmonary:      Effort: Pulmonary effort is normal.      Breath sounds: Normal breath sounds.   Abdominal:      Palpations: Abdomen is soft.      Tenderness: There is no abdominal tenderness.   Musculoskeletal:      Cervical back: Neck supple.      Right lower leg: No edema.      Left lower leg: No edema.   Lymphadenopathy:      Cervical: No cervical adenopathy.   Skin:     Findings: No lesion.      Comments: Well healed scar on left cheek   Neurological:      Mental Status: She is alert. Mental status is at baseline.   Psychiatric:         Thought Content: Thought content normal.           Assessment & Plan:   1. Preoperative clearance (Primary) - patient is medically clear for sinus surgery by Dr. Bocanegra  2. Chronic maxillary sinusitis as above        No follow-ups on file.    Alicia Haines MD, 2/29/2024, 2:10 PM

## 2024-03-01 DIAGNOSIS — J32.0 CHRONIC MAXILLARY SINUSITIS: Primary | ICD-10-CM

## 2024-03-03 NOTE — H&P
[facility]  part of [facility]    History and Physical    Katelynn Cr Patient Status:  Hospital Outpatient Surgery    3/21/1950 MRN [id number]   Location [facility] OPERATING ROOM Attending No Bocanegra MD   Hosp Day # 0 PCP Alicia Haines MD     Date:  3/2/2024  Date of Admission:  (Not on file)    History provided by:patient  HPI:   No chief complaint on file.    HPI  Patient with persistent right maxillary and ethmoid sinusitis despite multiple rounds of antibiotics.  Grew pseudomonas    History     Past Medical History:   Diagnosis Date    Ankle fracture, lateral malleolus, closed     Asthma (HCC)     Breast cancer (HCC) 2012    Breast cancer of upper-outer quadrant of left female breast (HCC) 2012    Breast cancer, left ([facility])     lumpectomy 2009    Breast cancer, left (HCC)     left breast re-excision, left axillary SLNB    Breast tenderness in female 06/10/2018    CAD (coronary artery disease) 2012    Carotid artery disease ([facility])     LEFT heart cath adn PCI 2010    Cataract     Deep vein thrombosis (HCC)     R arm    Diabetes mellitus (HCC) 2012    Encounter for fitting of portacath     venous access; portacath placement, 2009    Encounter for imaging to assess osteopenia 2016    Encounter for monitoring aromatase inhibitor therapy 06/10/2018    Essential hypertension     GERD (gastroesophageal reflux disease) 2012    H/O  section     x2    H/O vitrectomy     LEFT vitrectomy    Hip bursitis     left    Hyperlipidemia 2012    Hypertension 2012    Hypothyroidism 2012    Malignant neoplasm of upper-outer quadrant of left breast in female, estrogen receptor positive (HCC) 2012    Neuropathy     B/L feet    Osteoarthritis     B/L shoulders    Osteopenia of lumbar spine 06/10/2018    Personal history of antineoplastic chemotherapy     Psoriasis 2012    Sleep apnea      Squamous cell cancer of skin of left cheek 2024    Thrombus     SVC thrombus; Thrombolysis 2009    Trigger finger of both hands 12/10/2012    Type 1 diabetes mellitus (HCC)     Type 1 diabetes mellitus with hyperglycemia (HCC) 10/03/2021    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) 2009    Visual impairment     glasses, contacts     Past Surgical History:   Procedure Laterality Date    BREAST BIOPSY            CATARACT EXTRACTION Left 2018    Dr. Ellis    CATARACT EXTRACTION W/  INTRAOCULAR LENS IMPLANT Right 2019    Dr. Ellis    CHEMOTHERAPY      CHOLECYSTECTOMY      LUMPECTOMY LEFT Left 2009    NEEDLE BIOPSY RIGHT      NEEDLE CORE BIOPSY, BREAST ([facility]) Right 10/07/2009    RIGHT breast nodules; mammatome core needle biopsies;     RADIATION LEFT       Family History   Problem Relation Age of Onset    Heart Disease Mother         CAD - Premature    Heart Disease Father         (cause of death)    Breast Cancer Self 60     Social History:  Social History     Socioeconomic History    Marital status:    Tobacco Use    Smoking status: Never    Smokeless tobacco: Never   Vaping Use    Vaping Use: Never used   Substance and Sexual Activity    Alcohol use: Yes     Alcohol/week: 1.0 - 2.0 standard drink of alcohol     Types: 1 - 2 Glasses of wine per week     Comment: weekly    Drug use: No   Other Topics Concern    Caffeine Concern Yes     Comment: 4 cups coffee daily     Allergies/Medications:   Allergies:   Allergies   Allergen Reactions    Penicillins HIVES     Other reaction(s): PENICILLINS  Other reaction(s): Unknown    Clindamycin     Dust Mite Extract OTHER (SEE COMMENTS)     Nasal congestion    Sulfa Antibiotics     Rosuvastatin RASH     Medications: lasix, omeprazole, insulin, flonase, vitamin D, ezetimibe, synthroid, albuterol, metoprolol, lotensin hydrochlorothiazide, naproxen    Review of Systems:   Review of Systems  Right sinus pressure  and drainage  Physical Exam:   Vital Signs:  Height 5' 2\" (1.575 m), weight 183 lb (83 kg), not currently breastfeeding.  Physical Exam  Ears: normal  Nose: persistent purulence in the right middle meatus  Mouth: normal  Neck: no adenopathy  Lungs: clear  Heart: regular rate and rhythm.  No murmurs  Cervical Papanicolaou to be done in MD's office    Results:     Lab Results   Component Value Date    WBC 7.1 04/08/2019    HGB 12.8 04/08/2019    HCT 39.8 04/08/2019    .0 04/08/2019    CREATSERUM 1.02 01/06/2024    BUN 22 01/06/2024     01/06/2024    K 3.7 01/06/2024     01/06/2024    CO2 33.0 (H) 01/06/2024     (H) 01/06/2024    CA 9.4 01/06/2024    ALB 3.9 01/06/2024    ALKPHO 105 01/06/2024    BILT 0.6 01/06/2024    TP 6.6 01/06/2024    AST 28 01/06/2024    ALT 24 01/06/2024    T4F 1.6 10/07/2023    TSH 1.350 10/07/2023    ESRML 23 11/28/2022    CRP 0.50 (H) 11/28/2022    CK 86 10/09/2018    POCGLU 239 08/21/2019     No results found.        Assessment/Plan:     Impression:Right much greater than left maxillary sinusitis with culture showing a resistant Pseudomonas. Will plan endoscopic right maxillary antrostomy with removal of tissue as well as right anterior ethmoidectomy and placement of a propel implant. Will also irrigate with gentamicin at the time of the procedure.   Plan: endoscopic right maxillary antrostomy with tissue removal and endoscopic right anterior ethmoidectomy with irrigation of gentamicin and placement of propel implant.            No Bocanegra MD  3/2/2024

## 2024-03-04 NOTE — DISCHARGE INSTRUCTIONS
HOME INSTRUCTIONS  Follow up in my [place] office tomorrow 2 pm.  Eat and take pain medication prior to arrival  Tylenol or tramadol for pain  No aspirin, advil, aleve, ibuprofen, or motrin  Change mustache dressing as needed  Call with any problems.    AMBSURG HOME CARE INSTRUCTIONS: POST-OP ANESTHESIA  The medication that you received for sedation or general anesthesia can last up to 24 hours. Your judgment and reflexes may be altered, even if you feel like your normal self.      We Recommend:   Do not drive any motor vehicle or bicycle   Avoid mowing the lawn, playing sports, or working with power tools/applicances (power saws, electric knives or mixers)   That you have someone stay with you on your first night home   Do not drink alcohol or take sleeping pills or tranquilizers   Do not sign legal documents within 24 hours of your procedure   If you had a nerve block for your surgery, take extra care not to put any pressure on your arm or hand for 24 hours    It is normal:  For you to have a sore throat if you had a breathing tube during surgery (while you were asleep!). The sore throat should get better within 48 hours. You can gargle with warm salt water (1/2 tsp in 4 oz warm water) or use a throat lozenge for comfort  To feel muscle aches or soreness especially in the abdomen, chest or neck. The achy feeling should go away in the next 24 hours  To feel weak, sleepy or \"wiped out\". Your should start feeling better in the next 24 hours.   To experience mild discomforts such as sore lip or tongue, headache, cramps, gas pains or a bloated feeling in your abdomen.   To experience mild back pain or soreness for a day or two if you had spinal or epidural anesthesia.   If you had laparoscopic surgery, to feel shoulder pain or discomfort on the day of surgery.   For some patients to have nausea after surgery/anesthesia    If you feel nausea or experience vomiting:   Try to move around less.   Eat less than usual or  drink only liquids until the next morning   Nausea should resolve in about 24 hours    If you have a problem when you are at home:    Call your surgeons office   Discharge Instructions: After Your Surgery  You’ve just had surgery. During surgery, you were given medicine called anesthesia to keep you relaxed and free of pain. After surgery, you may have some pain or nausea. This is common. Here are some tips for feeling better and getting well after surgery.   Going home  Your healthcare provider will show you how to take care of yourself when you go home. They'll also answer your questions. Have an adult family member or friend drive you home. For the first 24 hours after your surgery:   Don't drive or use heavy equipment.  Don't make important decisions or sign legal papers.  Take medicines as directed.  Don't drink alcohol.  Have someone stay with you, if needed. They can watch for problems and help keep you safe.  Be sure to go to all follow-up visits with your healthcare provider. And rest after your surgery for as long as your provider tells you to.   Coping with pain  If you have pain after surgery, pain medicine will help you feel better. Take it as directed, before pain becomes severe. Also, ask your healthcare provider or pharmacist about other ways to control pain. This might be with heat, ice, or relaxation. And follow any other instructions your surgeon or nurse gives you.      Stay on schedule with your medicine.     Tips for taking pain medicine  To get the best relief possible, remember these points:   Pain medicines can upset your stomach. Taking them with a little food may help.  Most pain relievers taken by mouth need at least 20 to 30 minutes to start to work.  Don't wait till your pain becomes severe before you take your medicine. Try to time your medicine so that you can take it before starting an activity. This might be before you get dressed, go for a walk, or sit down for  dinner.  Constipation is a common side effect of some pain medicines. Call your healthcare provider before taking any medicines such as laxatives or stool softeners to help ease constipation. Also ask if you should skip any foods. Drinking lots of fluids and eating foods such as fruits and vegetables that are high in fiber can also help. Remember, don't take laxatives unless your surgeon has prescribed them.  Drinking alcohol and taking pain medicine can cause dizziness and slow your breathing. It can even be deadly. Don't drink alcohol while taking pain medicine.  Pain medicine can make you react more slowly to things. Don't drive or run machinery while taking pain medicine.  Your healthcare provider may tell you to take acetaminophen to help ease your pain. Ask them how much you're supposed to take each day. Acetaminophen or other pain relievers may interact with your prescription medicines or other over-the-counter (OTC) medicines. Some prescription medicines have acetaminophen and other ingredients in them. Using both prescription and OTC acetaminophen for pain can cause you to accidentally overdose. Read the labels on your OTC medicines with care. This will help you to clearly know the list of ingredients, how much to take, and any warnings. It may also help you not take too much acetaminophen. If you have questions or don't understand the information, ask your pharmacist or healthcare provider to explain it to you before you take the OTC medicine.   Managing nausea  Some people have an upset stomach (nausea) after surgery. This is often because of anesthesia, pain, or pain medicine, less movement of food in the stomach, or the stress of surgery. These tips will help you handle nausea and eat healthy foods as you get better. If you were on a special food plan before surgery, ask your healthcare provider if you should follow it while you get better. Check with your provider on how your eating should progress. It  may depend on the surgery you had. These general tips may help:   Don't push yourself to eat. Your body will tell you when to eat and how much.  Start off with clear liquids and soup. They're easier to digest.  Next try semi-solid foods as you feel ready. These include mashed potatoes, applesauce, and gelatin.  Slowly move to solid foods. Don’t eat fatty, rich, or spicy foods at first.  Don't force yourself to have 3 large meals a day. Instead eat smaller amounts more often.  Take pain medicines with a small amount of solid food, such as crackers or toast. This helps prevent nausea.  When to call your healthcare provider  Call your healthcare provider right away if you have any of these:   You still have too much pain, or the pain gets worse, after taking the medicine. The medicine may not be strong enough. Or there may be a complication from the surgery.  You feel too sleepy, dizzy, or groggy. The medicine may be too strong.  Side effects such as nausea or vomiting. Your healthcare provider may advise taking other medicines to .  Skin changes such as rash, itching, or hives. This may mean you have an allergic reaction. Your provider may advise taking other medicines.  The incision looks different (for instance, part of it opens up).  Bleeding or fluid leaking from the incision site, and weren't told to expect that.  Fever of 100.4°F (38°C) or higher, or as directed by your provider.  Call 911  Call 911 right away if you have:   Trouble breathing  Facial swelling    If you have obstructive sleep apnea   You were given anesthesia medicine during surgery to keep you comfortable and free of pain. After surgery, you may have more apnea spells because of this medicine and other medicines you were given. The spells may last longer than normal.    At home:  Keep using the continuous positive airway pressure (CPAP) device when you sleep. Unless your healthcare provider tells you not to, use it when you sleep, day or night.  CPAP is a common device used to treat obstructive sleep apnea.  Talk with your provider before taking any pain medicine, muscle relaxants, or sedatives. Your provider will tell you about the possible dangers of taking these medicines.  Contact your provider if your sleeping changes a lot even when taking medicines as directed.  [employer] last reviewed this educational content on 10/1/2021  © 5419-4639 [employer]. All rights reserved. This information is not intended as a substitute for professional medical care. Always follow your healthcare professional's instructions.      Follow up in my [place] office tomorrow 2 pm.  Eat and take pain medication prior to arrival  Tylenol or tramadol for pain  No aspirin, advil, aleve, ibuprofen, or motrin  Change mustache dressing as needed  Call with any problems.

## 2024-03-06 ENCOUNTER — HOSPITAL ENCOUNTER (OUTPATIENT)
Facility: HOSPITAL | Age: 74
Setting detail: HOSPITAL OUTPATIENT SURGERY
Discharge: HOME | End: 2024-03-06
Attending: SPECIALIST | Admitting: SPECIALIST
Payer: MEDICARE

## 2024-03-06 ENCOUNTER — ANESTHESIA (OUTPATIENT)
Dept: SURGERY | Facility: HOSPITAL | Age: 74
End: 2024-03-06
Payer: MEDICARE

## 2024-03-06 ENCOUNTER — ANESTHESIA EVENT (OUTPATIENT)
Dept: SURGERY | Facility: HOSPITAL | Age: 74
End: 2024-03-06
Payer: MEDICARE

## 2024-03-06 VITALS
TEMPERATURE: 97 F | WEIGHT: 185 LBS | SYSTOLIC BLOOD PRESSURE: 136 MMHG | HEART RATE: 73 BPM | DIASTOLIC BLOOD PRESSURE: 55 MMHG | HEIGHT: 62 IN | RESPIRATION RATE: 12 BRPM | OXYGEN SATURATION: 96 % | BODY MASS INDEX: 34.04 KG/M2

## 2024-03-06 DIAGNOSIS — J32.0 CHRONIC MAXILLARY SINUSITIS: ICD-10-CM

## 2024-03-06 LAB
GLUCOSE BLDC GLUCOMTR-MCNC: 152 MG/DL (ref 70–99)
GLUCOSE BLDC GLUCOMTR-MCNC: 156 MG/DL (ref 70–99)

## 2024-03-06 PROCEDURE — 82962 GLUCOSE BLOOD TEST: CPT

## 2024-03-06 PROCEDURE — 88305 TISSUE EXAM BY PATHOLOGIST: CPT | Performed by: SPECIALIST

## 2024-03-06 PROCEDURE — 099Q8ZZ DRAINAGE OF RIGHT MAXILLARY SINUS, VIA NATURAL OR ARTIFICIAL OPENING ENDOSCOPIC: ICD-10-PCS | Performed by: SPECIALIST

## 2024-03-06 PROCEDURE — 88311 DECALCIFY TISSUE: CPT | Performed by: SPECIALIST

## 2024-03-06 PROCEDURE — 09BU8ZZ EXCISION OF RIGHT ETHMOID SINUS, VIA NATURAL OR ARTIFICIAL OPENING ENDOSCOPIC: ICD-10-PCS | Performed by: SPECIALIST

## 2024-03-06 DEVICE — PROPEL CONTOUR SINUS IMPLANT
Type: IMPLANTABLE DEVICE | Site: NOSE | Status: FUNCTIONAL
Brand: PROPEL CONTOUR

## 2024-03-06 RX ORDER — MORPHINE SULFATE 4 MG/ML
2 INJECTION, SOLUTION INTRAMUSCULAR; INTRAVENOUS EVERY 10 MIN PRN
Status: DISCONTINUED | OUTPATIENT
Start: 2024-03-06 | End: 2024-03-06

## 2024-03-06 RX ORDER — HYDROMORPHONE HYDROCHLORIDE 1 MG/ML
0.4 INJECTION, SOLUTION INTRAMUSCULAR; INTRAVENOUS; SUBCUTANEOUS EVERY 5 MIN PRN
Status: DISCONTINUED | OUTPATIENT
Start: 2024-03-06 | End: 2024-03-06

## 2024-03-06 RX ORDER — SODIUM CHLORIDE, SODIUM LACTATE, POTASSIUM CHLORIDE, CALCIUM CHLORIDE 600; 310; 30; 20 MG/100ML; MG/100ML; MG/100ML; MG/100ML
INJECTION, SOLUTION INTRAVENOUS CONTINUOUS
Status: DISCONTINUED | OUTPATIENT
Start: 2024-03-06 | End: 2024-03-06

## 2024-03-06 RX ORDER — ACETAMINOPHEN 500 MG
1000 TABLET ORAL ONCE AS NEEDED
Status: DISCONTINUED | OUTPATIENT
Start: 2024-03-06 | End: 2024-03-06

## 2024-03-06 RX ORDER — NICOTINE POLACRILEX 4 MG
30 LOZENGE BUCCAL
Status: DISCONTINUED | OUTPATIENT
Start: 2024-03-06 | End: 2024-03-06

## 2024-03-06 RX ORDER — GENTAMICIN SULFATE 40 MG/ML
INJECTION, SOLUTION INTRAMUSCULAR; INTRAVENOUS AS NEEDED
Status: DISCONTINUED | OUTPATIENT
Start: 2024-03-06 | End: 2024-03-06 | Stop reason: HOSPADM

## 2024-03-06 RX ORDER — METOCLOPRAMIDE 10 MG/1
10 TABLET ORAL ONCE
Status: COMPLETED | OUTPATIENT
Start: 2024-03-06 | End: 2024-03-06

## 2024-03-06 RX ORDER — ACETAMINOPHEN 500 MG
1000 TABLET ORAL ONCE
Status: COMPLETED | OUTPATIENT
Start: 2024-03-06 | End: 2024-03-06

## 2024-03-06 RX ORDER — DEXTROSE MONOHYDRATE 25 G/50ML
50 INJECTION, SOLUTION INTRAVENOUS
Status: DISCONTINUED | OUTPATIENT
Start: 2024-03-06 | End: 2024-03-06

## 2024-03-06 RX ORDER — LIDOCAINE HYDROCHLORIDE AND EPINEPHRINE 10; 10 MG/ML; UG/ML
INJECTION, SOLUTION INFILTRATION; PERINEURAL AS NEEDED
Status: DISCONTINUED | OUTPATIENT
Start: 2024-03-06 | End: 2024-03-06 | Stop reason: HOSPADM

## 2024-03-06 RX ORDER — FAMOTIDINE 10 MG/ML
20 INJECTION, SOLUTION INTRAVENOUS ONCE
Status: DISCONTINUED | OUTPATIENT
Start: 2024-03-06 | End: 2024-03-06 | Stop reason: HOSPADM

## 2024-03-06 RX ORDER — NALOXONE HYDROCHLORIDE 0.4 MG/ML
80 INJECTION, SOLUTION INTRAMUSCULAR; INTRAVENOUS; SUBCUTANEOUS AS NEEDED
Status: DISCONTINUED | OUTPATIENT
Start: 2024-03-06 | End: 2024-03-06

## 2024-03-06 RX ORDER — LIDOCAINE HYDROCHLORIDE 10 MG/ML
INJECTION, SOLUTION EPIDURAL; INFILTRATION; INTRACAUDAL; PERINEURAL AS NEEDED
Status: DISCONTINUED | OUTPATIENT
Start: 2024-03-06 | End: 2024-03-06 | Stop reason: SURG

## 2024-03-06 RX ORDER — METOCLOPRAMIDE HYDROCHLORIDE 5 MG/ML
10 INJECTION INTRAMUSCULAR; INTRAVENOUS ONCE
Status: COMPLETED | OUTPATIENT
Start: 2024-03-06 | End: 2024-03-06

## 2024-03-06 RX ORDER — ONDANSETRON 2 MG/ML
4 INJECTION INTRAMUSCULAR; INTRAVENOUS EVERY 6 HOURS PRN
Status: DISCONTINUED | OUTPATIENT
Start: 2024-03-06 | End: 2024-03-06

## 2024-03-06 RX ORDER — MORPHINE SULFATE 10 MG/ML
6 INJECTION, SOLUTION INTRAMUSCULAR; INTRAVENOUS EVERY 10 MIN PRN
Status: DISCONTINUED | OUTPATIENT
Start: 2024-03-06 | End: 2024-03-06

## 2024-03-06 RX ORDER — NICOTINE POLACRILEX 4 MG
15 LOZENGE BUCCAL
Status: DISCONTINUED | OUTPATIENT
Start: 2024-03-06 | End: 2024-03-06

## 2024-03-06 RX ORDER — HYDROMORPHONE HYDROCHLORIDE 1 MG/ML
0.6 INJECTION, SOLUTION INTRAMUSCULAR; INTRAVENOUS; SUBCUTANEOUS EVERY 5 MIN PRN
Status: DISCONTINUED | OUTPATIENT
Start: 2024-03-06 | End: 2024-03-06

## 2024-03-06 RX ORDER — METOCLOPRAMIDE HYDROCHLORIDE 5 MG/ML
10 INJECTION INTRAMUSCULAR; INTRAVENOUS EVERY 8 HOURS PRN
Status: DISCONTINUED | OUTPATIENT
Start: 2024-03-06 | End: 2024-03-06

## 2024-03-06 RX ORDER — HYDROMORPHONE HYDROCHLORIDE 1 MG/ML
0.2 INJECTION, SOLUTION INTRAMUSCULAR; INTRAVENOUS; SUBCUTANEOUS EVERY 5 MIN PRN
Status: DISCONTINUED | OUTPATIENT
Start: 2024-03-06 | End: 2024-03-06

## 2024-03-06 RX ORDER — FAMOTIDINE 20 MG/1
20 TABLET, FILM COATED ORAL ONCE
Status: DISCONTINUED | OUTPATIENT
Start: 2024-03-06 | End: 2024-03-06 | Stop reason: HOSPADM

## 2024-03-06 RX ORDER — DOXYCYCLINE HYCLATE 100 MG/1
100 CAPSULE ORAL 2 TIMES DAILY
Qty: 10 CAPSULE | Refills: 0 | Status: SHIPPED | OUTPATIENT
Start: 2024-03-06

## 2024-03-06 RX ORDER — ROCURONIUM BROMIDE 10 MG/ML
INJECTION, SOLUTION INTRAVENOUS AS NEEDED
Status: DISCONTINUED | OUTPATIENT
Start: 2024-03-06 | End: 2024-03-06 | Stop reason: SURG

## 2024-03-06 RX ORDER — TRAMADOL HYDROCHLORIDE 50 MG/1
TABLET ORAL EVERY 4 HOURS PRN
Qty: 12 TABLET | Refills: 0 | Status: SHIPPED | OUTPATIENT
Start: 2024-03-06

## 2024-03-06 RX ORDER — MORPHINE SULFATE 4 MG/ML
4 INJECTION, SOLUTION INTRAMUSCULAR; INTRAVENOUS EVERY 10 MIN PRN
Status: DISCONTINUED | OUTPATIENT
Start: 2024-03-06 | End: 2024-03-06

## 2024-03-06 RX ADMIN — ROCURONIUM BROMIDE 10 MG: 10 INJECTION, SOLUTION INTRAVENOUS at 10:15:00

## 2024-03-06 RX ADMIN — SODIUM CHLORIDE, SODIUM LACTATE, POTASSIUM CHLORIDE, CALCIUM CHLORIDE: 600; 310; 30; 20 INJECTION, SOLUTION INTRAVENOUS at 11:31:00

## 2024-03-06 RX ADMIN — LIDOCAINE HYDROCHLORIDE 50 MG: 10 INJECTION, SOLUTION EPIDURAL; INFILTRATION; INTRACAUDAL; PERINEURAL at 10:15:00

## 2024-03-06 NOTE — INTERVAL H&P NOTE
Pre-op Diagnosis: Chronic maxillary sinusitis [J32.0]    The above referenced H&P was reviewed by No Bocanegra MD on 3/6/2024, the patient was examined and no significant changes have occurred in the patient's condition since the H&P was performed.  I discussed with the patient and/or legal representative the potential benefits, risks and side effects of this procedure; the likelihood of the patient achieving goals; and potential problems that might occur during recuperation.  I discussed reasonable alternatives to the procedure, including risks, benefits and side effects related to the alternatives and risks related to not receiving this procedure.  We will proceed with procedure as planned.

## 2024-03-06 NOTE — BRIEF OP NOTE
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

## 2024-03-06 NOTE — ANESTHESIA PREPROCEDURE EVALUATION
Anesthesia PreOp Note    HPI:     Katelynn Cr is a 73 year old female who presents for preoperative consultation requested by: No Bocanegra MD    Date of Surgery: 3/6/2024    Procedure(s):  Endoscopic right maxillary antrostomy with tissue removal, Endoscopic right anterior ethmodectomy and placement of propel implant with possible gentamicin irrigation  Indication: Chronic maxillary sinusitis [J32.0]    Relevant Problems   No relevant active problems       NPO:  Last Liquid Consumption Date: 03/05/24  Last Liquid Consumption Time: 2300  Last Solid Consumption Date: 03/05/24  Last Solid Consumption Time: 2030  Last Liquid Consumption Date: 03/05/24          History Review:  Patient Active Problem List    Diagnosis Date Noted    Labral tear of long head of left biceps tendon 12/07/2023    NANCY on CPAP 08/30/2023    Acute pain of right shoulder 08/07/2023    Impingement syndrome of right shoulder region 08/07/2023    Arthritis of shoulder region, right 08/07/2023    Rotator cuff tendonitis, right 08/07/2023    Arthritis of shoulder region, left 07/06/2023    Impingement of left shoulder 07/06/2023    Left shoulder pain 07/06/2023    Type 1 diabetes mellitus with hyperglycemia (HCC) 10/03/2021    Encounter for screening mammogram for malignant neoplasm of breast 07/30/2019    Osteopenia determined by x-ray 07/30/2019    Atherosclerosis of coronary artery 03/26/2019    Hypertension 03/26/2019    Type 1 diabetes mellitus with peripheral circulatory complications (HCC) 03/26/2019    Contusion of left wrist 02/25/2019    Wrist pain, acute, left 02/19/2019    Spinal stenosis of lumbar region with neurogenic claudication 10/09/2018    Sciatica of left side 09/18/2018    Trochanteric bursitis of left hip 09/18/2018    Breast tenderness in female 06/10/2018    Encounter for monitoring aromatase inhibitor therapy 06/10/2018    Osteopenia of lumbar spine 06/10/2018    Spondylolisthesis of lumbar region 07/31/2016     Myopia, bilateral 2014    Malignant neoplasm of upper-outer quadrant of left breast in female, estrogen receptor positive (HCC) 2012    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 ([facility]) 2009    Disorder of bone and cartilage 2008    Essential hypertension, benign 2006    Mixed hyperlipidemia 2006    Type I (juvenile type) diabetes mellitus with ophthalmic manifestations, not stated as uncontrolled(250.51) 2006    Hypothyroidism 2006       Past Medical History:   Diagnosis Date    Ankle fracture, lateral malleolus, closed     Asthma (HCC)     Breast cancer (HCC) 2012    Breast cancer of upper-outer quadrant of left female breast ([facility]) 2012    Breast cancer, left ([facility])     lumpectomy 2009    Breast cancer, left ([facility])     left breast re-excision, left axillary SLNB    Breast tenderness in female 06/10/2018    CAD (coronary artery disease) 2012    Carotid artery disease ([facility])     LEFT heart cath adn PCI 2010    Cataract     Deep vein thrombosis ([facility])     R arm    Diabetes mellitus ([facility]) 2012    Encounter for fitting of portacath     venous access; portacath placement, 2009    Encounter for imaging to assess osteopenia 2016    Encounter for monitoring aromatase inhibitor therapy 06/10/2018    Essential hypertension     GERD (gastroesophageal reflux disease) 2012    H/O  section     x2    H/O vitrectomy     LEFT vitrectomy    Hip bursitis     left    Hyperlipidemia 2012    Hypertension 2012    Hypothyroidism 2012    Malignant neoplasm of upper-outer quadrant of left breast in female, estrogen receptor positive (HCC) 2012    Neuropathy     B/L feet    Osteoarthritis     B/L shoulders    Osteopenia of lumbar spine 06/10/2018    Personal history of antineoplastic chemotherapy     Psoriasis 2012    Sleep apnea     Squamous cell cancer of skin of left cheek  2024    Thrombus     SVC thrombus; Thrombolysis 2009    Trigger finger of both hands 12/10/2012    Type 1 diabetes mellitus (HCC)     Type 1 diabetes mellitus with hyperglycemia (HCC) 10/03/2021    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) 2009    Visual impairment     glasses, contacts       Past Surgical History:   Procedure Laterality Date    BREAST BIOPSY  1997          CATARACT EXTRACTION Left 2018    Dr. Ellis    CATARACT EXTRACTION W/  INTRAOCULAR LENS IMPLANT Right 2019    Dr. Ellis    CHEMOTHERAPY      CHOLECYSTECTOMY      LUMPECTOMY LEFT Left 2009    NEEDLE BIOPSY RIGHT      NEEDLE CORE BIOPSY, BREAST ([facility]) Right 10/07/2009    RIGHT breast nodules; mammatome core needle biopsies;     RADIATION LEFT         Medications Prior to Admission   Medication Sig Dispense Refill Last Dose    azithromycin 250 MG Oral Tab Take by mouth See Admin Instructions. FOLLOW DIRECTIONS ON PACKAGE       furosemide 40 MG Oral Tab Take 1 tablet (40 mg total) by mouth daily. 90 tablet 0 3/5/2024    omeprazole 20 MG Oral Capsule Delayed Release Take 1 capsule (20 mg total) by mouth before breakfast.   3/6/2024    insulin aspart 100 Units/mL Injection Solution TAKE 60 UNITS DAILY VIA INSULIN PUMP.       fluticasone propionate 50 MCG/ACT Nasal Suspension 1 spray by Nasal route 2 (two) times daily. 16 g 3     Calcipotriene 0.005 % External Solution APPLY TO AFFECTED AREA OR BODY TWICE A DAY       Fluticasone Propionate 50 MCG/ACT Nasal Suspension Two sprays each nostril once daily 1 Bottle 1 3/5/2024    mometasone 0.1 % External Ointment        Cholecalciferol (VITAMIN D) 25 MCG (1000 UT) Oral Tab Take 25 mcg by mouth daily.   3/5/2024    ezetimibe 10 MG Oral Tab Take 1 tablet (10 mg total) by mouth.   3/5/2024    Levothyroxine Sodium 125 MCG Oral Tab   4 3/6/2024    simvastatin 20 MG Oral Tab   5 3/5/2024    Naproxen Sodium 220 MG Oral Cap Take 220 mg by mouth 2 (two) times  daily as needed.   Past Week    Metoprolol Succinate ER 25 MG Oral Tablet 24 Hr   4 3/6/2024    triamcinolone acetonide 0.1 % External Ointment   1     NOVOLOG 100 UNIT/ML Subcutaneous Solution   4     aspirin 81 MG Oral Chew Tab Chew  by mouth.   Past Week    Benazepril-Hydrochlorothiazide (LOTENSIN HCT) 10-12.5 MG Oral Tab Take 1 tablet by mouth daily.   3/5/2024    Insulin Infusion Pump Supplies (INSULIN PUMP SYRINGE RESERVOIR) Does not apply Misc as directed       Albuterol Sulfate HFA (PROAIR HFA) 108 (90 Base) MCG/ACT Inhalation Aero Soln Inhale 2 puffs into the lungs every 4 (four) hours as needed for Wheezing or Shortness of Breath. 1 Inhaler 1 More than a month    Clobetasol Propionate 0.05 % External Solution  (Patient not taking: Reported on 1/22/2024)  2     SANJU CONTOUR NEXT TEST In Vitro Strip   2     Omeprazole 40 MG Oral Capsule Delayed Release Take 1 capsule (40 mg total) by mouth daily.        Current Facility-Administered Medications Ordered in Epic   Medication Dose Route Frequency Provider Last Rate Last Admin    lactated ringers infusion   Intravenous Continuous No Bocanegra MD        acetaminophen (Tylenol Extra Strength) tab 1,000 mg  1,000 mg Oral Once No Bocanegar MD        metoprolol tartrate (Lopressor) tab 25 mg  25 mg Oral Once PRN No Bocanegra MD        famotidine (Pepcid) tab 20 mg  20 mg Oral Once No Bocanegra MD        Or    famotidine (Pepcid) 20 mg/2mL injection 20 mg  20 mg Intravenous Once No Bocanegra MD        metoclopramide (Reglan) tab 10 mg  10 mg Oral Once No Bocanegra MD        Or    metoclopramide (Reglan) 5 mg/mL injection 10 mg  10 mg Intravenous Once No Bocanegra MD         No current [facility]-ordered outpatient medications on file.       Allergies   Allergen Reactions    Penicillins HIVES     Other reaction(s): PENICILLINS  Other reaction(s): Unknown    Clindamycin     Dust Mite Extract OTHER (SEE COMMENTS)     Nasal congestion    Sulfa Antibiotics      Rosuvastatin RASH       Family History   Problem Relation Age of Onset    Heart Disease Mother         CAD - Premature    Heart Disease Father         (cause of death)    Breast Cancer Self 60     Social History     Socioeconomic History    Marital status:    Tobacco Use    Smoking status: Never    Smokeless tobacco: Never   Vaping Use    Vaping Use: Never used   Substance and Sexual Activity    Alcohol use: Yes     Alcohol/week: 1.0 - 2.0 standard drink of alcohol     Types: 1 - 2 Glasses of wine per week     Comment: weekly    Drug use: No   Other Topics Concern    Caffeine Concern Yes     Comment: 4 cups coffee daily       Available pre-op labs reviewed.     Lab Results   Component Value Date     01/06/2024    K 3.7 01/06/2024     01/06/2024    CO2 33.0 (H) 01/06/2024    BUN 22 01/06/2024    CREATSERUM 1.02 01/06/2024     (H) 01/06/2024    CA 9.4 01/06/2024          Vital Signs:  Body mass index is 33.84 kg/m².   height is 1.575 m (5' 2\") and weight is 83.9 kg (185 lb). Her oral temperature is 97.9 °F (36.6 °C). Her blood pressure is 139/55 and her pulse is 69. Her respiration is 16 and oxygen saturation is 98%.   Vitals:    03/02/24 0948 03/06/24 0817   BP:  139/55   Pulse:  69   Resp:  16   Temp:  97.9 °F (36.6 °C)   TempSrc:  Oral   SpO2:  98%   Weight: 83 kg (183 lb) 83.9 kg (185 lb)   Height: 1.575 m (5' 2\")         Anesthesia Evaluation     Patient summary reviewed and Nursing notes reviewed    Airway   Mallampati: II  TM distance: >3 FB  Neck ROM: full  Dental - Dentition appears grossly intact     Pulmonary - normal exam   (+) asthma, sleep apnea  Cardiovascular - normal exam  (+) hypertension, CAD    Neuro/Psych      GI/Hepatic/Renal    (+) GERD    Endo/Other    (+) diabetes mellitus  Abdominal  - normal exam                 Anesthesia Plan:   ASA:  3  Plan:   General  Airway:  ETT  Post-op Pain Management: IV analgesics  Informed Consent Plan and Risks Discussed With:   Patient      I have informed Katelynn Cr and/or legal guardian or family member of the nature of the anesthetic plan, benefits, risks including possible dental damage if relevant, major complications, and any alternative forms of anesthetic management.   All of the patient's questions were answered to the best of my ability. The patient desires the anesthetic management as planned.  GRICEL COCHRAN MD  3/6/2024 8:35 AM  Present on Admission:  **None**

## 2024-03-06 NOTE — ANESTHESIA POSTPROCEDURE EVALUATION
Patient: Katelynn Cr    Procedure Summary       Date: 03/06/24 Room / Location: [facility] MAIN OR 01 / [facility] MAIN OR    Anesthesia Start: 1003 Anesthesia Stop: 1132    Procedure: Endoscopic right maxillary antrostomy with tissue removal, Endoscopic right anterior ethmodectomy and placement of propel implant with possible gentamicin irrigation (Right: Nose) Diagnosis:       Chronic maxillary sinusitis      (Chronic maxillary sinusitis [J32.0])    Surgeons: No Bocanegra MD Anesthesiologist: Curry Faustin MD    Anesthesia Type: general ASA Status: 3            Anesthesia Type: general    Vitals Value Taken Time   /55 03/06/24 1131   Temp 97. 03/06/24 1132   Pulse 84 03/06/24 1132   Resp 21 03/06/24 1132   SpO2 99 03/06/24 1132   Vitals shown include unfiled device data.    [facility] AN Post Evaluation:   Patient Evaluated in PACU  Patient Participation: complete - patient participated  Level of Consciousness: awake  Pain Score: 0  Pain Management: adequate  Airway Patency:patent  Yes    Cardiovascular Status: acceptable  Respiratory Status: acceptable  Postoperative Hydration acceptable      Vera Canales, CRNA  3/6/2024 11:32 AM

## 2024-03-07 ENCOUNTER — TELEPHONE (OUTPATIENT)
Dept: OTOLARYNGOLOGY | Facility: CLINIC | Age: 74
End: 2024-03-07

## 2024-03-07 ENCOUNTER — OFFICE VISIT (OUTPATIENT)
Dept: OTOLARYNGOLOGY | Facility: CLINIC | Age: 74
End: 2024-03-07

## 2024-03-07 VITALS — HEIGHT: 62 IN | WEIGHT: 185 LBS | BODY MASS INDEX: 34.04 KG/M2

## 2024-03-07 DIAGNOSIS — J32.0 CHRONIC RIGHT MAXILLARY SINUSITIS: Primary | ICD-10-CM

## 2024-03-07 DIAGNOSIS — J32.2 CHRONIC ETHMOIDAL SINUSITIS: ICD-10-CM

## 2024-03-07 PROCEDURE — 99024 POSTOP FOLLOW-UP VISIT: CPT | Performed by: SPECIALIST

## 2024-03-07 NOTE — OPERATIVE REPORT
[facility]    PATIENT'S NAME: STEVE LAM   ATTENDING PHYSICIAN: No Bocanegra MD   OPERATING PHYSICIAN: No Bocanegra MD   PATIENT ACCOUNT#:   [id number]    LOCATION:  [address]  MEDICAL RECORD #:   [id number]       YOB: 1950  ADMISSION DATE:       03/06/2024      OPERATION DATE:  03/06/2024    OPERATIVE REPORT      PREOPERATIVE DIAGNOSIS:  Chronic right maxillary and ethmoid sinusitis.  POSTOPERATIVE DIAGNOSIS:  Chronic right maxillary and ethmoid sinusitis.  PROCEDURE:  Endoscopic right maxillary antrostomy with tissue removal, as well as right anterior ethmoidectomy, and placement of Propel implant.    ANESTHESIA:  General by oral endotracheal tube.    ESTIMATED BLOOD LOSS:  15 mL.    COMPLICATIONS:  None.    SPECIMENS:  Sinus contents.    INDICATIONS:  This is a 73-year-old female who has been on many rounds of antibiotic therapy.  She has had a chronic pseudomonas infection in her sinus.  For the above-mentioned reason, the procedure was indicated.    OPERATIVE TECHNIQUE:  Patient was taken to the operating room suite, placed under general anesthesia and an oral endotracheal tube was placed.  Then, 0.5 mL of 1% lidocaine with 1:100,000 epinephrine was infiltrated into the sphenopalatine foramen.  Next, attention was directed toward the nose.  There was a synechiae superiorly obscuring visualization of the middle meatal area.  This was from the deviated right nasal septum to the right lateral wall.  This was taken down with a bipolar cautery.  After this was done, the middle meatus was better visualized.  The septum was quite deviated, made difficult to get into the middle meatus.  However, I was able to do that and inject the uncinate process.  A backbiter was used to remove this.  The natural os of the maxillary sinus was identified.  There was a large amount of granulation tissue.  There was purulence in this area.  The natural os was identified and widened  using a Nidhi forceps.  It was then irrigated with gentamicin solution.  The anterior ethmoid was then opened using a Nidhi forceps.  After this was done, a Propel implant was placed into the maxillary sinus, followed by a Merocel packing.  Patient tolerated the procedure well, was extubated in the operating room suite and taken to the recovery room in good condition.    Dictated By No Bocanegra MD  d: 03/06/2024 11:46:48  t: 03/06/2024 21:31:53  [place] [id number]  [facility]/

## 2024-03-08 NOTE — PROGRESS NOTES
Postoperative day #1  Patient's status post endoscopic maxillary antrostomy on the right and right anterior ethmoidectomy  No complaints.  Physical examination:  Right nasal pack removed without difficulty very slight bleeding.  Pathology showed chronic inflammatory disease.  Impression: Healing as expected.  Plan: No nose blowing for 1 week's time.  Follow-up in 1 week's time, sooner if problems.

## 2024-03-08 NOTE — PATIENT INSTRUCTIONS
Nasal pack was removed.  No nose blowing for 1 week's time.  Follow-up in 1 week's time, sooner if problems.

## 2024-03-11 PROBLEM — M75.101 NONTRAUMATIC TEAR OF RIGHT ROTATOR CUFF: Status: ACTIVE | Noted: 2024-03-11

## 2024-03-14 ENCOUNTER — OFFICE VISIT (OUTPATIENT)
Dept: OTOLARYNGOLOGY | Facility: CLINIC | Age: 74
End: 2024-03-14

## 2024-03-14 VITALS — HEIGHT: 62 IN | BODY MASS INDEX: 34.04 KG/M2 | WEIGHT: 185 LBS

## 2024-03-14 DIAGNOSIS — J32.2 CHRONIC ETHMOIDAL SINUSITIS: ICD-10-CM

## 2024-03-14 DIAGNOSIS — J32.0 CHRONIC RIGHT MAXILLARY SINUSITIS: Primary | ICD-10-CM

## 2024-03-14 PROCEDURE — 31231 NASAL ENDOSCOPY DX: CPT | Performed by: SPECIALIST

## 2024-03-14 PROCEDURE — 99024 POSTOP FOLLOW-UP VISIT: CPT | Performed by: SPECIALIST

## 2024-03-15 NOTE — PROGRESS NOTES
Katelynn Cr is a 73 year old female.   Chief Complaint   Patient presents with    Follow - Up     chronic right maxillary sinusitis     HPI:   Patient here postoperative day #8.  Still reports some postnasal drip.  Status post endoscopic right maxillary antrostomy and ethmoidectomy with placement of propel implant.  Current Outpatient Medications   Medication Sig Dispense Refill    traMADol 50 MG Oral Tab Take 1-2 tablets ( mg total) by mouth every 4 (four) hours as needed for Pain. (Patient not taking: Reported on 3/11/2024) 12 tablet 0    doxycycline 100 MG Oral Cap Take 1 capsule (100 mg total) by mouth 2 (two) times daily. 10 capsule 0    furosemide 40 MG Oral Tab Take 1 tablet (40 mg total) by mouth daily. 90 tablet 0    insulin aspart 100 Units/mL Injection Solution TAKE 60 UNITS DAILY VIA INSULIN PUMP.      Calcipotriene 0.005 % External Solution APPLY TO AFFECTED AREA OR BODY TWICE A DAY      Cholecalciferol (VITAMIN D) 25 MCG (1000 UT) Oral Tab Take 25 mcg by mouth daily.      Insulin Infusion Pump Supplies (INSULIN PUMP SYRINGE RESERVOIR) Does not apply Misc as directed      Levothyroxine Sodium 125 MCG Oral Tab   4    Albuterol Sulfate HFA (PROAIR HFA) 108 (90 Base) MCG/ACT Inhalation Aero Soln Inhale 2 puffs into the lungs every 4 (four) hours as needed for Wheezing or Shortness of Breath. 1 Inhaler 1    NOVOLOG 100 UNIT/ML Subcutaneous Solution   4    Benazepril-Hydrochlorothiazide (LOTENSIN HCT) 10-12.5 MG Oral Tab Take 1 tablet by mouth daily.      Omeprazole 40 MG Oral Capsule Delayed Release Take 1 capsule (40 mg total) by mouth daily.        Past Medical History:   Diagnosis Date    Ankle fracture, lateral malleolus, closed     Asthma (HCC)     Breast cancer ([facility]) 06/21/2012    Breast cancer of upper-outer quadrant of left female breast ([facility]) 06/21/2012    Breast cancer, left ([facility]) 2009    lumpectomy 01/16/2009    Breast cancer, left ([facility]) 2009    left breast re-excision, left axillary  SLNB    Breast tenderness in female 06/10/2018    CAD (coronary artery disease) 2012    Carotid artery disease (HCC)     LEFT heart cath adn PCI 2010    Cataract     Deep vein thrombosis (HCC)     R arm    Diabetes mellitus (HCC) 2012    Encounter for fitting of portacath 2009    venous access; portacath placement, 2009    Encounter for imaging to assess osteopenia 2016    Encounter for monitoring aromatase inhibitor therapy 06/10/2018    Essential hypertension     GERD (gastroesophageal reflux disease) 2012    H/O  section     x2    H/O vitrectomy     LEFT vitrectomy    Hip bursitis     left    Hyperlipidemia 2012    Hypertension 2012    Hypothyroidism 2012    Malignant neoplasm of upper-outer quadrant of left breast in female, estrogen receptor positive (HCC) 2012    Neuropathy     B/L feet    Osteoarthritis     B/L shoulders    Osteopenia of lumbar spine 06/10/2018    Personal history of antineoplastic chemotherapy     Psoriasis 2012    Sleep apnea     Squamous cell cancer of skin of left cheek 2024    Thrombus     SVC thrombus; Thrombolysis 2009    Trigger finger of both hands 12/10/2012    Type 1 diabetes mellitus (HCC)     Type 1 diabetes mellitus with hyperglycemia ([facility]) 10/03/2021    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 ([facility]) 2009    Visual impairment     glasses, contacts      Social History:  Social History     Socioeconomic History    Marital status:    Tobacco Use    Smoking status: Never    Smokeless tobacco: Never   Vaping Use    Vaping Use: Never used   Substance and Sexual Activity    Alcohol use: Yes     Alcohol/week: 1.0 - 2.0 standard drink of alcohol     Types: 1 - 2 Glasses of wine per week     Comment: weekly    Drug use: No   Other Topics Concern    Caffeine Concern Yes     Comment: 4 cups coffee daily        REVIEW OF SYSTEMS:   GENERAL HEALTH: feels well  otherwise  GENERAL : denies fever, chills, sweats, weight loss, weight gain  SKIN: denies any unusual skin lesions or rashes  RESPIRATORY: denies shortness of breath with exertion  NEURO: denies headaches    EXAM:   Ht 5' 2\" (1.575 m)   Wt 185 lb (83.9 kg)   BMI 33.84 kg/m²   System Details   Skin Inspection - Normal.   Constitutional Overall appearance - Normal.   Head/Face Facial features - Normal. Eyebrows - Normal. Skull - Normal.   Eyes Conjunctiva - Right: Normal, Left: Normal. Pupil - Right: Normal, Left: Normal.    Ears Inspection - Right: Normal, Left: Normal.   Canal - Right: Normal, Left: Normal.   TM - Right: Normal, Left: Normal.   Nasal External nose - Normal.   Consent was obtained.  The nasal cavity was anesthetized with 1% neosynephrine and 4% lidocaine.  The bilateral nares were examined from the nasal vestibule to the nasopharynx.  Areas examined include the nasal floor, turbinates, superior, middle, and inferior meatus, sphenoethmoid recess, the nasal septum, eustation tube and nasopharynx.  All abnormalities are listed in the exam section.  Left side normal.  No purulence seen in the right middle meatus.   Oral/Oropharynx Lips - Normal, Tonsils - Normal, Tongue - Normal    Neck Exam Inspection - Normal. Palpation - Normal. Parotid gland - Normal. Thyroid gland - Normal.   Lymph Detail Submental. Submandibular. Anterior cervical. Posterior cervical. Supraclavicular all without enlargement   Psychiatric Orientation - Oriented to time, place, person & situation. Appropriate mood and affect.   Neurological Memory - Normal. Cranial nerves - Cranial nerves II through XII grossly intact.     ASSESSMENT AND PLAN:   1. Chronic right maxillary sinusitis  Markedly improved after surgery.  No further purulence seen.  Patient okay to start to gently blow the nose.  Can also start using nasal saline or Flonase.  Okay to resume aspirin and Advil.    2. Chronic ethmoidal sinusitis  Follow-up in 2 weeks time,  sooner if problems.      The patient indicates understanding of these issues and agrees to the plan.      No Bocanegra MD  3/14/2024  9:27 PM

## 2024-03-15 NOTE — PATIENT INSTRUCTIONS
No purulence seen in your right nares by fiberoptic exam.  Okay to start nasal saline spray and Flonase.  Okay to restart aspirin.  And gently blow the nose.  Follow-up in 2 weeks time, sooner if problems.

## 2024-03-28 ENCOUNTER — OFFICE VISIT (OUTPATIENT)
Dept: OTOLARYNGOLOGY | Facility: CLINIC | Age: 74
End: 2024-03-28

## 2024-03-28 VITALS — HEIGHT: 62 IN | BODY MASS INDEX: 34.04 KG/M2 | WEIGHT: 185 LBS

## 2024-03-28 DIAGNOSIS — J32.0 CHRONIC RIGHT MAXILLARY SINUSITIS: Primary | ICD-10-CM

## 2024-03-28 PROBLEM — M24.111 DEGENERATIVE TEAR OF GLENOID LABRUM OF RIGHT SHOULDER: Status: ACTIVE | Noted: 2024-03-28

## 2024-03-28 PROCEDURE — 99024 POSTOP FOLLOW-UP VISIT: CPT | Performed by: SPECIALIST

## 2024-03-29 NOTE — PATIENT INSTRUCTIONS
No evidence of infection on the day to your visit.  Continue nasal irrigation.  Follow-up in 1 month's time, sooner if problems.

## 2024-03-29 NOTE — PROGRESS NOTES
Postoperative day #22  Patient's status post right ethmoidectomy and maxillary antrostomy with propel implant.  No complaints.  Physical examination:  Nose: Slight crusting and middle meatus.  No purulence seen.  Impression: Healing as expected.  Plan: Continue nasal saline irrigation.  Follow-up in 1 month's time, sooner if problems.  Will consider removing propel implant at that point in time if still visible.

## 2024-04-03 ENCOUNTER — LAB ENCOUNTER (OUTPATIENT)
Dept: LAB | Facility: HOSPITAL | Age: 74
End: 2024-04-03
Attending: INTERNAL MEDICINE
Payer: MEDICARE

## 2024-04-03 ENCOUNTER — ORDER TRANSCRIPTION (OUTPATIENT)
Dept: ADMINISTRATIVE | Facility: HOSPITAL | Age: 74
End: 2024-04-03

## 2024-04-03 DIAGNOSIS — I10 HYPERTENSION: ICD-10-CM

## 2024-04-03 DIAGNOSIS — E78.2 HYPERLIPIDEMIA, MIXED: ICD-10-CM

## 2024-04-03 DIAGNOSIS — I25.10 CAD (CORONARY ARTERY DISEASE): Primary | ICD-10-CM

## 2024-04-03 DIAGNOSIS — R53.83 FATIGUE: ICD-10-CM

## 2024-04-03 DIAGNOSIS — E10.59 TYPE 1 DIABETES MELLITUS WITH OTHER CIRCULATORY COMPLICATION (HCC): Primary | ICD-10-CM

## 2024-04-03 DIAGNOSIS — I10 ESSENTIAL HYPERTENSION, MALIGNANT: ICD-10-CM

## 2024-04-03 LAB
ALBUMIN SERPL-MCNC: 4 G/DL (ref 3.2–4.8)
ALBUMIN/GLOB SERPL: 1.4 {RATIO} (ref 1–2)
ALP LIVER SERPL-CCNC: 123 U/L
ALT SERPL-CCNC: 23 U/L
ANION GAP SERPL CALC-SCNC: 8 MMOL/L (ref 0–18)
AST SERPL-CCNC: 32 U/L (ref ?–34)
BILIRUB SERPL-MCNC: 0.6 MG/DL (ref 0.2–1.1)
BUN BLD-MCNC: 19 MG/DL (ref 9–23)
BUN/CREAT SERPL: 17.4 (ref 10–20)
CALCIUM BLD-MCNC: 9.4 MG/DL (ref 8.7–10.4)
CHLORIDE SERPL-SCNC: 100 MMOL/L (ref 98–112)
CHOLEST SERPL-MCNC: 192 MG/DL (ref ?–200)
CO2 SERPL-SCNC: 32 MMOL/L (ref 21–32)
CREAT BLD-MCNC: 1.09 MG/DL
EGFRCR SERPLBLD CKD-EPI 2021: 53 ML/MIN/1.73M2 (ref 60–?)
EST. AVERAGE GLUCOSE BLD GHB EST-MCNC: 214 MG/DL (ref 68–126)
FASTING PATIENT LIPID ANSWER: YES
FASTING STATUS PATIENT QL REPORTED: YES
GLOBULIN PLAS-MCNC: 2.9 G/DL (ref 2.8–4.4)
GLUCOSE BLD-MCNC: 132 MG/DL (ref 70–99)
HBA1C MFR BLD: 9.1 %
HBA1C MFR BLD: 9.1 % (ref ?–5.7)
HDLC SERPL-MCNC: 63 MG/DL (ref 40–59)
LDLC SERPL CALC-MCNC: 110 MG/DL (ref ?–100)
NONHDLC SERPL-MCNC: 129 MG/DL (ref ?–130)
OSMOLALITY SERPL CALC.SUM OF ELEC: 294 MOSM/KG (ref 275–295)
POTASSIUM SERPL-SCNC: 3.2 MMOL/L (ref 3.5–5.1)
PROT SERPL-MCNC: 6.9 G/DL (ref 5.7–8.2)
SODIUM SERPL-SCNC: 140 MMOL/L (ref 136–145)
T3FREE SERPL-MCNC: 3.05 PG/ML (ref 2.4–4.2)
T4 FREE SERPL-MCNC: 1.9 NG/DL (ref 0.8–1.7)
TRIGL SERPL-MCNC: 108 MG/DL (ref 30–149)
TSI SER-ACNC: 1.57 MIU/ML (ref 0.55–4.78)
VLDLC SERPL CALC-MCNC: 18 MG/DL (ref 0–30)

## 2024-04-03 PROCEDURE — 36415 COLL VENOUS BLD VENIPUNCTURE: CPT

## 2024-04-03 PROCEDURE — 84439 ASSAY OF FREE THYROXINE: CPT

## 2024-04-03 PROCEDURE — 84481 FREE ASSAY (FT-3): CPT

## 2024-04-03 PROCEDURE — 80053 COMPREHEN METABOLIC PANEL: CPT

## 2024-04-03 PROCEDURE — 80061 LIPID PANEL: CPT

## 2024-04-03 PROCEDURE — 84443 ASSAY THYROID STIM HORMONE: CPT

## 2024-04-03 PROCEDURE — 83036 HEMOGLOBIN GLYCOSYLATED A1C: CPT

## 2024-04-22 ENCOUNTER — CLINICAL ABSTRACT (OUTPATIENT)
Dept: CARE COORDINATION | Age: 74
End: 2024-04-22

## 2024-04-25 ENCOUNTER — OFFICE VISIT (OUTPATIENT)
Dept: OTOLARYNGOLOGY | Facility: CLINIC | Age: 74
End: 2024-04-25

## 2024-04-25 VITALS — BODY MASS INDEX: 33.86 KG/M2 | WEIGHT: 184 LBS | HEIGHT: 62 IN

## 2024-04-25 DIAGNOSIS — J32.2 CHRONIC ETHMOIDAL SINUSITIS: ICD-10-CM

## 2024-04-25 DIAGNOSIS — J32.0 CHRONIC RIGHT MAXILLARY SINUSITIS: Primary | ICD-10-CM

## 2024-04-25 PROCEDURE — 99212 OFFICE O/P EST SF 10 MIN: CPT | Performed by: SPECIALIST

## 2024-04-25 RX ORDER — METOPROLOL TARTRATE 100 MG/1
TABLET ORAL
COMMUNITY
Start: 2024-04-03

## 2024-04-26 NOTE — PATIENT INSTRUCTIONS
There was some slight crusting in your right nares.  No recurrent sinusitis noted by speculum examination.  Please start some nasal saline spray.  Follow-up in 3 months time, sooner if problems.
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
## 2024-05-09 RX ORDER — FUROSEMIDE 40 MG/1
40 TABLET ORAL DAILY
Qty: 90 TABLET | Refills: 0 | Status: SHIPPED | OUTPATIENT
Start: 2024-05-09

## 2024-05-28 RX ORDER — METOPROLOL SUCCINATE 25 MG/1
25 TABLET, EXTENDED RELEASE ORAL DAILY
COMMUNITY

## 2024-05-30 ENCOUNTER — HOSPITAL ENCOUNTER (OUTPATIENT)
Dept: CT IMAGING | Facility: HOSPITAL | Age: 74
Discharge: HOME | End: 2024-05-30
Attending: INTERNAL MEDICINE
Payer: MEDICARE

## 2024-05-30 VITALS
HEART RATE: 66 BPM | HEIGHT: 62 IN | WEIGHT: 178 LBS | RESPIRATION RATE: 17 BRPM | SYSTOLIC BLOOD PRESSURE: 119 MMHG | DIASTOLIC BLOOD PRESSURE: 56 MMHG | BODY MASS INDEX: 32.76 KG/M2

## 2024-05-30 DIAGNOSIS — I25.10 CAD (CORONARY ARTERY DISEASE): ICD-10-CM

## 2024-05-30 DIAGNOSIS — R53.83 FATIGUE: ICD-10-CM

## 2024-05-30 DIAGNOSIS — E78.2 HYPERLIPIDEMIA, MIXED: ICD-10-CM

## 2024-05-30 DIAGNOSIS — I10 HYPERTENSION: ICD-10-CM

## 2024-05-30 LAB
CREAT BLD-MCNC: 1 MG/DL
EGFRCR SERPLBLD CKD-EPI 2021: 59 ML/MIN/1.73M2 (ref 60–?)

## 2024-05-30 PROCEDURE — 75574 CT ANGIO HRT W/3D IMAGE: CPT | Performed by: INTERNAL MEDICINE

## 2024-05-30 PROCEDURE — 82565 ASSAY OF CREATININE: CPT

## 2024-05-30 PROCEDURE — 75580 N-INVAS EST C FFR SW ALY CTA: CPT | Performed by: INTERNAL MEDICINE

## 2024-05-30 RX ORDER — DILTIAZEM HYDROCHLORIDE 5 MG/ML
5 INJECTION INTRAVENOUS SEE ADMIN INSTRUCTIONS
Status: DISCONTINUED | OUTPATIENT
Start: 2024-05-30 | End: 2024-06-01

## 2024-05-30 RX ORDER — DILTIAZEM HYDROCHLORIDE 5 MG/ML
INJECTION INTRAVENOUS
Status: COMPLETED
Start: 2024-05-30 | End: 2024-05-30

## 2024-05-30 RX ORDER — NITROGLYCERIN 0.4 MG/1
0.4 TABLET SUBLINGUAL ONCE
Status: COMPLETED | OUTPATIENT
Start: 2024-05-30 | End: 2024-05-30

## 2024-05-30 RX ORDER — METOPROLOL TARTRATE 1 MG/ML
INJECTION, SOLUTION INTRAVENOUS
Status: COMPLETED
Start: 2024-05-30 | End: 2024-05-30

## 2024-05-30 RX ORDER — METOPROLOL TARTRATE 1 MG/ML
5 INJECTION, SOLUTION INTRAVENOUS SEE ADMIN INSTRUCTIONS
Status: DISCONTINUED | OUTPATIENT
Start: 2024-05-30 | End: 2024-06-01

## 2024-05-30 RX ADMIN — Medication 50 MG: at 10:15:00

## 2024-05-30 RX ADMIN — Medication 50 MG: at 09:30:00

## 2024-05-30 RX ADMIN — DILTIAZEM HYDROCHLORIDE 5 MG: 5 INJECTION INTRAVENOUS at 11:55:00

## 2024-05-30 RX ADMIN — METOPROLOL TARTRATE 5 MG: 1 INJECTION, SOLUTION INTRAVENOUS at 11:20:00

## 2024-05-30 RX ADMIN — METOPROLOL TARTRATE 5 MG: 1 INJECTION, SOLUTION INTRAVENOUS at 11:10:00

## 2024-05-30 RX ADMIN — DILTIAZEM HYDROCHLORIDE 5 MG: 5 INJECTION INTRAVENOUS at 11:35:00

## 2024-05-30 RX ADMIN — DILTIAZEM HYDROCHLORIDE 5 MG: 5 INJECTION INTRAVENOUS at 11:40:00

## 2024-05-30 RX ADMIN — DILTIAZEM HYDROCHLORIDE 5 MG: 5 INJECTION INTRAVENOUS at 11:30:00

## 2024-05-30 RX ADMIN — NITROGLYCERIN 0.4 MG: 0.4 TABLET SUBLINGUAL at 12:00:00

## 2024-05-30 RX ADMIN — METOPROLOL TARTRATE 5 MG: 1 INJECTION, SOLUTION INTRAVENOUS at 11:25:00

## 2024-05-30 RX ADMIN — METOPROLOL TARTRATE 5 MG: 1 INJECTION, SOLUTION INTRAVENOUS at 11:05:00

## 2024-05-30 NOTE — IMAGING NOTE
TO RAD HOLDING AT ***      HX TAKEN: ***    PT CONSENTED AT ***     BASELINE VITAL SIGNS: HR ***  BP *** BMI ***    CTA ORDERED BY ORVILLE KOHLI MD; WAS PT GIVEN CTA PREMEDS NO, IF YES ***    METOPROLOL PO GIVEN 50 MILLIGRAMS  MILLIGRAMS  AT ***    METOPROLOL PO GIVEN 50 MILLIGRAMS  MILLIGRAMS  AT ***    18 GAUGE IV STARTED AT *** POC TESTING COMPLETED GFR = ***   CREATINE = ***    ***: HR *** BP *** METOPROLOL 5 MILLIGRAMS GIVEN IV PUSH  SEE  PROTOCOL    ***: HR *** BP *** METOPROLOL 5 MILLIGRAMS GIVEN IV PUSH     ***: HR *** BP *** METOPROLOL 5 MILLIGRAMS  GIVEN IV PUSH    ***: HR *** BP *** METOPROLOL 5 MILLIGRAMS  GIVEN IV PUSH    ***: HR *** BP *** CARDIZEM 5 MILLIGRAMS  GIVEN IV PUSH     ***: HR *** BP *** CARDIZEM 5 MILLIGRAMS  GIVEN IV PUSH     ***: HR *** BP *** CARDIZEM 5 MILLIGRAMS  GIVEN IV PUSH     ***: HR *** BP *** CARDIZEM 5 MILLIGRAMS  GIVEN IV PUSH     ***: HR *** BP *** CARDIZEM 5 MILLIGRAMS  GIVEN IV PUSH     TO CT TABLE @ ***    CONNECT TO MONITOR  HR*** BP ***      NITROGLYCERIN 0.4 MILLIGRAMS SUBLINGUAL GIVEN AT ***     CALCIUM SCORE COMPLETED AT ***     INJECTION STARTED AT ***,  HR *** DURING SCAN, PROCEDURE COMPLETE    POST SCAN HR *** BP *** AT ***    PT TO HOLDING AREA  VS HR *** BP *** AT ***     AVS  PROVIDED      VS HR *** BP *** AT ***     *** DISCHARGED HOME

## 2024-05-30 NOTE — IMAGING NOTE
0910 TO RAD HOLDING     HX TAKEN: HX  HTN, DM, HYPOTHYROID. OBESITY,   ALG REVIEWED.   PROCEDURE Q&A R/B DISCUSSED, PT V/U AND AGREES - PT CONSENTED      CTA ORDERED BY SEUN WAS PT GIVEN CTA PREMEDS -YES - METOPROLOL TARTRATE 50 MG HS / AM LAST DOSE 0800     BASELINE VITAL SIGNS: HR 66  /56 BMI 33  0930 METOPROLOL TARTRATE  50 MILLIGRAMS PO GIVEN    0945 18 GAUGE IV STARTED   POC TESTING COMPLETED GFR = 59   CREATINE = 1.0    1015 /59 HR 69  METOPROLOL PO GIVEN 50 MILLIGRAMS     1020 NO C/O D/L - UP TO BR W/STEADY GAIT, VOIDED  1030-  PT INSTRUCTED /ENCOURAGE REST - FOR RESTING HR.     1105: HR 71 /42 METOPROLOL 5 MILLIGRAMS GIVEN IV PUSH  SEE  PROTOCOL    1110: HR 71 /58 METOPROLOL 5 MILLIGRAMS GIVEN IV PUSH     1120: HR 74 /48  METOPROLOL 5 MILLIGRAMS  GIVEN IV PUSH    1125 : HR 69  /58  METOPROLOL 5 MILLIGRAMS  GIVEN IV PUSH    1130: HR 69 /52 CARDIZEM 5 MILLIGRAMS  GIVEN IV PUSH     1135: HR 69 /45 CARDIZEM 5 MILLIGRAMS  GIVEN IV PUSH     1140: HR 62 /47  CARDIZEM 5 MILLIGRAMS  GIVEN IV PUSH     1145 HR 59,     1150 TO CT TABLE     CONNECT TO MONITOR  HR 58 -62 /51     1155 : HR CARDIZEM 5 MILLIGRAMS  GIVEN IV PUSH   1200 NITROGLYCERIN 0.4 MILLIGRAMS SUBLINGUAL GIVEN  /53 HR 60    1203 CALCIUM SCORE COMPLETED      1209 INJECTION STARTED  HR  no pictures per CT tech  1213 REINJ. HR 55  DURING SCAN PROCEDURE COMPLETE    1213 POST SCAN HR 60 /50     1220 PT TO HOLDING AREA  VS Q 15 MIN X2 , /52 HR 60. 16 OZ H2O GIVEN-INSTRUCTED PLENTY OF FLUIDS TO FLUSH CONTRAST.   1235 HR 58  BP 98/49, REINFORCED FLUID, H2O AND COFFEE GIVEN. DENIES D/L. C/O SL HA.      1245  HR 65 /53, NO PT C/O DENIES D/L/HA.  H2O INTAKE     1250 PT ENDORSED TO CDOMINGO RN TO DC PT.  AVS  PROVIDED   DISCHARGED HOME  IN STABLE CONDITION, STEADY GAIT ACCOMPANIED BY FRIEND.

## 2024-06-24 ENCOUNTER — OFFICE VISIT (OUTPATIENT)
Dept: PULMONOLOGY | Facility: CLINIC | Age: 74
End: 2024-06-24

## 2024-06-24 VITALS
SYSTOLIC BLOOD PRESSURE: 126 MMHG | HEIGHT: 62 IN | DIASTOLIC BLOOD PRESSURE: 70 MMHG | BODY MASS INDEX: 34.6 KG/M2 | HEART RATE: 70 BPM | WEIGHT: 188 LBS

## 2024-06-24 DIAGNOSIS — G47.33 OSA (OBSTRUCTIVE SLEEP APNEA): Primary | ICD-10-CM

## 2024-06-24 PROCEDURE — 99213 OFFICE O/P EST LOW 20 MIN: CPT | Performed by: INTERNAL MEDICINE

## 2024-06-24 NOTE — PROGRESS NOTES
Subjective:   Patient ID: Katelynn Cr is a 74 year old female.    Compliant with CPAP and try to use it every night and all night  No significant sleepiness  Slight residual tiredness  No active cough or sputum or wheezes  No chest pain no significant lower extremity edema  Tolerated CPAP therapy      Doing very wellHistory/Other:   Review of Systems   Constitutional: Negative.    HENT:  Negative for congestion.    Respiratory:  Negative for cough.    Cardiovascular: Negative.    Gastrointestinal: Negative.    Neurological: Negative.    Hematological: Negative.    Psychiatric/Behavioral: Negative.       Current Outpatient Medications   Medication Sig Dispense Refill    metoprolol succinate ER 25 MG Oral Tablet 24 Hr Take 1 tablet (25 mg total) by mouth daily.      FUROSEMIDE 40 MG Oral Tab TAKE ONE TABLET BY MOUTH ONE TIME DAILY 90 tablet 0    metoprolol tartrate 100 MG Oral Tab Take 1 tablet by mouth the evening before the CTA and 1 tablet the morning of the CTA. Hold Metoprolol Succinate the morning of the CTA.      insulin aspart 100 Units/mL Injection Solution TAKE 60 UNITS DAILY VIA INSULIN PUMP.      Calcipotriene 0.005 % External Solution APPLY TO AFFECTED AREA OR BODY TWICE A DAY      Cholecalciferol (VITAMIN D) 25 MCG (1000 UT) Oral Tab Take 25 mcg by mouth daily.      Insulin Infusion Pump Supplies (INSULIN PUMP SYRINGE RESERVOIR) Does not apply Misc as directed      Levothyroxine Sodium 125 MCG Oral Tab   4    Albuterol Sulfate HFA (PROAIR HFA) 108 (90 Base) MCG/ACT Inhalation Aero Soln Inhale 2 puffs into the lungs every 4 (four) hours as needed for Wheezing or Shortness of Breath. 1 Inhaler 1    NOVOLOG 100 UNIT/ML Subcutaneous Solution   4    Benazepril-Hydrochlorothiazide (LOTENSIN HCT) 10-12.5 MG Oral Tab Take 1 tablet by mouth daily.      Omeprazole 40 MG Oral Capsule Delayed Release Take 1 capsule (40 mg total) by mouth daily.       Allergies:  Allergies   Allergen Reactions    Penicillins  HIVES     Other reaction(s): PENICILLINS  Other reaction(s): Unknown    Clindamycin     Dust Mite Extract OTHER (SEE COMMENTS)     Nasal congestion    Sulfa Antibiotics     Rosuvastatin RASH       Objective:   Physical Exam  Constitutional:       General: She is not in acute distress.     Appearance: She is obese. She is not ill-appearing.   HENT:      Head: Normocephalic and atraumatic.      Nose: Nose normal.      Mouth/Throat:      Mouth: Mucous membranes are moist.   Eyes:      General: No scleral icterus.  Cardiovascular:      Rate and Rhythm: Normal rate.      Heart sounds:      No gallop.   Pulmonary:      Effort: No respiratory distress.      Breath sounds: No stridor. No wheezing, rhonchi or rales.   Abdominal:      General: Abdomen is flat.      Palpations: Abdomen is soft.   Musculoskeletal:      Right lower leg: No edema.      Left lower leg: No edema.   Lymphadenopathy:      Cervical: No cervical adenopathy.   Skin:     General: Skin is dry.   Neurological:      Mental Status: She is oriented to person, place, and time.         Assessment & Plan:   1. NANCY (obstructive sleep apnea)        1-moderately severe NANCY with AHI of 30  On auto CPAP 5-10 CWP  Doing well with CPAP  Excellent subjective and objective compliance  I reviewed her download data with 87% adherence  Effective therapy with no residual AHI     Continue CPAP therapy  Keep regular sleep-wake cycles  Avoid driving if sleepy  Avoid working in heavy machinery if sleepy  Avoid sedative and narcotic and alcohol     F/u in 1 year        Meds This Visit:  Requested Prescriptions      No prescriptions requested or ordered in this encounter       Imaging & Referrals:  None

## 2024-06-28 ENCOUNTER — MED REC SCAN ONLY (OUTPATIENT)
Dept: INTERNAL MEDICINE CLINIC | Facility: CLINIC | Age: 74
End: 2024-06-28

## 2024-07-03 ENCOUNTER — LAB ENCOUNTER (OUTPATIENT)
Dept: LAB | Facility: HOSPITAL | Age: 74
End: 2024-07-03
Attending: INTERNAL MEDICINE
Payer: MEDICARE

## 2024-07-03 DIAGNOSIS — E10.59 TYPE 1 DIABETES MELLITUS WITH VASCULAR DISEASE (HCC): Primary | ICD-10-CM

## 2024-07-03 LAB
ALBUMIN SERPL-MCNC: 3.8 G/DL (ref 3.2–4.8)
ALBUMIN/GLOB SERPL: 1.4 {RATIO} (ref 1–2)
ALP LIVER SERPL-CCNC: 767 U/L
ALT SERPL-CCNC: 206 U/L
ANION GAP SERPL CALC-SCNC: 5 MMOL/L (ref 0–18)
AST SERPL-CCNC: 145 U/L (ref ?–34)
BILIRUB SERPL-MCNC: 1.2 MG/DL (ref 0.2–1.1)
BUN BLD-MCNC: 31 MG/DL (ref 9–23)
BUN/CREAT SERPL: 24.4 (ref 10–20)
CALCIUM BLD-MCNC: 9.4 MG/DL (ref 8.7–10.4)
CHLORIDE SERPL-SCNC: 105 MMOL/L (ref 98–112)
CHOLEST SERPL-MCNC: 216 MG/DL (ref ?–200)
CO2 SERPL-SCNC: 31 MMOL/L (ref 21–32)
CREAT BLD-MCNC: 1.27 MG/DL
EGFRCR SERPLBLD CKD-EPI 2021: 44 ML/MIN/1.73M2 (ref 60–?)
EST. AVERAGE GLUCOSE BLD GHB EST-MCNC: 183 MG/DL (ref 68–126)
FASTING PATIENT LIPID ANSWER: YES
FASTING STATUS PATIENT QL REPORTED: YES
GLOBULIN PLAS-MCNC: 2.7 G/DL (ref 2–3.5)
GLUCOSE BLD-MCNC: 159 MG/DL (ref 70–99)
HBA1C MFR BLD: 8 % (ref ?–5.7)
HDLC SERPL-MCNC: 60 MG/DL (ref 40–59)
LDLC SERPL CALC-MCNC: 139 MG/DL (ref ?–100)
NONHDLC SERPL-MCNC: 156 MG/DL (ref ?–130)
OSMOLALITY SERPL CALC.SUM OF ELEC: 302 MOSM/KG (ref 275–295)
POTASSIUM SERPL-SCNC: 3.9 MMOL/L (ref 3.5–5.1)
PROT SERPL-MCNC: 6.5 G/DL (ref 5.7–8.2)
SODIUM SERPL-SCNC: 141 MMOL/L (ref 136–145)
TRIGL SERPL-MCNC: 95 MG/DL (ref 30–149)
VLDLC SERPL CALC-MCNC: 17 MG/DL (ref 0–30)

## 2024-07-03 PROCEDURE — 80061 LIPID PANEL: CPT

## 2024-07-03 PROCEDURE — 36415 COLL VENOUS BLD VENIPUNCTURE: CPT

## 2024-07-03 PROCEDURE — 80053 COMPREHEN METABOLIC PANEL: CPT

## 2024-07-03 PROCEDURE — 83036 HEMOGLOBIN GLYCOSYLATED A1C: CPT

## 2024-08-05 ENCOUNTER — OFFICE VISIT (OUTPATIENT)
Dept: OTOLARYNGOLOGY | Facility: CLINIC | Age: 74
End: 2024-08-05

## 2024-08-05 VITALS — BODY MASS INDEX: 34.6 KG/M2 | WEIGHT: 188 LBS | HEIGHT: 62 IN

## 2024-08-05 DIAGNOSIS — J34.89 NASAL CRUSTING: Primary | ICD-10-CM

## 2024-08-05 DIAGNOSIS — J32.9 PURULENT POSTNASAL DRAINAGE: ICD-10-CM

## 2024-08-05 PROCEDURE — 99213 OFFICE O/P EST LOW 20 MIN: CPT | Performed by: SPECIALIST

## 2024-08-05 RX ORDER — EZETIMIBE 10 MG/1
10 TABLET ORAL DAILY
COMMUNITY
Start: 2024-05-22

## 2024-08-05 RX ORDER — ATORVASTATIN CALCIUM 40 MG/1
40 TABLET, FILM COATED ORAL DAILY
COMMUNITY
Start: 2024-06-09

## 2024-08-06 NOTE — PATIENT INSTRUCTIONS
I gave you NeilMed irrigation bottle to use with distilled water to irrigate out your nares.  No evidence of purulence in the middle meatus on the day to your visit.  Follow-up in 3-4  months time, sooner if problems.
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
## 2024-08-20 ENCOUNTER — OFFICE VISIT (OUTPATIENT)
Dept: INTERNAL MEDICINE CLINIC | Facility: CLINIC | Age: 74
End: 2024-08-20
Payer: MEDICARE

## 2024-08-20 VITALS
SYSTOLIC BLOOD PRESSURE: 120 MMHG | BODY MASS INDEX: 34.6 KG/M2 | HEIGHT: 62 IN | DIASTOLIC BLOOD PRESSURE: 60 MMHG | OXYGEN SATURATION: 97 % | WEIGHT: 188 LBS | HEART RATE: 65 BPM

## 2024-08-20 DIAGNOSIS — E78.2 MIXED HYPERLIPIDEMIA: ICD-10-CM

## 2024-08-20 DIAGNOSIS — Z00.00 MEDICARE ANNUAL WELLNESS VISIT, SUBSEQUENT: Primary | ICD-10-CM

## 2024-08-20 DIAGNOSIS — Z85.3 HISTORY OF BREAST CANCER: ICD-10-CM

## 2024-08-20 DIAGNOSIS — E10.65 TYPE 1 DIABETES MELLITUS WITH HYPERGLYCEMIA (HCC): ICD-10-CM

## 2024-08-20 DIAGNOSIS — R30.0 DYSURIA: ICD-10-CM

## 2024-08-20 DIAGNOSIS — G47.33 OSA ON CPAP: ICD-10-CM

## 2024-08-20 LAB
BILIRUBIN: NEGATIVE
GLUCOSE (URINE DIPSTICK): NEGATIVE MG/DL
KETONES (URINE DIPSTICK): NEGATIVE MG/DL
MULTISTIX LOT#: ABNORMAL NUMERIC
NITRITE, URINE: NEGATIVE
PH, URINE: 5.5 (ref 4.5–8)
PROTEIN (URINE DIPSTICK): NEGATIVE MG/DL
SPECIFIC GRAVITY: 1.01 (ref 1–1.03)
URINE-COLOR: YELLOW
UROBILINOGEN,SEMI-QN: 0.2 MG/DL (ref 0–1.9)

## 2024-08-20 PROCEDURE — 87088 URINE BACTERIA CULTURE: CPT | Performed by: INTERNAL MEDICINE

## 2024-08-20 PROCEDURE — 81003 URINALYSIS AUTO W/O SCOPE: CPT | Performed by: INTERNAL MEDICINE

## 2024-08-20 PROCEDURE — 87086 URINE CULTURE/COLONY COUNT: CPT | Performed by: INTERNAL MEDICINE

## 2024-08-20 PROCEDURE — G0439 PPPS, SUBSEQ VISIT: HCPCS | Performed by: INTERNAL MEDICINE

## 2024-08-20 PROCEDURE — 87186 SC STD MICRODIL/AGAR DIL: CPT | Performed by: INTERNAL MEDICINE

## 2024-08-20 RX ORDER — TRIAMCINOLONE ACETONIDE 1 MG/G
CREAM TOPICAL
COMMUNITY
Start: 2024-05-13

## 2024-08-20 RX ORDER — MOMETASONE FUROATE 1 MG/G
1 OINTMENT TOPICAL 2 TIMES DAILY
COMMUNITY
Start: 2024-04-24

## 2024-08-20 RX ORDER — SIMVASTATIN 20 MG
20 TABLET ORAL NIGHTLY
COMMUNITY
Start: 2024-05-24

## 2024-08-20 NOTE — PROGRESS NOTES
Subjective:   Katelynn Cr is a 74 year old female who presents for Physical, UTI, and Vaginal Problem     Patient here for a awv and fu on Dmtype1,hyperlipidemia, Htn,  and NANCY compliant with rx.  Is using a new pump and has better glycemic control. Due for mammogram in December.  HPI:   Katelynn Cr is a 74 year old female who presents for a Medicare Annual Wellness visit.    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)    Hypothyroidism    Breast tenderness in female    Encounter for monitoring aromatase inhibitor therapy    Osteopenia of lumbar spine    Sciatica of left side    Trochanteric bursitis of left hip    Spinal stenosis of lumbar region with neurogenic claudication    Wrist pain, acute, left    Contusion of left wrist    Atherosclerosis of coronary artery    Encounter for screening mammogram for malignant neoplasm of breast    Osteopenia determined by x-ray    Type 1 diabetes mellitus with hyperglycemia (HCC)    Hypertension    Arthritis of shoulder region, left    Impingement of left shoulder    Left shoulder pain    Type 1 diabetes mellitus with peripheral circulatory complications (HCC)    Acute pain of right shoulder    Impingement syndrome of right shoulder region    Arthritis of shoulder region, right    Rotator cuff tendonitis, right    NANCY on CPAP    Labral tear of long head of left biceps tendon    Nontraumatic tear of right rotator cuff    Degenerative tear of glenoid labrum of right shoulder       General Health     In the past six months, have you lost more than 10  pounds without trying?: 2 - No    Has your appetite been poor?: No    Type of Diet: Other    How does the patient maintain a good energy level?: Daily Walks    How would you describe your daily physical activity?: Moderate    How would you describe your current health state?: Good    How do you maintain positive mental well-being?: Social Interaction;Puzzles;Visiting Friends;Visiting Family         Have you had any immunizations at another office such as Influenza, Hepatitis B, Tetanus, or Pneumococcal?: No     Functional Ability     Bathing or Showering: Able without help    Toileting: Able without help    Dressing: Able without help    Eating: Able without help    Driving: Able without help    Preparing your meals: Able without help    Managing money/bills: Able without help    Taking medications as prescribed: Able without help    Are you able to afford your medications?: Yes    Hearing Problems?: Yes     Functional Status     Hearing Problems?: Yes    Vision Problems? : No    Difficulty walking?: Yes    Difficulty dressing or bathing?: No    Problems with daily activities? : No    Memory Problems?: No      Fall/Risk Assessment                                                              Depression Screening (PHQ-2/PHQ-9): Over the LAST 2 WEEKS                      Advance Directives     Do you have a healthcare power of ?: Yes    Do you have a living will?: Yes   Was Medicare Assessment Questionnaire completed by patient and sent to HIM for scanning?Yes    Please go to \"Cognitive Assessment\" under Medicare Assessment section in Charting, test patient and document.    Then, refresh your progress note to see your input here.  Cognitive Assessment     What day of the week is this?: Correct    What month is it?: Correct    What year is it?: Correct    Recall \"Ball\": Correct    Recall \"Flag\": Correct    Recall \"Tree\": Correct      ALLERGIES:     Allergies   Allergen Reactions    Penicillins HIVES     Other  reaction(s): PENICILLINS  Other reaction(s): Unknown    Clindamycin     Dust Mite Extract OTHER (SEE COMMENTS)     Nasal congestion    Sulfa Antibiotics     Rosuvastatin RASH       CURRENT MEDICATIONS:   Current Outpatient Medications   Medication Sig Dispense Refill    mometasone 0.1 % External Ointment Apply 1 Application topically 2 (two) times daily. APPLY TO AFFECTED AREA      simvastatin 20 MG Oral Tab Take 1 tablet (20 mg total) by mouth nightly.      omeprazole 20 MG Oral Capsule Delayed Release Take 1 capsule (20 mg total) by mouth before breakfast.      triamcinolone 0.1 % External Cream APPLY UNDER BREST TWICE DAILY      atorvastatin 40 MG Oral Tab Take 1 tablet (40 mg total) by mouth daily.      ezetimibe 10 MG Oral Tab Take 1 tablet (10 mg total) by mouth daily.      metoprolol succinate ER 25 MG Oral Tablet 24 Hr Take 1 tablet (25 mg total) by mouth daily.      FUROSEMIDE 40 MG Oral Tab TAKE ONE TABLET BY MOUTH ONE TIME DAILY 90 tablet 0    metoprolol tartrate 100 MG Oral Tab Take 1 tablet by mouth the evening before the CTA and 1 tablet the morning of the CTA. Hold Metoprolol Succinate the morning of the CTA.      insulin aspart 100 Units/mL Injection Solution TAKE 60 UNITS DAILY VIA INSULIN PUMP.      Calcipotriene 0.005 % External Solution APPLY TO AFFECTED AREA OR BODY TWICE A DAY      Cholecalciferol (VITAMIN D) 25 MCG (1000 UT) Oral Tab Take 25 mcg by mouth daily.      Insulin Infusion Pump Supplies (INSULIN PUMP SYRINGE RESERVOIR) Does not apply Misc as directed      Levothyroxine Sodium 125 MCG Oral Tab   4    Albuterol Sulfate HFA (PROAIR HFA) 108 (90 Base) MCG/ACT Inhalation Aero Soln Inhale 2 puffs into the lungs every 4 (four) hours as needed for Wheezing or Shortness of Breath. 1 Inhaler 1    NOVOLOG 100 UNIT/ML Subcutaneous Solution   4    Benazepril-Hydrochlorothiazide (LOTENSIN HCT) 10-12.5 MG Oral Tab Take 1 tablet by mouth daily.      Omeprazole 40 MG Oral Capsule Delayed Release Take  1 capsule (40 mg total) by mouth daily.        MEDICAL INFORMATION:   Past Medical History:    Ankle fracture, lateral malleolus, closed    Asthma (HCC)    Breast cancer (HCC)    Breast cancer of upper-outer quadrant of left female breast (HCC)    Breast cancer, left (HCC)    lumpectomy 2009    Breast cancer, left (HCC)    left breast re-excision, left axillary SLNB    Breast tenderness in female    CAD (coronary artery disease)    Carotid artery disease (HCC)    LEFT heart cath adn PCI 2010    Cataract    Deep vein thrombosis (HCC)    R arm    Diabetes mellitus (HCC)    Encounter for fitting of portacath    venous access; portacath placement, 2009    Encounter for imaging to assess osteopenia    Encounter for monitoring aromatase inhibitor therapy    Essential hypertension    GERD (gastroesophageal reflux disease)    H/O  section    x2    H/O vitrectomy    LEFT vitrectomy    Hip bursitis    left    Hyperlipidemia    Hypertension    Hypothyroidism    Malignant neoplasm of upper-outer quadrant of left breast in female, estrogen receptor positive (HCC)    Neuropathy    B/L feet    Osteoarthritis    B/L shoulders    Osteopenia of lumbar spine    Personal history of antineoplastic chemotherapy    Psoriasis    Sleep apnea    Squamous cell cancer of skin of left cheek    Thrombus    SVC thrombus; Thrombolysis 2009    Trigger finger of both hands    Type 1 diabetes mellitus (HCC)    Type 1 diabetes mellitus with hyperglycemia (HCC)    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 Ellis    Cataract extraction w/  intraocular lens implant Right 2019    Dr. Ellis    Chemotherapy      Cholecystectomy      Lumpectomy left Left 2009    Needle biopsy right      Needle core biopsy, breast (dmg) Right  10/07/2009    RIGHT breast nodules; mammatome core needle biopsies; 2009    Other surgical history  03/06/2024    Endoscopic right maxillary antrostomy with tissue removal, as well as right anterior ethmoidectomy, and placement of Propel implant.    Radiation left        Family History   Problem Relation Age of Onset    Heart Disease Mother         CAD - Premature    Heart Disease Father         (cause of death)    Breast Cancer Self 60      SOCIAL HISTORY:   Social History     Socioeconomic History    Marital status:    Tobacco Use    Smoking status: Never    Smokeless tobacco: Never   Vaping Use    Vaping status: Never Used   Substance and Sexual Activity    Alcohol use: Yes     Alcohol/week: 1.0 - 2.0 standard drink of alcohol     Types: 1 - 2 Glasses of wine per week     Comment: weekly    Drug use: No   Other Topics Concern    Caffeine Concern Yes     Comment: 4 cups coffee daily     Occ: retired : no       REVIEW OF SYSTEMS:   GENERAL: feels well otherwise  SKIN: denies any unusual skin lesions  EYES: denies blurred vision or double vision contact lenses and reading glassses  HEENT: denies nasal congestion, sinus pain or ST  LUNGS:some shortness of breath with exertion  CARDIOVASCULAR: denies chest pain on exertion  GI: denies abdominal pain, denies heartburn  :some  dysuria, vaginal discharge or itching, no complaint of urinary incontinence   MUSCULOSKELETAL: denies back pain  NEURO: denies headaches  PSYCHE: denies depression or anxiety  HEMATOLOGIC: denies hx of anemia  ENDOCRINE:hypo thyroid history  Dm type 1  ALL/ASTHMA: denies hx of allergy or asthma    EXAM:   /60   Pulse 65   Ht 5' 2\" (1.575 m)   Wt 188 lb (85.3 kg)   SpO2 97%   BMI 34.39 kg/m²      >   Wt Readings from Last 6 Encounters:   08/20/24 188 lb (85.3 kg)   08/05/24 188 lb (85.3 kg)   06/24/24 188 lb (85.3 kg)   05/30/24 178 lb (80.7 kg)   04/25/24 184 lb (83.5 kg)   03/28/24 185 lb (83.9 kg)       >   BP  Readings from Last 3 Encounters:   08/20/24 120/60   06/24/24 126/70   05/30/24 119/56       GENERAL: well developed, well nourished, in no apparent distress  SKIN: no rashes, no suspicious lesions  HEENT: atraumatic, normocephalic, ears and throat are clear   Hearing intact no wax  EYES: PERRLA, EOMI, conjunctiva are clear  Right Eye Visual Acuity: Uncorrected Left Eye Visual Acuity: Uncorrected   Right Eye Chart Acuity: 20/20 Left Eye Chart Acuity: 20/20   NECK: supple, no adenopathy, no bruits  CHEST: no chest tenderness  BREAST: no masses, no discharge or no axillary lymphadenopathy left breast chronic deformity of left upper quadrant  LUNGS: clear to auscultation  CARDIO: RRR without murmur  GI: good BS's, no masses, HSM or tenderness  : deferred  RECTAL: deferred  MUSCULOSKELETAL: back is not tender, FROM of the back  EXTREMITIES: no cyanosis, clubbing or edema.  Diabetic Foot Exam:  No skin breakdown, acute deformity, history of amputation, dystrophic nails or dry skin. Microfilament sensation intact  NEURO: Oriented times three, cranial nerves are intact, motor and sensory are grossly intact    ASSESSMENT AND OTHER RELEVANT CHRONIC CONDITIONS:   Katelynn Cr is a 74 year old female who presents for a Medicare Assessment.     PLAN SUMMARY:   Diagnoses and all orders for this visit:    Medicare annual wellness visit, subsequent    Type 1 diabetes mellitus with hyperglycemia (HCC)    Mixed hyperlipidemia    NANCY on CPAP    Dysuria  -     Urine Culture, Routine [E]; Future  -     POC Urinalysis, Automated Dip without microscopy (PCA and EMMG ONLY) [77928]    History of breast cancer  -     [facility] SUKHDEEP 2D+3D DIAGNOSTIC [facility]  BILAT (CPT=77066/41157); Future         The patient indicates understanding of these issues and agrees to the plan.  The patient is asked to return in 12m for fu       PREVENTATIVE SERVICES  INDICATIONS AND SCHEDULE Internal Lab or Procedure External Lab or Procedure   Diabetes Screening       HbgA1C   Annually HgbA1C (%)   Date Value   07/03/2024 8.0 (H)         No data to display                Fasting Blood Sugar (FSB)Annually No results found for: \"GLUCOSE\"    Cardiovascular Disease Screening     LDL Annually LDL Cholesterol (mg/dL)   Date Value   07/03/2024 139 (H)        EKG One Time     Colorectal Cancer Screening      Colonoscopy Screen every 10 years Health Maintenance   Topic Date Due    Colorectal Cancer Screening  Never done    Update Health Maintenance if applicable    Flex Sigmoidoscopy Screen every 10 years No results found for this or any previous visit.      No data to display                 Fecal Occult Blood Annually No results found for: \"FOB\"      No data to display                Glaucoma Screening      Ophthalmology Visit Annually     Bone Density Screening      Dexascan Every two years Last Dexa Scan:    XR DEXA BONE DENSITOMETRY (CPT=77080) 08/04/2021        No data to display                Pap and Pelvic      Pap  Annually if high risk No recommendations at this time Update Health Maintenance if applicable    Pap  Every two years No recommendations at this time Update Health Maintenance if applicable    Chlamydia  Annually if high risk No results found for: \"CHLAMYDIA\"      No data to display                Screening Mammogram      Mammogram  Annually Health Maintenance   Topic Date Due    Mammogram  Discontinued    Update Health Maintenance if applicable   Immunizations      Influenza No orders found for this or any previous visit. Update Immunization Activity if applicable    Pneumococcal Orders placed or performed in visit on 03/26/18    Pneumococcal (Prevnar 13) (71311) (DX V03.82/Z23)    Update Immunization Activity if applicable    Hepatitis B No orders found for this or any previous visit. Update Immunization Activity if applicable    Tetanus No orders found for this or any previous visit. Update Immunization Activity if applicable        SPECIFIC DISEASE MONITORING  Internal Lab or Procedure External Lab or Procedure   Annual Monitoring of Persistent     Medications (ACE/ARB, digoxin diuretics, anticonvulsants.)    Potassium  Annually Potassium (mmol/L)   Date Value   07/03/2024 3.9     POTASSIUM (P) (mmol/L)   Date Value   07/08/2016 4.2         No data to display                Creatinine  Annually Creatinine (mg/dL)   Date Value   07/03/2024 1.27 (H)         No data to display                BUN  Annually BUN (mg/dL)   Date Value   07/03/2024 31 (H)         No data to display                 Drug Serum Conc  Annually No results found for: \"DIGOXIN\", \"DIG\", \"VALP\"      No data to display                Diabetes      HgbA1C  Annually HgbA1C (%)   Date Value   07/03/2024 8.0 (H)         No data to display                Creat/alb ratio  Annually      LDL  Annually LDL Cholesterol (mg/dL)   Date Value   07/03/2024 139 (H)         No data to display                 Dilated Eye exam  Annually     1/24/2024     9:04 AM   Data entered on:   Last Dilated Eye Exam 1/18/2024          No data to display                COPD      Spirometry Testing Annually No results found for this or any previous visit.      No data to display                  SCREENING SCHEDULE - FEMALE      SCREEN COVERAGE SCHEDULE  (If Indicated) LAST DONE   Dexa If at Risk q2 years    Lipids All Patients q5 years LDL Cholesterol (mg/dL)   Date Value   07/03/2024 139 (H)      Colonoscopy All Patients q10 years Health Maintenance   Topic Date Due    Colorectal Cancer Screening  Never done      FBS All Patients q1 year No results found for: \"GLUCOSE\"   Glaucoma If at high risk q1 year    Pap If at high risk q1 year No recommendations at this time   Pap All Patients q2 year if indicated No recommendations at this time   Mammogram Age > 39 q1 year Health Maintenance   Topic Date Due    Mammogram  Discontinued      EKG All Patients One at initial exam    Vaccines:      Pneumococcal All Patients Once per lifetime Orders  placed or performed in visit on 03/26/18    Pneumococcal (Prevnar 13) (11090) (DX V03.82/Z23)      Influenza All Patients Annually No orders found for this or any previous visit.   Hepatitis B If at Risk 3 scheduled doses No orders found for this or any previous visit.         SUGGESTED VACCINATIONS - Influenza, Pneumococcal, Zoster, Tetanus     Immunization History   Administered Date(s) Administered    Covid-19 Vaccine Pfizer 30 mcg/0.3 ml 02/06/2021, 02/27/2021, 09/28/2021    Covid-19 Vaccine Pfizer Bivalent 30mcg/0.3mL 06/14/2023    Covid-19 Vaccine Pfizer Christiano-Sucrose 30 mcg/0.3 ml 06/17/2022    FLU VAC High Dose 65 YRS & Older PRSV Free (35828) 03/15/2022, 10/17/2022    FLU VAC QIV SPLIT 3 YRS AND OLDER (12709) 09/28/2017, 01/01/2022    FLUAD High Dose 65 yr and older (76205) 12/24/2018    High Dose Fluzone Influenza Vaccine, 65yr+ PF 0.5mL (05518) 10/17/2022    Influenza 01/01/2022    Influenza Virus Vaccine - Whole 01/01/2022    Pneumococcal (Prevnar 13) 01/01/2021   Deferred Date(s) Deferred    FLUAD High Dose 65 yr and older (53349) 03/26/2018    Pneumococcal (Prevnar 13) 03/26/2018         History/Other:    Chief Complaint Reviewed and Verified  No Further Nursing Notes to   Review  Medications Reviewed  Problem List Reviewed         Tobacco:  She has never smoked tobacco.    Current Outpatient Medications   Medication Sig Dispense Refill    mometasone 0.1 % External Ointment Apply 1 Application topically 2 (two) times daily. APPLY TO AFFECTED AREA      simvastatin 20 MG Oral Tab Take 1 tablet (20 mg total) by mouth nightly.      omeprazole 20 MG Oral Capsule Delayed Release Take 1 capsule (20 mg total) by mouth before breakfast.      triamcinolone 0.1 % External Cream APPLY UNDER BREST TWICE DAILY      atorvastatin 40 MG Oral Tab Take 1 tablet (40 mg total) by mouth daily.      ezetimibe 10 MG Oral Tab Take 1 tablet (10 mg total) by mouth daily.      metoprolol succinate ER 25 MG Oral Tablet 24 Hr  Take 1 tablet (25 mg total) by mouth daily.      FUROSEMIDE 40 MG Oral Tab TAKE ONE TABLET BY MOUTH ONE TIME DAILY 90 tablet 0    metoprolol tartrate 100 MG Oral Tab Take 1 tablet by mouth the evening before the CTA and 1 tablet the morning of the CTA. Hold Metoprolol Succinate the morning of the CTA.      insulin aspart 100 Units/mL Injection Solution TAKE 60 UNITS DAILY VIA INSULIN PUMP.      Calcipotriene 0.005 % External Solution APPLY TO AFFECTED AREA OR BODY TWICE A DAY      Cholecalciferol (VITAMIN D) 25 MCG (1000 UT) Oral Tab Take 25 mcg by mouth daily.      Insulin Infusion Pump Supplies (INSULIN PUMP SYRINGE RESERVOIR) Does not apply Misc as directed      Levothyroxine Sodium 125 MCG Oral Tab   4    Albuterol Sulfate HFA (PROAIR HFA) 108 (90 Base) MCG/ACT Inhalation Aero Soln Inhale 2 puffs into the lungs every 4 (four) hours as needed for Wheezing or Shortness of Breath. 1 Inhaler 1    NOVOLOG 100 UNIT/ML Subcutaneous Solution   4    Benazepril-Hydrochlorothiazide (LOTENSIN HCT) 10-12.5 MG Oral Tab Take 1 tablet by mouth daily.      Omeprazole 40 MG Oral Capsule Delayed Release Take 1 capsule (40 mg total) by mouth daily.           Review of Systems:  Review of Systems      Objective:   /60   Pulse 65   Ht 5' 2\" (1.575 m)   Wt 188 lb (85.3 kg)   SpO2 97%   BMI 34.39 kg/m²  Estimated body mass index is 34.39 kg/m² as calculated from the following:    Height as of this encounter: 5' 2\" (1.575 m).    Weight as of this encounter: 188 lb (85.3 kg).  Physical Exam      Assessment & Plan:   1. Medicare annual wellness visit, subsequent (Primary) having labs next month  2. Type 1 diabetes mellitus with hyperglycemia (HCC) on tandem insulin pump  3. Mixed hyperlipidemia on statin and zetia  4. NANCY on CPAP compliant with rx  5. Dysuria check cx  -     Urine Culture, Routine; Future; Expected date: 08/20/2024  6. Screening mammogram, encounter for -referred for diagnostic mammogram        No follow-ups on  file.    Alicia Haines MD, 8/20/2024, 11:38 AM

## 2024-08-30 ENCOUNTER — LAB ENCOUNTER (OUTPATIENT)
Dept: LAB | Facility: HOSPITAL | Age: 74
End: 2024-08-30
Attending: INTERNAL MEDICINE
Payer: MEDICARE

## 2024-08-30 DIAGNOSIS — L27.0 ALLERGIC DRUG RASH: Primary | ICD-10-CM

## 2024-08-30 DIAGNOSIS — I25.10 CAD (CORONARY ARTERY DISEASE): ICD-10-CM

## 2024-08-30 LAB
ALT SERPL-CCNC: 30 U/L
AST SERPL-CCNC: 26 U/L (ref ?–34)

## 2024-08-30 PROCEDURE — 84460 ALANINE AMINO (ALT) (SGPT): CPT

## 2024-08-30 PROCEDURE — 84450 TRANSFERASE (AST) (SGOT): CPT

## 2024-08-30 PROCEDURE — 36415 COLL VENOUS BLD VENIPUNCTURE: CPT

## 2024-10-02 ENCOUNTER — LAB ENCOUNTER (OUTPATIENT)
Dept: LAB | Facility: HOSPITAL | Age: 74
End: 2024-10-02
Attending: INTERNAL MEDICINE
Payer: MEDICARE

## 2024-10-02 DIAGNOSIS — E03.9 ACQUIRED HYPOTHYROIDISM: ICD-10-CM

## 2024-10-02 DIAGNOSIS — E10.59 TYPE 1 DIABETES MELLITUS WITH VASCULAR DISEASE (HCC): ICD-10-CM

## 2024-10-02 DIAGNOSIS — R74.8 ELEVATED LIVER ENZYMES: Primary | ICD-10-CM

## 2024-10-02 LAB
ALBUMIN SERPL-MCNC: 4.1 G/DL (ref 3.2–4.8)
ALBUMIN/GLOB SERPL: 1.5 {RATIO} (ref 1–2)
ALP LIVER SERPL-CCNC: 126 U/L
ALT SERPL-CCNC: 21 U/L
ANION GAP SERPL CALC-SCNC: 5 MMOL/L (ref 0–18)
AST SERPL-CCNC: 26 U/L (ref ?–34)
BASOPHILS # BLD AUTO: 0.05 X10(3) UL (ref 0–0.2)
BASOPHILS NFR BLD AUTO: 0.6 %
BILIRUB SERPL-MCNC: 0.7 MG/DL (ref 0.2–1.1)
BUN BLD-MCNC: 35 MG/DL (ref 9–23)
BUN/CREAT SERPL: 30.2 (ref 10–20)
CALCIUM BLD-MCNC: 9.4 MG/DL (ref 8.7–10.4)
CHLORIDE SERPL-SCNC: 102 MMOL/L (ref 98–112)
CHOLEST SERPL-MCNC: 151 MG/DL (ref ?–200)
CO2 SERPL-SCNC: 34 MMOL/L (ref 21–32)
CREAT BLD-MCNC: 1.16 MG/DL
DEPRECATED RDW RBC AUTO: 44 FL (ref 35.1–46.3)
EGFRCR SERPLBLD CKD-EPI 2021: 49 ML/MIN/1.73M2 (ref 60–?)
EOSINOPHIL # BLD AUTO: 0.6 X10(3) UL (ref 0–0.7)
EOSINOPHIL NFR BLD AUTO: 7.7 %
ERYTHROCYTE [DISTWIDTH] IN BLOOD BY AUTOMATED COUNT: 13.6 % (ref 11–15)
EST. AVERAGE GLUCOSE BLD GHB EST-MCNC: 171 MG/DL (ref 68–126)
FASTING PATIENT LIPID ANSWER: YES
FASTING STATUS PATIENT QL REPORTED: YES
GLOBULIN PLAS-MCNC: 2.7 G/DL (ref 2–3.5)
GLUCOSE BLD-MCNC: 133 MG/DL (ref 70–99)
HBA1C MFR BLD: 7.6 % (ref ?–5.7)
HCT VFR BLD AUTO: 35.9 %
HDLC SERPL-MCNC: 56 MG/DL (ref 40–59)
HGB BLD-MCNC: 12.1 G/DL
IMM GRANULOCYTES # BLD AUTO: 0.03 X10(3) UL (ref 0–1)
IMM GRANULOCYTES NFR BLD: 0.4 %
LDLC SERPL CALC-MCNC: 80 MG/DL (ref ?–100)
LYMPHOCYTES # BLD AUTO: 1.27 X10(3) UL (ref 1–4)
LYMPHOCYTES NFR BLD AUTO: 16.3 %
MCH RBC QN AUTO: 29.7 PG (ref 26–34)
MCHC RBC AUTO-ENTMCNC: 33.7 G/DL (ref 31–37)
MCV RBC AUTO: 88 FL
MONOCYTES # BLD AUTO: 0.78 X10(3) UL (ref 0.1–1)
MONOCYTES NFR BLD AUTO: 10 %
NEUTROPHILS # BLD AUTO: 5.07 X10 (3) UL (ref 1.5–7.7)
NEUTROPHILS # BLD AUTO: 5.07 X10(3) UL (ref 1.5–7.7)
NEUTROPHILS NFR BLD AUTO: 65 %
NONHDLC SERPL-MCNC: 95 MG/DL (ref ?–130)
OSMOLALITY SERPL CALC.SUM OF ELEC: 302 MOSM/KG (ref 275–295)
PLATELET # BLD AUTO: 243 10(3)UL (ref 150–450)
POTASSIUM SERPL-SCNC: 3.7 MMOL/L (ref 3.5–5.1)
PROT SERPL-MCNC: 6.8 G/DL (ref 5.7–8.2)
RBC # BLD AUTO: 4.08 X10(6)UL
SODIUM SERPL-SCNC: 141 MMOL/L (ref 136–145)
T3FREE SERPL-MCNC: 2.97 PG/ML (ref 2.4–4.2)
T4 FREE SERPL-MCNC: 1.8 NG/DL (ref 0.8–1.7)
TRIGL SERPL-MCNC: 77 MG/DL (ref 30–149)
TSI SER-ACNC: 0.71 MIU/ML (ref 0.55–4.78)
VLDLC SERPL CALC-MCNC: 12 MG/DL (ref 0–30)
WBC # BLD AUTO: 7.8 X10(3) UL (ref 4–11)

## 2024-10-02 PROCEDURE — 36415 COLL VENOUS BLD VENIPUNCTURE: CPT

## 2024-10-02 PROCEDURE — 83036 HEMOGLOBIN GLYCOSYLATED A1C: CPT

## 2024-10-02 PROCEDURE — 80061 LIPID PANEL: CPT

## 2024-10-02 PROCEDURE — 80053 COMPREHEN METABOLIC PANEL: CPT

## 2024-10-02 PROCEDURE — 84439 ASSAY OF FREE THYROXINE: CPT

## 2024-10-02 PROCEDURE — 83516 IMMUNOASSAY NONANTIBODY: CPT

## 2024-10-02 PROCEDURE — 84443 ASSAY THYROID STIM HORMONE: CPT

## 2024-10-02 PROCEDURE — 85025 COMPLETE CBC W/AUTO DIFF WBC: CPT

## 2024-10-02 PROCEDURE — 84481 FREE ASSAY (FT-3): CPT

## 2024-10-03 ENCOUNTER — OFFICE VISIT (OUTPATIENT)
Dept: HEMATOLOGY/ONCOLOGY | Facility: HOSPITAL | Age: 74
End: 2024-10-03
Attending: INTERNAL MEDICINE
Payer: MEDICARE

## 2024-10-03 VITALS
RESPIRATION RATE: 18 BRPM | WEIGHT: 188.38 LBS | BODY MASS INDEX: 34.67 KG/M2 | TEMPERATURE: 97 F | OXYGEN SATURATION: 99 % | HEIGHT: 62 IN

## 2024-10-03 DIAGNOSIS — Z85.3 ENCOUNTER FOR FOLLOW-UP SURVEILLANCE OF BREAST CANCER: ICD-10-CM

## 2024-10-03 DIAGNOSIS — Z12.31 ENCOUNTER FOR SCREENING MAMMOGRAM FOR MALIGNANT NEOPLASM OF BREAST: ICD-10-CM

## 2024-10-03 DIAGNOSIS — Z08 ENCOUNTER FOR FOLLOW-UP SURVEILLANCE OF BREAST CANCER: ICD-10-CM

## 2024-10-03 DIAGNOSIS — Z85.3 HISTORY OF LEFT BREAST CANCER: Primary | ICD-10-CM

## 2024-10-03 LAB — M2 MITOCHONDRIAL AB: 79.6 UNITS

## 2024-10-03 PROCEDURE — 99213 OFFICE O/P EST LOW 20 MIN: CPT | Performed by: INTERNAL MEDICINE

## 2024-10-03 NOTE — PROGRESS NOTES
HPI   Katelynn Cr is a 74 year old female here for follow up of   Encounter Diagnoses   Name Primary?    History of left breast cancer Yes    Encounter for follow-up surveillance of breast cancer     Encounter for screening mammogram for malignant neoplasm of breast    .    Patient with history of left breast cancer which was ER positive diagnosed in January 2009.  She was a stage IIA.  She underwent lumpectomy with sentinel lymph node biopsy on February 2009.  She was then treated with 6 cycles of CMF.  She was then started on adjuvant hormonal therapy with letrozole in May 2009.  Patient had radiation from July to August of 2009 to the breast with boost to lumpectomy cavity.    Patient also has osteopenia, and she does follow with endocrinology. She had been on alendronate.     Denies changes on SBE.       She has kept her weight stable.     ECOG PS 1.    Review of Systems:   Review of Systems   Constitutional:  Positive for fatigue. Negative for appetite change and unexpected weight change.   Respiratory:  Positive for shortness of breath (PADILLA with stairs). Negative for cough.    Cardiovascular:  Negative for chest pain.        Intermittent claudication    Gastrointestinal:  Negative for abdominal pain (sometimes, does have GERD.).   Genitourinary:  Positive for frequency.    Musculoskeletal:  Positive for arthralgias (L shoulder and other joints) and back pain (sciatica).        No bone pain.    On PT for L shoulder and sciatica   Neurological:  Positive for dizziness (once in a while with position changes.). Negative for headaches.   Hematological:  Negative for adenopathy.   Psychiatric/Behavioral:  Positive for sleep disturbance (has NANCY and is on CPAP).          Current Outpatient Medications   Medication Sig Dispense Refill    mometasone 0.1 % External Ointment Apply 1 Application topically 2 (two) times daily. APPLY TO AFFECTED AREA      simvastatin 40 MG Oral Tab Take 1 tablet (40 mg total) by mouth  nightly.      omeprazole 20 MG Oral Capsule Delayed Release Take 1 capsule (20 mg total) by mouth before breakfast.      triamcinolone 0.1 % External Cream APPLY UNDER BREST TWICE DAILY      ezetimibe 10 MG Oral Tab Take 1 tablet (10 mg total) by mouth daily.      metoprolol succinate ER 25 MG Oral Tablet 24 Hr Take 1 tablet (25 mg total) by mouth daily.      FUROSEMIDE 40 MG Oral Tab TAKE ONE TABLET BY MOUTH ONE TIME DAILY 90 tablet 0    metoprolol tartrate 100 MG Oral Tab Take 1 tablet by mouth the evening before the CTA and 1 tablet the morning of the CTA. Hold Metoprolol Succinate the morning of the CTA.      insulin aspart 100 Units/mL Injection Solution TAKE 60 UNITS DAILY VIA INSULIN PUMP.      Calcipotriene 0.005 % External Solution APPLY TO AFFECTED AREA OR BODY TWICE A DAY      Cholecalciferol (VITAMIN D) 25 MCG (1000 UT) Oral Tab Take 25 mcg by mouth daily.      Insulin Infusion Pump Supplies (INSULIN PUMP SYRINGE RESERVOIR) Does not apply Misc as directed      Levothyroxine Sodium 125 MCG Oral Tab   4    Albuterol Sulfate HFA (PROAIR HFA) 108 (90 Base) MCG/ACT Inhalation Aero Soln Inhale 2 puffs into the lungs every 4 (four) hours as needed for Wheezing or Shortness of Breath. 1 Inhaler 1    NOVOLOG 100 UNIT/ML Subcutaneous Solution   4    Benazepril-Hydrochlorothiazide (LOTENSIN HCT) 10-12.5 MG Oral Tab Take 1 tablet by mouth daily.      Omeprazole 40 MG Oral Capsule Delayed Release Take 1 capsule (40 mg total) by mouth daily.       Allergies:   Allergies   Allergen Reactions    Penicillins HIVES     Other reaction(s): PENICILLINS  Other reaction(s): Unknown    Clindamycin     Dust Mite Extract OTHER (SEE COMMENTS)     Nasal congestion    Sulfa Antibiotics     Rosuvastatin RASH       Past Medical History:    Ankle fracture, lateral malleolus, closed    Asthma (HCC)    Breast cancer (HCC)    Breast cancer of upper-outer quadrant of left female breast (HCC)    Breast cancer, left (HCC)    lumpectomy  2009    Breast cancer, left (HCC)    left breast re-excision, left axillary SLNB    Breast tenderness in female    CAD (coronary artery disease)    Carotid artery disease (HCC)    LEFT heart cath adn PCI 2010    Cataract    Deep vein thrombosis (HCC)    R arm    Diabetes mellitus (HCC)    Encounter for fitting of portacath    venous access; portacath placement, 2009    Encounter for imaging to assess osteopenia    Encounter for monitoring aromatase inhibitor therapy    Essential hypertension    GERD (gastroesophageal reflux disease)    H/O  section    x2    H/O vitrectomy    LEFT vitrectomy    Hip bursitis    left    Hyperlipidemia    Hypertension    Hypothyroidism    Malignant neoplasm of upper-outer quadrant of left breast in female, estrogen receptor positive (HCC)    Neuropathy    B/L feet    Osteoarthritis    B/L shoulders    Osteopenia of lumbar spine    Personal history of antineoplastic chemotherapy    Psoriasis    Sleep apnea    Squamous cell cancer of skin of left cheek    Thrombus    SVC thrombus; Thrombolysis 2009    Trigger finger of both hands    Type 1 diabetes mellitus (HCC)    Type 1 diabetes mellitus with hyperglycemia (HCC)    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 Ellis    Cataract extraction w/  intraocular lens implant Right 2019    Dr. Ellis    Chemotherapy      Cholecystectomy      Lumpectomy left Left 2009    Needle biopsy right      Needle core biopsy, breast (dmg) Right 10/07/2009    RIGHT breast nodules; mammatome core needle biopsies;     Other surgical history  2024    Endoscopic right maxillary antrostomy with tissue removal, as well as right anterior ethmoidectomy, and placement of Propel implant.    Radiation left       Social History      Socioeconomic History    Marital status:    Tobacco Use    Smoking status: Never    Smokeless tobacco: Never   Vaping Use    Vaping status: Never Used   Substance and Sexual Activity    Alcohol use: Yes     Alcohol/week: 1.0 - 2.0 standard drink of alcohol     Types: 1 - 2 Glasses of wine per week     Comment: weekly    Drug use: No   Other Topics Concern    Caffeine Concern Yes     Comment: 4 cups coffee daily       Family History   Problem Relation Age of Onset    Heart Disease Mother         CAD - Premature    Heart Disease Father         (cause of death)    Breast Cancer Self 60         PHYSICAL EXAM:    Temp 97.4 °F (36.3 °C) (Oral)   Resp 18   Ht 1.575 m (5' 2\")   Wt 85.5 kg (188 lb 6.4 oz)   SpO2 99%   BMI 34.46 kg/m²   Wt Readings from Last 6 Encounters:   10/03/24 85.5 kg (188 lb 6.4 oz)   08/20/24 85.3 kg (188 lb)   08/05/24 85.3 kg (188 lb)   06/24/24 85.3 kg (188 lb)   05/30/24 80.7 kg (178 lb)   04/25/24 83.5 kg (184 lb)     Physical Exam  General: Patient is alert, not in acute distress.  HEENT: EOMs intact. PERRL.   Neck: No JVD. No palpable lymphadenopathy. Neck is supple.  Chest: Clear to auscultation.  Breasts: R breast no masses.  L breast with post op and post RT changes with neovascularization, no masses.  Tender at B ribs below the breasts.    Heart: Regular rate and rhythm.   Abdomen: Soft, non tender with good bowel sounds.  Extremities: No edema.  Neurological: Grossly intact.   Lymphatics: There is no palpable lymphadenopathy throughout in the cervical, supraclavicular, axillary, or inguinal regions.  Psych/Depression: nl        ASSESSMENT/PLAN:     1. History of left breast cancer    2. Encounter for follow-up surveillance of breast cancer    3. Encounter for screening mammogram for malignant neoplasm of breast        Patient has completed > 10 yrs of AI, discontinued anastrozole in Sept of 2022.     Due for screening mammogram in October 2024.  Order given by her PCP.   Last mammogram was October 12, 2023, this was limited by the patient's mobility.  However findings were felt to be benign, this was a BI-RADS 2.    Osteopenia:  Follow up with Dr. Katz for management.      Follow up in 1 year or sooner pending mammogram.       No orders of the defined types were placed in this encounter.    MDM low    Results From Past 48 Hours:  Recent Results (from the past 48 hour(s))   CBC With Differential With Platelet    Collection Time: 10/02/24  9:56 AM   Result Value Ref Range    WBC 7.8 4.0 - 11.0 x10(3) uL    RBC 4.08 3.80 - 5.30 x10(6)uL    HGB 12.1 12.0 - 16.0 g/dL    HCT 35.9 35.0 - 48.0 %    MCV 88.0 80.0 - 100.0 fL    MCH 29.7 26.0 - 34.0 pg    MCHC 33.7 31.0 - 37.0 g/dL    RDW-SD 44.0 35.1 - 46.3 fL    RDW 13.6 11.0 - 15.0 %    .0 150.0 - 450.0 10(3)uL    Neutrophil Absolute Prelim 5.07 1.50 - 7.70 x10 (3) uL    Neutrophil Absolute 5.07 1.50 - 7.70 x10(3) uL    Lymphocyte Absolute 1.27 1.00 - 4.00 x10(3) uL    Monocyte Absolute 0.78 0.10 - 1.00 x10(3) uL    Eosinophil Absolute 0.60 0.00 - 0.70 x10(3) uL    Basophil Absolute 0.05 0.00 - 0.20 x10(3) uL    Immature Granulocyte Absolute 0.03 0.00 - 1.00 x10(3) uL    Neutrophil % 65.0 %    Lymphocyte % 16.3 %    Monocyte % 10.0 %    Eosinophil % 7.7 %    Basophil % 0.6 %    Immature Granulocyte % 0.4 %   Comp Metabolic Panel (14)    Collection Time: 10/02/24  9:56 AM   Result Value Ref Range    Glucose 133 (H) 70 - 99 mg/dL    Sodium 141 136 - 145 mmol/L    Potassium 3.7 3.5 - 5.1 mmol/L    Chloride 102 98 - 112 mmol/L    CO2 34.0 (H) 21.0 - 32.0 mmol/L    Anion Gap 5 0 - 18 mmol/L    BUN 35 (H) 9 - 23 mg/dL    Creatinine 1.16 (H) 0.55 - 1.02 mg/dL    BUN/CREA Ratio 30.2 (H) 10.0 - 20.0    Calcium, Total 9.4 8.7 - 10.4 mg/dL    Calculated Osmolality 302 (H) 275 - 295 mOsm/kg    eGFR-Cr 49 (L) >=60 mL/min/1.73m2    ALT 21 10 - 49 U/L    AST 26 <34 U/L    Alkaline Phosphatase 126 55 - 142 U/L    Bilirubin, Total 0.7 0.2 - 1.1  mg/dL    Total Protein 6.8 5.7 - 8.2 g/dL    Albumin 4.1 3.2 - 4.8 g/dL    Globulin  2.7 2.0 - 3.5 g/dL    A/G Ratio 1.5 1.0 - 2.0    Patient Fasting for CMP? Yes    Hemoglobin A1C    Collection Time: 10/02/24  9:56 AM   Result Value Ref Range    HgbA1C 7.6 (H) <5.7 %    Estimated Average Glucose 171 (H) 68 - 126 mg/dL   Lipid Panel    Collection Time: 10/02/24  9:56 AM   Result Value Ref Range    Cholesterol, Total 151 <200 mg/dL    HDL Cholesterol 56 40 - 59 mg/dL    Triglycerides 77 30 - 149 mg/dL    LDL Cholesterol 80 <100 mg/dL    VLDL 12 0 - 30 mg/dL    Non HDL Chol 95 <130 mg/dL    Patient Fasting for Lipid? Yes    Free T4, (Free Thyroxine)    Collection Time: 10/02/24  9:56 AM   Result Value Ref Range    Free T4 1.8 (H) 0.8 - 1.7 ng/dL   Free T3 (Triiodothryronine)    Collection Time: 10/02/24  9:56 AM   Result Value Ref Range    T3 Free 2.97 2.40 - 4.20 pg/mL   Assay, Thyroid Stim Hormone    Collection Time: 10/02/24  9:56 AM   Result Value Ref Range    TSH 0.708 0.550 - 4.780 mIU/mL       Narrative   PROCEDURE: CT CARDIAC OVER READ     COMPARISON: [facility], CT CHEST ABD PELV W CONTRAST, 7/28/2010, 9:59 AM.  [facility], CT CHEST ABD PELV W CONTRST, 7/28/2009, 3:12 PM.  [facility], CT NECK CHEST W CONTRAST, 5/29/2009,  12:38 PM.     INDICATIONS: Hypertension, Hyperlipidemia, mixed Fatigue, coronary artery disease           TECHNIQUE:   CT coronary artery study was performed. The raw data from that study was reprocessed into images of the mediastinum and lung fields in the area that was scanned. Automated exposure control for dose reduction was used. Adjustment of the mA  and/or kV was done based on the patient's size. Iterative reconstruction technique for dose reduction was employed. The cardiac portion of the study will be reported separately by the cardiologist.       FINDINGS:    CARDIAC: Please see the cardiologist report for further  details.  Coronary artery calcification noted.  VASCULATURE: The visualized portions of the thoracic aorta are grossly unremarkable in appearance.    LUNGS/PLEURA: No airspace consolidation, pleural effusion, or pneumothorax is detected in the imaged region.  Bibasilar linear subpleural scarring versus atelectasis.  AIRWAYS: The distal airways appear grossly unremarkable.  MEDIASTINUM/LISA: No mass or lymphadenopathy within the imaged volume.    CHEST WALL: Partially imaged probable postsurgical change left breast.  LIMITED ABDOMEN: Within the parameters of the phase of contrast-enhancement, the included portion of the upper abdomen is unremarkable.    BONES: No bony lesion or fracture is seen.    OTHER: Negative.                    Impression   CONCLUSION:     Cardiac over-read performed. Please see the separately dictated cardiologist report for further details.  No significant extracardiac abnormality.           Dictated by (CST): Fabien Eaton MD on 5/30/2024 at 2:05 PM      Finalized by (CST): Fabien Eaton MD on 5/30/2024 at 2:11 PM         Imaging & Referrals:  None

## 2024-11-27 ENCOUNTER — HOSPITAL ENCOUNTER (OUTPATIENT)
Dept: MAMMOGRAPHY | Facility: HOSPITAL | Age: 74
Discharge: HOME | End: 2024-11-27
Attending: INTERNAL MEDICINE
Payer: MEDICARE

## 2024-11-27 DIAGNOSIS — Z85.3 HISTORY OF BREAST CANCER: ICD-10-CM

## 2024-11-27 PROCEDURE — 77066 DX MAMMO INCL CAD BI: CPT | Performed by: INTERNAL MEDICINE

## 2024-11-27 PROCEDURE — 77062 BREAST TOMOSYNTHESIS BI: CPT | Performed by: INTERNAL MEDICINE

## 2025-01-11 ENCOUNTER — LAB ENCOUNTER (OUTPATIENT)
Dept: LAB | Facility: HOSPITAL | Age: 75
End: 2025-01-11
Attending: INTERNAL MEDICINE
Payer: MEDICARE

## 2025-01-11 DIAGNOSIS — E10.59: Primary | ICD-10-CM

## 2025-01-11 LAB
ALBUMIN SERPL-MCNC: 4.2 G/DL (ref 3.2–4.8)
ALBUMIN/GLOB SERPL: 1.8 {RATIO} (ref 1–2)
ALP LIVER SERPL-CCNC: 127 U/L
ALT SERPL-CCNC: 22 U/L
ANION GAP SERPL CALC-SCNC: 7 MMOL/L (ref 0–18)
AST SERPL-CCNC: 21 U/L (ref ?–34)
BILIRUB SERPL-MCNC: 0.5 MG/DL (ref 0.2–1.1)
BUN BLD-MCNC: 33 MG/DL (ref 9–23)
BUN/CREAT SERPL: 27.3 (ref 10–20)
CALCIUM BLD-MCNC: 9.4 MG/DL (ref 8.7–10.4)
CHLORIDE SERPL-SCNC: 101 MMOL/L (ref 98–112)
CHOLEST SERPL-MCNC: 150 MG/DL (ref ?–200)
CO2 SERPL-SCNC: 32 MMOL/L (ref 21–32)
CREAT BLD-MCNC: 1.21 MG/DL
CREAT UR-SCNC: 52.2 MG/DL
EGFRCR SERPLBLD CKD-EPI 2021: 47 ML/MIN/1.73M2 (ref 60–?)
EST. AVERAGE GLUCOSE BLD GHB EST-MCNC: 174 MG/DL (ref 68–126)
FASTING PATIENT LIPID ANSWER: YES
FASTING STATUS PATIENT QL REPORTED: YES
GLOBULIN PLAS-MCNC: 2.4 G/DL (ref 2–3.5)
GLUCOSE BLD-MCNC: 158 MG/DL (ref 70–99)
HBA1C MFR BLD: 7.7 % (ref ?–5.7)
HDLC SERPL-MCNC: 62 MG/DL (ref 40–59)
LDLC SERPL CALC-MCNC: 75 MG/DL (ref ?–100)
MICROALBUMIN UR-MCNC: <0.3 MG/DL
NONHDLC SERPL-MCNC: 88 MG/DL (ref ?–130)
OSMOLALITY SERPL CALC.SUM OF ELEC: 301 MOSM/KG (ref 275–295)
POTASSIUM SERPL-SCNC: 3.7 MMOL/L (ref 3.5–5.1)
PROT SERPL-MCNC: 6.6 G/DL (ref 5.7–8.2)
SODIUM SERPL-SCNC: 140 MMOL/L (ref 136–145)
TRIGL SERPL-MCNC: 63 MG/DL (ref 30–149)
VLDLC SERPL CALC-MCNC: 10 MG/DL (ref 0–30)

## 2025-01-11 PROCEDURE — 80053 COMPREHEN METABOLIC PANEL: CPT

## 2025-01-11 PROCEDURE — 82570 ASSAY OF URINE CREATININE: CPT

## 2025-01-11 PROCEDURE — 80061 LIPID PANEL: CPT

## 2025-01-11 PROCEDURE — 36415 COLL VENOUS BLD VENIPUNCTURE: CPT

## 2025-01-11 PROCEDURE — 83036 HEMOGLOBIN GLYCOSYLATED A1C: CPT

## 2025-01-11 PROCEDURE — 82043 UR ALBUMIN QUANTITATIVE: CPT

## 2025-01-14 ENCOUNTER — OFFICE VISIT (OUTPATIENT)
Dept: OTOLARYNGOLOGY | Facility: CLINIC | Age: 75
End: 2025-01-14

## 2025-01-14 DIAGNOSIS — J01.90 ACUTE BACTERIAL SINUSITIS: Primary | ICD-10-CM

## 2025-01-14 DIAGNOSIS — K21.9 GASTROESOPHAGEAL REFLUX DISEASE, UNSPECIFIED WHETHER ESOPHAGITIS PRESENT: ICD-10-CM

## 2025-01-14 DIAGNOSIS — B96.89 ACUTE BACTERIAL SINUSITIS: Primary | ICD-10-CM

## 2025-01-14 PROCEDURE — 99213 OFFICE O/P EST LOW 20 MIN: CPT | Performed by: SPECIALIST

## 2025-01-14 RX ORDER — AZITHROMYCIN 250 MG/1
TABLET, FILM COATED ORAL
Qty: 6 TABLET | Refills: 1 | Status: SHIPPED | OUTPATIENT
Start: 2025-01-14

## 2025-01-15 NOTE — PATIENT INSTRUCTIONS
Patient on azithromycin for your bilateral sinusitis.  You can repeat the medication x 1 if needed.  I also gave you an antireflux dietNo greasy foods, spicy foods, chocolate, caffeine, alcohol, spearmint, peppermint, lemon, lime, orange, grapefruit, tomatoes.  Don't eat 2 hours before bedtime  Elevate the head of bed as this also can cause a lot of phlegm especially in the morning.  Please call or follow-up if your symptoms do not completely resolved.

## 2025-01-15 NOTE — PROGRESS NOTES
Katelynn Cr is a 74 year old female.   Chief Complaint   Patient presents with    Follow - Up     Patient here for check-up, per pt has congestion in the morning      HPI:   Patient here is having a lot of phlegm especially in the morning.    Current Outpatient Medications   Medication Sig Dispense Refill    azithromycin (ZITHROMAX Z-JASSI) 250 MG Oral Tab Take 1 by oral route every day for 5 days. 2 tablets today. 6 tablet 1    mometasone 0.1 % External Ointment Apply 1 Application topically 2 (two) times daily. APPLY TO AFFECTED AREA      simvastatin 40 MG Oral Tab Take 1 tablet (40 mg total) by mouth nightly.      omeprazole 20 MG Oral Capsule Delayed Release Take 1 capsule (20 mg total) by mouth before breakfast.      triamcinolone 0.1 % External Cream APPLY UNDER BREST TWICE DAILY      ezetimibe 10 MG Oral Tab Take 1 tablet (10 mg total) by mouth daily.      metoprolol succinate ER 25 MG Oral Tablet 24 Hr Take 1 tablet (25 mg total) by mouth daily.      FUROSEMIDE 40 MG Oral Tab TAKE ONE TABLET BY MOUTH ONE TIME DAILY 90 tablet 0    metoprolol tartrate 100 MG Oral Tab Take 1 tablet by mouth the evening before the CTA and 1 tablet the morning of the CTA. Hold Metoprolol Succinate the morning of the CTA.      insulin aspart 100 Units/mL Injection Solution TAKE 60 UNITS DAILY VIA INSULIN PUMP.      Calcipotriene 0.005 % External Solution APPLY TO AFFECTED AREA OR BODY TWICE A DAY      Cholecalciferol (VITAMIN D) 25 MCG (1000 UT) Oral Tab Take 25 mcg by mouth daily.      Insulin Infusion Pump Supplies (INSULIN PUMP SYRINGE RESERVOIR) Does not apply Misc as directed      Levothyroxine Sodium 125 MCG Oral Tab   4    Albuterol Sulfate HFA (PROAIR HFA) 108 (90 Base) MCG/ACT Inhalation Aero Soln Inhale 2 puffs into the lungs every 4 (four) hours as needed for Wheezing or Shortness of Breath. 1 Inhaler 1    NOVOLOG 100 UNIT/ML Subcutaneous Solution   4    Benazepril-Hydrochlorothiazide (LOTENSIN HCT) 10-12.5 MG Oral  Tab Take 1 tablet by mouth daily.      Omeprazole 40 MG Oral Capsule Delayed Release Take 1 capsule (40 mg total) by mouth daily.        Past Medical History:    Ankle fracture, lateral malleolus, closed    Asthma (HCC)    Breast cancer (HCC)    Breast cancer of upper-outer quadrant of left female breast (HCC)    Breast cancer, left (HCC)    lumpectomy 2009    Breast cancer, left (HCC)    left breast re-excision, left axillary SLNB    Breast tenderness in female    CAD (coronary artery disease)    Carotid artery disease (HCC)    LEFT heart cath adn PCI 2010    Cataract    Deep vein thrombosis (HCC)    R arm    Diabetes mellitus (HCC)    Encounter for fitting of portacath    venous access; portacath placement, 2009    Encounter for imaging to assess osteopenia    Encounter for monitoring aromatase inhibitor therapy    Essential hypertension    GERD (gastroesophageal reflux disease)    H/O  section    x2    H/O vitrectomy    LEFT vitrectomy    Hip bursitis    left    Hyperlipidemia    Hypertension    Hypothyroidism    Malignant neoplasm of upper-outer quadrant of left breast in female, estrogen receptor positive (HCC)    Neuropathy    B/L feet    Osteoarthritis    B/L shoulders    Osteopenia of lumbar spine    Personal history of antineoplastic chemotherapy    Psoriasis    Sleep apnea    Squamous cell cancer of skin of left cheek    Thrombus    SVC thrombus; Thrombolysis 2009    Trigger finger of both hands    Type 1 diabetes mellitus (HCC)    Type 1 diabetes mellitus with hyperglycemia (HCC)    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)    Visual impairment    glasses, contacts      Social History:  Social History     Socioeconomic History    Marital status:    Tobacco Use    Smoking status: Never    Smokeless tobacco: Never   Vaping Use    Vaping status: Never Used   Substance and Sexual Activity    Alcohol use: Yes     Alcohol/week: 1.0 - 2.0 standard drink  of alcohol     Types: 1 - 2 Glasses of wine per week     Comment: weekly    Drug use: No   Other Topics Concern    Caffeine Concern Yes     Comment: 4 cups coffee daily        REVIEW OF SYSTEMS:   GENERAL HEALTH: feels well otherwise  GENERAL : denies fever, chills, sweats, weight loss, weight gain  SKIN: denies any unusual skin lesions or rashes  RESPIRATORY: denies shortness of breath with exertion  NEURO: denies headaches    EXAM:   There were no vitals taken for this visit.  System Details   Skin Inspection - Normal.   Constitutional Overall appearance - Normal.   Head/Face Facial features - Normal. Eyebrows - Normal. Skull - Normal.   Eyes Conjunctiva - Right: Normal, Left: Normal. Pupil - Right: Normal, Left: Normal.    Ears Inspection - Right: Normal, Left: Normal.   Canal - Right: Normal, Left: Normal.   TM - Right: Normal, Left: Normal.   Nasal External nose - Normal.   Nasal septum - Normal.  Turbinates -congestion, and thick bilateral nasal purulence   Oral/Oropharynx Lips - Normal, Tonsils - Normal, Tongue - Normal    Neck Exam Inspection - Normal. Palpation - Normal. Parotid gland - Normal. Thyroid gland - Normal.   Larynx Difficult mirror examination.  No obvious retained secretions.   Lymph Detail Submental. Submandibular. Anterior cervical. Posterior cervical. Supraclavicular all without enlargement   Psychiatric Orientation - Oriented to time, place, person & situation. Appropriate mood and affect.   Neurological Memory - Normal. Cranial nerves - Cranial nerves II through XII grossly intact.     ASSESSMENT AND PLAN:   1. Acute bacterial sinusitis  Patient placed on azithromycin.  Can repeat x 1 if needed.    2. Gastroesophageal reflux disease, unspecified whether esophagitis present  History of reflux disease.  Patient currently not on any medication for it.  Was given an antireflux diet.  Call or follow-up if symptoms do not resolve.      The patient indicates understanding of these issues and  agrees to the plan.      No Bocanegra MD  1/14/2025  8:11 PM

## 2025-01-20 PROBLEM — R26.81 GAIT INSTABILITY: Status: ACTIVE | Noted: 2022-02-21

## 2025-01-20 PROBLEM — S92.919A FRACTURE OF PHALANX OF TOE: Status: ACTIVE | Noted: 2023-04-10

## 2025-01-20 PROBLEM — M54.32 LEFT SIDED SCIATICA: Status: ACTIVE | Noted: 2018-09-18

## 2025-01-20 PROBLEM — T14.8XXA FRACTURE OF BONE: Status: ACTIVE | Noted: 2022-08-22

## 2025-02-03 PROBLEM — M25.579 PAIN IN JOINT INVOLVING ANKLE AND FOOT: Status: ACTIVE | Noted: 2021-12-20

## 2025-02-03 PROBLEM — E11.49 NEUROLOGIC DISORDER ASSOCIATED WITH DIABETES MELLITUS (HCC): Status: ACTIVE | Noted: 2024-12-23

## 2025-02-03 PROBLEM — M77.40 METATARSALGIA: Status: ACTIVE | Noted: 2021-12-20

## 2025-02-03 PROBLEM — G89.29 CHRONIC RIGHT SHOULDER PAIN: Status: ACTIVE | Noted: 2023-08-07

## 2025-02-03 PROBLEM — I73.9 PERIPHERAL VASCULAR DISEASE: Status: ACTIVE | Noted: 2023-04-10

## 2025-02-03 PROBLEM — G62.9 PERIPHERAL NERVE DISEASE: Status: ACTIVE | Noted: 2022-08-22

## 2025-02-03 PROBLEM — M72.2 PLANTAR FASCIITIS: Status: ACTIVE | Noted: 2022-08-22

## 2025-02-03 PROBLEM — M25.511 CHRONIC RIGHT SHOULDER PAIN: Status: ACTIVE | Noted: 2023-08-07

## 2025-02-05 ENCOUNTER — MED REC SCAN ONLY (OUTPATIENT)
Dept: INTERNAL MEDICINE CLINIC | Facility: CLINIC | Age: 75
End: 2025-02-05

## 2025-02-13 ENCOUNTER — TELEPHONE (OUTPATIENT)
Dept: PULMONOLOGY | Facility: CLINIC | Age: 75
End: 2025-02-13

## 2025-02-13 NOTE — TELEPHONE ENCOUNTER
Patient is requesting to only see Dr. Bocanegra for breathing issues. Patient declined the next soonest date please call

## 2025-02-18 RX ORDER — FUROSEMIDE 40 MG/1
40 TABLET ORAL DAILY
Qty: 90 TABLET | Refills: 0 | Status: SHIPPED | OUTPATIENT
Start: 2025-02-18

## 2025-02-25 ENCOUNTER — OFFICE VISIT (OUTPATIENT)
Dept: OTOLARYNGOLOGY | Facility: CLINIC | Age: 75
End: 2025-02-25

## 2025-02-25 DIAGNOSIS — J01.90 ACUTE BACTERIAL SINUSITIS: Primary | ICD-10-CM

## 2025-02-25 DIAGNOSIS — J01.01 ACUTE RECURRENT MAXILLARY SINUSITIS: ICD-10-CM

## 2025-02-25 DIAGNOSIS — B96.89 ACUTE BACTERIAL SINUSITIS: Primary | ICD-10-CM

## 2025-02-25 PROCEDURE — 99213 OFFICE O/P EST LOW 20 MIN: CPT | Performed by: SPECIALIST

## 2025-02-26 NOTE — PATIENT INSTRUCTIONS
A culture was taken from your right middle meatus.  I will of course call you with the results.  Pending these an antibiotic may be considered.  Follow-up in 3 to 4 weeks time, sooner if problems.

## 2025-02-27 ENCOUNTER — OFFICE VISIT (OUTPATIENT)
Dept: PULMONOLOGY | Facility: CLINIC | Age: 75
End: 2025-02-27

## 2025-02-27 VITALS — HEART RATE: 77 BPM | OXYGEN SATURATION: 100 %

## 2025-02-27 DIAGNOSIS — R06.00 DYSPNEA, UNSPECIFIED TYPE: ICD-10-CM

## 2025-02-27 DIAGNOSIS — R06.09 DYSPNEA ON EXERTION: ICD-10-CM

## 2025-02-27 DIAGNOSIS — G47.33 OSA ON CPAP: Primary | ICD-10-CM

## 2025-02-27 PROCEDURE — 99214 OFFICE O/P EST MOD 30 MIN: CPT | Performed by: INTERNAL MEDICINE

## 2025-02-27 NOTE — PROGRESS NOTES
I left a message for the patient. Being that the culture is negative, I would recommend saline irrigation with either a premixed or Tomas med system.  Patient advised to use distilled water only.  I asked her to follow up in 4 - 6 weeks time.

## 2025-02-27 NOTE — PROGRESS NOTES
Dear Suzi:           As you know, Katelynn Cr is a 74-year-old female who I am now evaluating for dyspnea on exertion.       HISTORY OF PRESENT ILLNESS: The patient has a mild uncomfortable awareness of her breathing when ambulating or exerting herself.  She has negative recent stress testing with normal ejection fraction.  Her prior echo was unrevealing.  She has diabetes mellitus and obstructive sleep apnea and a sleep apnea is very well-controlled with average daily usage of CPAP of 8 hours and residual events of 1/h.  She denies chest pain, pleurisy, hemoptysis, cough, wheezing, TB exposure.  She has a distant history of breast cancer and had a port at her superior vena cava which was associated with thrombus and the port was subsequently removed.  Her weight has increased 7 to 8 pounds over the last several months.  Her last chest x-ray from a year and a half ago was unrevealing.  She is a lifetime non-smoker.    PAST MEDICAL AND SURGICAL HISTORY:   1.  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 Ear nose and throat normal. Bowel normal. Bladder function normal. No depression. Thyroid disease. No lymphatic system concerns.  No rash. Muscles and joints notable for spinal stenosis. No weight loss no weight gain.    PHYSICAL EXAMINATION: Physical Examination:  Vital signs normal. HEENT examination is unremarkable with pupils equal round and reactive to light and accommodation. Neck without adenopathy,  thyromegaly, JVD nor bruit. Lungs clear to auscultation and percussion. Cardiac regular rate and rhythm no murmur. Abdomen nontender, without hepatosplenomegaly and no mass appreciable. Extremities and Musculoskeletal without clubbing cyanosis nor edema, and mobility acceptable. Neurologic grossly intact with symmetric tone and strength and reflex.    LABORATORY: nuclear stress-negative for ischemia    ASSESSMENT AND PLAN:  PROBLEM 1.  Dyspnea on exertion-the etiology is uncertain.  Her lung examination is unrevealing and her chest x-ray from a year and a half ago was normal and she is a lifetime non-smoker.  She has had cardiac evaluation which likewise has been unrevealing.  She has gained weight and has obesity at baseline and would benefit from weight loss.  She has had prior radiation to the chest associated with her breast cancer and certainly could have mild intrinsic lung injury on that basis.  There is no evidence of anemia, and her most recent thyroid function is normal.    RECOMMENDATIONS:  1.  Full PFT  2.  Chest x-ray  3.  D-dimer  4.  BNP  5.  Return to see me at the 4 to 6-month interval or sooner if needed and contact me immediately if new trouble in the meantime  6.  Weight loss    I am delighted to assist in Katelynn's care.            With warmest regards,     Felton Bocanegra MD  Medical Director, Critical Care, [facility]  Medical Director, [facility]

## 2025-03-03 PROBLEM — M75.111 INCOMPLETE TEAR OF RIGHT ROTATOR CUFF: Status: ACTIVE | Noted: 2025-03-03

## 2025-03-13 ENCOUNTER — HOSPITAL ENCOUNTER (OUTPATIENT)
Dept: RESPIRATORY THERAPY | Facility: HOSPITAL | Age: 75
Discharge: HOME | End: 2025-03-13
Attending: INTERNAL MEDICINE
Payer: MEDICARE

## 2025-03-13 PROBLEM — R06.09 DYSPNEA ON EXERTION: Status: ACTIVE | Noted: 2025-03-13

## 2025-03-13 PROCEDURE — 94726 PLETHYSMOGRAPHY LUNG VOLUMES: CPT

## 2025-03-13 PROCEDURE — 94060 EVALUATION OF WHEEZING: CPT

## 2025-03-13 PROCEDURE — 94729 DIFFUSING CAPACITY: CPT

## 2025-03-13 NOTE — PROCEDURES
PULMONARY FUNCTION TESTING INTERPRETATION        Spirometry:  Forced vital capacity (FVC) is: decreased   Forced expiratory volume in 1 second (FEV1) is: decreased   FEV1/FVC ratio: normal   Significant bronchodilator response? Yes         Flow Volume Loop:  Normal        Lung Volume:  Total lung capacity (TLC) is: normal   Residual volume (RV) is: increased        Diffusing Capacity:  Borderline normal         Interpretation:  Moderate obstructive ventilatory pattern.  There is significant bronchodilator response.  Mild air trapping is present.  Borderline decrease in diffusing capacity maybe seen in conditions such as emphysema, interstitial lung disease, pulmonary vascular disorders, anemia.   Correlate clinically.  The PFT raw data is available in EPIC EMR under CHART REVIEW under the PROCEDURES tab. Please click on the the PFT and there should be an attached hyperlink which shows the attached test when clicked on.       Electronically signed by    MEHRDAD Catalan DO, MPH  Pulmonary Critical Care Medicine  [facility] Pulmonary and Critical Care Medicine

## 2025-03-14 ENCOUNTER — LAB ENCOUNTER (OUTPATIENT)
Dept: LAB | Facility: HOSPITAL | Age: 75
End: 2025-03-14
Attending: INTERNAL MEDICINE
Payer: MEDICARE

## 2025-03-14 ENCOUNTER — HOSPITAL ENCOUNTER (OUTPATIENT)
Dept: GENERAL RADIOLOGY | Facility: HOSPITAL | Age: 75
Discharge: HOME | End: 2025-03-14
Attending: INTERNAL MEDICINE
Payer: MEDICARE

## 2025-03-14 DIAGNOSIS — R06.09 DYSPNEA ON EXERTION: ICD-10-CM

## 2025-03-14 DIAGNOSIS — R06.00 DYSPNEA, UNSPECIFIED TYPE: ICD-10-CM

## 2025-03-14 LAB
BNP SERPL-MCNC: 68 PG/ML (ref ?–100)
D DIMER PPP FEU-MCNC: 0.6 UG/ML FEU (ref ?–0.75)

## 2025-03-14 PROCEDURE — 83880 ASSAY OF NATRIURETIC PEPTIDE: CPT

## 2025-03-14 PROCEDURE — 71046 X-RAY EXAM CHEST 2 VIEWS: CPT | Performed by: INTERNAL MEDICINE

## 2025-03-14 PROCEDURE — 85379 FIBRIN DEGRADATION QUANT: CPT

## 2025-03-14 PROCEDURE — 36415 COLL VENOUS BLD VENIPUNCTURE: CPT

## 2025-03-18 ENCOUNTER — OFFICE VISIT (OUTPATIENT)
Dept: OTOLARYNGOLOGY | Facility: CLINIC | Age: 75
End: 2025-03-18

## 2025-03-18 DIAGNOSIS — J34.89 INTRANASAL SYNECHIAE: ICD-10-CM

## 2025-03-18 DIAGNOSIS — J01.01 ACUTE RECURRENT MAXILLARY SINUSITIS: Primary | ICD-10-CM

## 2025-03-18 PROCEDURE — 99213 OFFICE O/P EST LOW 20 MIN: CPT | Performed by: SPECIALIST
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
## 2025-03-18 NOTE — PATIENT INSTRUCTIONS
Please try the NeilMed irrigation with hypertonic saline and distilled water.  Follow-up in 3 months time, sooner if problems.

## 2025-03-19 ENCOUNTER — TELEPHONE (OUTPATIENT)
Dept: PULMONOLOGY | Facility: CLINIC | Age: 75
End: 2025-03-19

## 2025-03-19 DIAGNOSIS — R94.2 ABNORMAL PFTS (PULMONARY FUNCTION TESTS): ICD-10-CM

## 2025-03-19 DIAGNOSIS — R06.00 DYSPNEA, UNSPECIFIED TYPE: Primary | ICD-10-CM

## 2025-03-19 NOTE — TELEPHONE ENCOUNTER
----- Message from Felton Bocanegra sent at 3/13/2025  5:56 PM CDT -----  RN, please call the patient to let her know that her pulmonary function test showed very mild abnormality with suggestion of minimal obstruction and minimal restriction, possibly related to body habitus.  Please send an alpha-1 antitrypsin level as she is a lifetime non-smoker.  Please facilitate.  Dion DAVID

## 2025-03-21 ENCOUNTER — TELEPHONE (OUTPATIENT)
Dept: PULMONOLOGY | Facility: CLINIC | Age: 75
End: 2025-03-21

## 2025-03-21 NOTE — TELEPHONE ENCOUNTER
Received order for PAP supplies from [facility] via [place]. Placed in Dr Hawkins's folder for signature

## 2025-03-26 NOTE — TELEPHONE ENCOUNTER
Order for CPAP supplies signed and submitted to [facility] via [email or web address]. Sent to scanning.

## 2025-03-28 ENCOUNTER — LAB ENCOUNTER (OUTPATIENT)
Dept: LAB | Facility: HOSPITAL | Age: 75
End: 2025-03-28
Attending: INTERNAL MEDICINE
Payer: MEDICARE

## 2025-03-28 DIAGNOSIS — R94.2 ABNORMAL PFTS (PULMONARY FUNCTION TESTS): ICD-10-CM

## 2025-03-28 DIAGNOSIS — R06.00 DYSPNEA, UNSPECIFIED TYPE: ICD-10-CM

## 2025-03-28 PROCEDURE — 82103 ALPHA-1-ANTITRYPSIN TOTAL: CPT

## 2025-03-28 PROCEDURE — 36415 COLL VENOUS BLD VENIPUNCTURE: CPT

## 2025-03-30 LAB — A-1-ANTITRYPSIN: 154 MG/DL

## 2025-03-31 ENCOUNTER — OFFICE VISIT (OUTPATIENT)
Dept: PULMONOLOGY | Facility: CLINIC | Age: 75
End: 2025-03-31

## 2025-03-31 VITALS — HEIGHT: 62 IN | RESPIRATION RATE: 20 BRPM | WEIGHT: 185 LBS | BODY MASS INDEX: 34.04 KG/M2 | OXYGEN SATURATION: 95 %

## 2025-03-31 DIAGNOSIS — G47.33 OSA ON CPAP: Primary | ICD-10-CM

## 2025-03-31 PROCEDURE — 99213 OFFICE O/P EST LOW 20 MIN: CPT | Performed by: INTERNAL MEDICINE

## 2025-03-31 RX ORDER — FLUTICASONE FUROATE AND VILANTEROL 100; 25 UG/1; UG/1
1 POWDER RESPIRATORY (INHALATION) DAILY
Qty: 1 EACH | Refills: 5 | Status: SHIPPED | OUTPATIENT
Start: 2025-03-31

## 2025-03-31 NOTE — PROGRESS NOTES
The patient is a 75-year-old female who I know well from prior evaluation comes in now for follow-up.  She has ongoing dyspnea on exertion.  Her pulmonary evaluation has essentially been unremarkable.  She does get occasional wheezing.  She feels like her weight is increased.  She is not a candidate for the newer injectables for weight loss.  Her D-dimer was normal.  Her BNP was normal and her chest x-ray was unrevealing.    Review of Systems:  Vision normal. Ear nose and throat normal. Bowel normal. Bladder function normal. No depression. No thyroid disease. No lymphatic system concerns.  No rash. Muscles and joints unremarkable. No weight loss no weight gain.    Physical Examination:  Vital signs normal. HEENT examination is unremarkable with pupils equal round and reactive to light and accommodation. Neck without adenopathy, thyromegaly, JVD nor bruit. Lungs clear to auscultation and percussion. Cardiac regular rate and rhythm no murmur. Abdomen nontender, without hepatosplenomegaly and no mass appreciable. Extremities and Musculoskeletal without clubbing cyanosis nor edema, and mobility acceptable. Neurologic grossly intact with symmetric tone and strength and reflex.    Assessment and plan:  1.  Dyspnea on exertion-subtle abnormality on the PFT which per my reading looks more like the mild restrictive ventilatory deficit.  The patient does have some wheezing and I will add Breo to follow with gargle rinse and spit.  Patient should plan on seeing me at the 4 to 6-month interval or sooner if needed.  Lose weight.  Avoid alcohol and sedating drug and never drive if sleepy.    2.  Obstructive sleep apnea-excellent download with average daily usage of 7 hours and residual events of 1/h.  Continue current regime.

## 2025-04-01 ENCOUNTER — TELEPHONE (OUTPATIENT)
Dept: PULMONOLOGY | Facility: CLINIC | Age: 75
End: 2025-04-01

## 2025-04-01 RX ORDER — BUDESONIDE AND FORMOTEROL FUMARATE DIHYDRATE 160; 4.5 UG/1; UG/1
2 AEROSOL RESPIRATORY (INHALATION) 2 TIMES DAILY
Qty: 1 EACH | Refills: 5 | Status: SHIPPED | OUTPATIENT
Start: 2025-04-01

## 2025-04-01 NOTE — TELEPHONE ENCOUNTER
Called pharmacy and states Breo is not covered, could not provide covered alternatives, did you want to proceed with PA or try an alternative inhaler?

## 2025-04-01 NOTE — TELEPHONE ENCOUNTER
Current Outpatient Medications   Medication Sig Dispense Refill    fluticasone furoate-vilanterol (BREO ELLIPTA) 100-25 MCG/ACT Inhalation Aerosol Powder, Breath Activated Inhale 1 puff into the lungs daily. Rinse your mouth with water without swallowing after using BREO to help reduce your chance of getting thrush. 1 each 5     Key # [id number]

## 2025-04-01 NOTE — TELEPHONE ENCOUNTER
Symbicort 160 mcg 2 puffs BID with 5 refills per Dr. Bocanegra sent into pharmacy- [facility] in [place], patient notified.

## 2025-04-15 ENCOUNTER — LAB ENCOUNTER (OUTPATIENT)
Dept: LAB | Facility: HOSPITAL | Age: 75
End: 2025-04-15
Attending: INTERNAL MEDICINE
Payer: MEDICARE

## 2025-04-15 DIAGNOSIS — E10.59 TYPE 1 DIABETES MELLITUS WITH VASCULAR DISEASE (HCC): Primary | ICD-10-CM

## 2025-04-15 LAB
ALBUMIN SERPL-MCNC: 3.9 G/DL (ref 3.2–4.8)
ALBUMIN/GLOB SERPL: 1.6 {RATIO} (ref 1–2)
ALP LIVER SERPL-CCNC: 97 U/L (ref 55–142)
ALT SERPL-CCNC: 18 U/L (ref 10–49)
ANION GAP SERPL CALC-SCNC: 6 MMOL/L (ref 0–18)
AST SERPL-CCNC: 19 U/L (ref ?–34)
BILIRUB SERPL-MCNC: 0.6 MG/DL (ref 0.2–1.1)
BUN BLD-MCNC: 35 MG/DL (ref 9–23)
BUN/CREAT SERPL: 28.5 (ref 10–20)
CALCIUM BLD-MCNC: 8.9 MG/DL (ref 8.7–10.4)
CHLORIDE SERPL-SCNC: 101 MMOL/L (ref 98–112)
CO2 SERPL-SCNC: 33 MMOL/L (ref 21–32)
CREAT BLD-MCNC: 1.23 MG/DL (ref 0.55–1.02)
CREAT UR-SCNC: 71.8 MG/DL
EGFRCR SERPLBLD CKD-EPI 2021: 46 ML/MIN/1.73M2 (ref 60–?)
EST. AVERAGE GLUCOSE BLD GHB EST-MCNC: 169 MG/DL (ref 68–126)
FASTING STATUS PATIENT QL REPORTED: YES
GLOBULIN PLAS-MCNC: 2.4 G/DL (ref 2–3.5)
GLUCOSE BLD-MCNC: 152 MG/DL (ref 70–99)
HBA1C MFR BLD: 7.5 % (ref ?–5.7)
MICROALBUMIN UR-MCNC: <0.3 MG/DL
OSMOLALITY SERPL CALC.SUM OF ELEC: 301 MOSM/KG (ref 275–295)
POTASSIUM SERPL-SCNC: 3.6 MMOL/L (ref 3.5–5.1)
PROT SERPL-MCNC: 6.3 G/DL (ref 5.7–8.2)
SODIUM SERPL-SCNC: 140 MMOL/L (ref 136–145)

## 2025-04-15 PROCEDURE — 82570 ASSAY OF URINE CREATININE: CPT

## 2025-04-15 PROCEDURE — 80053 COMPREHEN METABOLIC PANEL: CPT

## 2025-04-15 PROCEDURE — 83036 HEMOGLOBIN GLYCOSYLATED A1C: CPT

## 2025-04-15 PROCEDURE — 36415 COLL VENOUS BLD VENIPUNCTURE: CPT

## 2025-04-15 PROCEDURE — 82043 UR ALBUMIN QUANTITATIVE: CPT

## 2025-04-21 PROBLEM — H40.009 GLAUCOMA SUSPECT: Status: ACTIVE | Noted: 2017-12-22

## 2025-04-21 PROBLEM — E11.3399 MODERATE NONPROLIFERATIVE DIABETIC RETINOPATHY (HCC): Status: ACTIVE | Noted: 2021-01-18

## 2025-04-21 PROBLEM — Z96.1 HISTORY OF INTRAOCULAR LENS IMPLANT: Status: ACTIVE | Noted: 2018-02-12

## 2025-04-21 PROBLEM — E11.3299 MILD NON PROLIFERATIVE DIABETIC RETINOPATHY (HCC): Status: ACTIVE | Noted: 2019-09-12

## 2025-04-21 PROBLEM — H57.10 PAIN IN EYE: Status: ACTIVE | Noted: 2022-11-29

## 2025-04-21 PROBLEM — H26.9 CORTICAL CATARACT: Status: ACTIVE | Noted: 2018-02-10

## 2025-05-05 RX ORDER — FUROSEMIDE 40 MG/1
40 TABLET ORAL DAILY
Qty: 90 TABLET | Refills: 0 | Status: SHIPPED | OUTPATIENT
Start: 2025-05-05

## 2025-06-05 PROBLEM — E11.9 DIABETES MELLITUS, TYPE 2 (HCC): Status: RESOLVED | Noted: 2017-06-16 | Resolved: 2025-06-05

## 2025-06-05 PROBLEM — R29.6 RECURRENT FALLS: Status: ACTIVE | Noted: 2021-10-21

## 2025-06-17 ENCOUNTER — OFFICE VISIT (OUTPATIENT)
Dept: OTOLARYNGOLOGY | Facility: CLINIC | Age: 75
End: 2025-06-17

## 2025-06-17 VITALS — HEIGHT: 62 IN | BODY MASS INDEX: 36.25 KG/M2 | WEIGHT: 197 LBS

## 2025-06-17 DIAGNOSIS — J01.01 ACUTE RECURRENT MAXILLARY SINUSITIS: Primary | ICD-10-CM

## 2025-06-17 DIAGNOSIS — G47.33 OBSTRUCTIVE SLEEP APNEA: ICD-10-CM

## 2025-06-17 DIAGNOSIS — J34.89 INTRANASAL SYNECHIAE: ICD-10-CM

## 2025-06-17 PROCEDURE — 99213 OFFICE O/P EST LOW 20 MIN: CPT | Performed by: SPECIALIST
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
## 2025-06-17 NOTE — PATIENT INSTRUCTIONS
No purulence seen in your nares on either side on the day to your visit.  Continue CPAP when you get your supplies.  Please ask our office to assist if you are unable to get them on your own.  Follow-up in 3 to 4 months time, sooner if problems.

## 2025-07-09 ENCOUNTER — HOSPITAL ENCOUNTER (OUTPATIENT)
Age: 75
Discharge: HOME | End: 2025-07-09
Payer: MEDICARE

## 2025-07-09 ENCOUNTER — APPOINTMENT (OUTPATIENT)
Dept: GENERAL RADIOLOGY | Age: 75
End: 2025-07-09
Attending: NURSE PRACTITIONER
Payer: MEDICARE

## 2025-07-09 VITALS
SYSTOLIC BLOOD PRESSURE: 134 MMHG | TEMPERATURE: 98 F | RESPIRATION RATE: 18 BRPM | DIASTOLIC BLOOD PRESSURE: 46 MMHG | HEART RATE: 69 BPM | OXYGEN SATURATION: 99 %

## 2025-07-09 DIAGNOSIS — S80.02XA CONTUSION OF LEFT KNEE, INITIAL ENCOUNTER: Primary | ICD-10-CM

## 2025-07-09 DIAGNOSIS — S43.402A SPRAIN OF LEFT SHOULDER, UNSPECIFIED SHOULDER SPRAIN TYPE, INITIAL ENCOUNTER: ICD-10-CM

## 2025-07-09 PROCEDURE — 73562 X-RAY EXAM OF KNEE 3: CPT | Performed by: NURSE PRACTITIONER

## 2025-07-09 PROCEDURE — 99213 OFFICE O/P EST LOW 20 MIN: CPT | Performed by: NURSE PRACTITIONER

## 2025-07-09 PROCEDURE — 73030 X-RAY EXAM OF SHOULDER: CPT | Performed by: NURSE PRACTITIONER

## 2025-07-09 NOTE — ED PROVIDER NOTES
Patient Seen in: [facility]    History   CC: knee pain  HPI: Katelynn Cr 75 year old female  who presents c/o left knee and shoulder pain s/p injury yesterday in which pt states she tripped going up a step and fell forward onto her outstretched left arm and front of the knee. Denies hitting her head or loc. Denies dizziness, vision changes, chest pain, difficulty breathing, numbness, tingling, weakness or limitation in range of motion associated.    Past Medical History[1]    Past Surgical History[2]    Family History[3]    Short Social Hx on File[4]    ROS:  Systems reviewed: All pertinent positives noted in HPI. Unless otherwise noted, additional systems reviewed are negative.   Vital signs reviewed.    Positive for stated complaint: KNEE INJURY FROM FALL  Other systems are as noted in HPI.  Constitutional and vital signs reviewed.      All other systems reviewed and negative except as noted above.    PSFH elements reviewed from today and agreed except as otherwise stated in HPI.             Constitutional and vital signs reviewed.        Physical Exam     ED Triage Vitals [07/09/25 1731]   /46   Pulse 69   Resp 18   Temp 97.7 °F (36.5 °C)   Temp src Oral   SpO2 99 %   O2 Device None (Room air)       Current:/46   Pulse 69   Temp 97.7 °F (36.5 °C) (Oral)   Resp 18   SpO2 99%         PE:  General - Appears well, non-toxic and in NAD  Head - Appears symmetrical without deformity/swelling cranium, scalp, or facial bones  Eyes - sclera not injected, no discharge noted, no periorbital edema  Neck - no tenderness upon palpation to posterior c-spine, no obvious sign of trauma/swelling/step-off, full ROM with strong motor strength against resistance  Skin - subtle abrasion noted to left anterior knee without bleeding. Skin otherwise pink warm and dry throughout, mmm, no obvious signs of swelling/trauma/deformity, cap refill <2seconds  Neuro - A&O x4, sensation equal to both medial and  lateral aspects of extremities, steady gait  MSK - makes purposeful movements of all extremities with full ROM noted, foot press/dorsiflexion and straight leg raise, as well as hand grasp strength equal bilat, pedal and radial pulses 2+ bilat.  no midline spinal tenderness or obvious sign of trauma/swelling/deformity, +flexion of the 1st and 5th toes bilat.  + Reproducible tenderness is elicited with palpation to the left shoulder diffuse as well as left anterior knee.  No left hip, ankle, foot, left wrist or hand tenderness to the left upper extremity.  No elbow tenderness to left upper extremity.  Psych - Interactive and appropriate      ED Course   Labs Reviewed - No data to display    MDM     XR KNEE (3 VIEWS), LEFT (CPT=73562)   Final Result   PROCEDURE: XR KNEE (3 VIEWS), LEFT (CPT=73562)      INDICATIONS: KNEE INJURY FROM FALL       TECHNIQUE: 3 views of the left knee.      COMPARISON: There are no comparisons for this exam.      FINDINGS:      Bones: There is no acute fracture. No suspicious osseous lesion.      Joints: No advanced degenerative changes. Possible small suprapatellar    joint effusion      Soft Tissues: There is no focal soft tissue swelling.      =====   CONCLUSION:   1.  Questionable small suprapatellar joint effusion. No acute osseous    abnormality.       Electronically Verified and Signed by Attending Radiologist: Scott Choi MD 7/9/2025 7:59 PM   Workstation: [id number]      XR SHOULDER, COMPLETE (MIN 2 VIEWS), LEFT (CPT=73030)   Final Result   PROCEDURE: XR SHOULDER, COMPLETE (MIN 2 VIEWS), LEFT (CPT=73030)      INDICATION: fall yesterday      PATIENT STATED HISTORY: Left shoulder pain post fall x 1 day ago.      COMPARISON: None         TECHNIQUE: 3 views were obtained.      =====   CONCLUSION:    1.  No acute fracture or subluxation.   2.  Moderate AC joint osteoarthritis.   3.  Mild glenohumeral joint osteoarthritis.   4.  Atherosclerotic vascular calcification.   5.  Clips in  the left axilla.         Electronically Verified and Signed by Attending Radiologist: Carlos Esposito MD 2025 7:21 PM   Workstation: [id number]          DDx: Fracture versus sprain versus contusion    X-ray results as noted above and independently reviewed by this provider.  General sprain/strain/contusion instructions reviewed with patient, rest, ice, elevation, Tylenol or Motrin as needed for discomfort, Ace wrap as provided and indication/precautions and use reviewed, follow-up and strict return/ED precautions reviewed.  Patient is historian and demonstrates understanding of all instruction and agrees with plan of care.      Disposition and Plan     Clinical Impression:  1. Contusion of left knee, initial encounter    2. Sprain of left shoulder, unspecified shoulder sprain type, initial encounter        Disposition:  Discharge    Follow-up:  Orthopedic  Service  [phone] Option 3  Go in 1 week  As needed      Medications Prescribed:  Current Discharge Medication List                   [1]   Past Medical History:   Ankle fracture, lateral malleolus, closed    Asthma (HCC)    Breast cancer (HCC)    Breast cancer of upper-outer quadrant of left female breast (HCC)    Breast cancer, left (HCC)    lumpectomy 2009    Breast cancer, left (HCC)    left breast re-excision, left axillary SLNB    Breast tenderness in female    CAD (coronary artery disease)    Carotid artery disease    LEFT heart cath adn PCI 2010    Cataract    Deep vein thrombosis (HCC)    R arm    Diabetes mellitus (HCC)    Diabetes mellitus, type 2 (HCC)    Encounter for fitting of portacath    venous access; portacath placement, 2009    Encounter for imaging to assess osteopenia    Encounter for monitoring aromatase inhibitor therapy    Essential hypertension    GERD (gastroesophageal reflux disease)    H/O  section    x2    H/O vitrectomy    LEFT vitrectomy    Hip bursitis    left    Hyperlipidemia     Hypertension    Hypothyroidism    Malignant neoplasm of upper-outer quadrant of left breast in female, estrogen receptor positive (HCC)    Neuropathy    B/L feet    Osteoarthritis    B/L shoulders    Osteopenia of lumbar spine    Personal history of antineoplastic chemotherapy    Preglaucoma, unspecified, unspecified eye    Psoriasis    Sleep apnea    Squamous cell cancer of skin of left cheek    Thrombus    SVC thrombus; Thrombolysis 2009    Trigger finger of both hands    Type 1 diabetes mellitus (HCC)    Type 1 diabetes mellitus with hyperglycemia (HCC)    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 Ellis    Cataract extraction w/  intraocular lens implant Right 2019    Dr. Ellis    Chemotherapy      Cholecystectomy      Lumpectomy left Left 2009    Needle biopsy right      Needle core biopsy, breast (dmg) Right 10/07/2009    RIGHT breast nodules; mammatome core needle biopsies;     Other surgical history  2024    Endoscopic right maxillary antrostomy with tissue removal, as well as right anterior ethmoidectomy, and placement of Propel implant.    Radiation left     [3]   Family History  Problem Relation Age of Onset    Breast Cancer Self 60    Heart Disease Mother         CAD - Premature    Heart Disease Father         (cause of death)    Ovarian Cancer Neg     Prostate Cancer Neg     Pancreatic Cancer Neg    [4]   Social History  Socioeconomic History    Marital status:    Tobacco Use    Smoking status: Never    Smokeless tobacco: Never   Vaping Use    Vaping status: Never Used   Substance and Sexual Activity    Alcohol use: Yes     Alcohol/week: 1.0 - 2.0 standard drink of alcohol     Types: 1 - 2 Glasses of wine per week      Comment: weekly    Drug use: No   Other Topics Concern    Caffeine Concern Yes     Comment: 4 cups coffee daily

## 2025-07-09 NOTE — ED INITIAL ASSESSMENT (HPI)
Pt with L knee pain after a trip and fall that occurred yesterday. Pt denied pain at rest. Pt denied hitting head.

## 2025-07-10 NOTE — DISCHARGE INSTRUCTIONS
Rest from exacerbating activities for the next week. Apply ice/cold compress for 20min at a time 4-6x daily for the next 2-3 days. Keep the left knee and shoulder elevated when resting as much as possible. You may take Motrin every 6 hours as needed for pain. Take this with food. If additional pain control is needed, you may also take Tylenol every 6 hours. Follow up with your primary provider or the orthopedic specialist provided in your discharge instructions in the next 2-3 days. Seek additional care in the ER for new or worsening symptoms, fever or increasing pain.

## 2025-07-14 ENCOUNTER — LAB ENCOUNTER (OUTPATIENT)
Dept: LAB | Facility: HOSPITAL | Age: 75
End: 2025-07-14
Attending: INTERNAL MEDICINE
Payer: MEDICARE

## 2025-07-14 DIAGNOSIS — E11.9 DIABETES (HCC): Primary | ICD-10-CM

## 2025-07-14 DIAGNOSIS — E10.59 TYPE 1 DIABETES MELLITUS WITH VASCULAR DISEASE (HCC): ICD-10-CM

## 2025-07-14 PROBLEM — M23.92 INTERNAL DERANGEMENT OF LEFT KNEE: Status: ACTIVE | Noted: 2025-07-14

## 2025-07-14 PROBLEM — S80.02XA CONTUSION OF LEFT KNEE: Status: ACTIVE | Noted: 2025-07-14

## 2025-07-14 LAB
ALBUMIN SERPL-MCNC: 4.1 G/DL (ref 3.2–4.8)
ALBUMIN/GLOB SERPL: 1.7 {RATIO} (ref 1–2)
ALP LIVER SERPL-CCNC: 135 U/L (ref 55–142)
ALT SERPL-CCNC: 25 U/L (ref 10–49)
ANION GAP SERPL CALC-SCNC: 9 MMOL/L (ref 0–18)
AST SERPL-CCNC: 20 U/L (ref ?–34)
BASOPHILS # BLD AUTO: 0.08 X10(3) UL (ref 0–0.2)
BASOPHILS NFR BLD AUTO: 0.9 %
BILIRUB SERPL-MCNC: 0.6 MG/DL (ref 0.2–1.1)
BUN BLD-MCNC: 31 MG/DL (ref 9–23)
BUN/CREAT SERPL: 24.2 (ref 10–20)
CALCIUM BLD-MCNC: 8.9 MG/DL (ref 8.7–10.4)
CHLORIDE SERPL-SCNC: 101 MMOL/L (ref 98–112)
CO2 SERPL-SCNC: 30 MMOL/L (ref 21–32)
CREAT BLD-MCNC: 1.28 MG/DL (ref 0.55–1.02)
CREAT UR-SCNC: 44.2 MG/DL
DEPRECATED RDW RBC AUTO: 46 FL (ref 35.1–46.3)
EGFRCR SERPLBLD CKD-EPI 2021: 43 ML/MIN/1.73M2 (ref 60–?)
EOSINOPHIL # BLD AUTO: 0.74 X10(3) UL (ref 0–0.7)
EOSINOPHIL NFR BLD AUTO: 8.7 %
ERYTHROCYTE [DISTWIDTH] IN BLOOD BY AUTOMATED COUNT: 13.6 % (ref 11–15)
EST. AVERAGE GLUCOSE BLD GHB EST-MCNC: 166 MG/DL (ref 68–126)
FASTING STATUS PATIENT QL REPORTED: YES
GLOBULIN PLAS-MCNC: 2.4 G/DL (ref 2–3.5)
GLUCOSE BLD-MCNC: 112 MG/DL (ref 70–99)
HBA1C MFR BLD: 7.4 % (ref ?–5.7)
HCT VFR BLD AUTO: 38.2 % (ref 35–48)
HGB BLD-MCNC: 12.2 G/DL (ref 12–16)
IMM GRANULOCYTES # BLD AUTO: 0.02 X10(3) UL (ref 0–1)
IMM GRANULOCYTES NFR BLD: 0.2 %
LYMPHOCYTES # BLD AUTO: 1.28 X10(3) UL (ref 1–4)
LYMPHOCYTES NFR BLD AUTO: 15.1 %
MCH RBC QN AUTO: 29.1 PG (ref 26–34)
MCHC RBC AUTO-ENTMCNC: 31.9 G/DL (ref 31–37)
MCV RBC AUTO: 91.2 FL (ref 80–100)
MONOCYTES # BLD AUTO: 0.76 X10(3) UL (ref 0.1–1)
MONOCYTES NFR BLD AUTO: 9 %
NEUTROPHILS # BLD AUTO: 5.6 X10 (3) UL (ref 1.5–7.7)
NEUTROPHILS # BLD AUTO: 5.6 X10(3) UL (ref 1.5–7.7)
NEUTROPHILS NFR BLD AUTO: 66.1 %
OSMOLALITY SERPL CALC.SUM OF ELEC: 297 MOSM/KG (ref 275–295)
PLATELET # BLD AUTO: 260 10(3)UL (ref 150–450)
POTASSIUM SERPL-SCNC: 3.7 MMOL/L (ref 3.5–5.1)
PROT SERPL-MCNC: 6.5 G/DL (ref 5.7–8.2)
PROT UR-MCNC: <6 MG/DL (ref ?–14)
RBC # BLD AUTO: 4.19 X10(6)UL (ref 3.8–5.3)
SODIUM SERPL-SCNC: 140 MMOL/L (ref 136–145)
WBC # BLD AUTO: 8.5 X10(3) UL (ref 4–11)

## 2025-07-14 PROCEDURE — 84156 ASSAY OF PROTEIN URINE: CPT

## 2025-07-14 PROCEDURE — 82570 ASSAY OF URINE CREATININE: CPT

## 2025-07-14 PROCEDURE — 80053 COMPREHEN METABOLIC PANEL: CPT

## 2025-07-14 PROCEDURE — 36415 COLL VENOUS BLD VENIPUNCTURE: CPT

## 2025-07-14 PROCEDURE — 83516 IMMUNOASSAY NONANTIBODY: CPT

## 2025-07-14 PROCEDURE — 83036 HEMOGLOBIN GLYCOSYLATED A1C: CPT

## 2025-07-14 PROCEDURE — 85025 COMPLETE CBC W/AUTO DIFF WBC: CPT

## 2025-07-15 LAB — M2 MITOCHONDRIAL AB: 25.4 UNITS

## 2025-08-04 RX ORDER — FUROSEMIDE 40 MG/1
40 TABLET ORAL DAILY
Qty: 90 TABLET | Refills: 3 | Status: SHIPPED | OUTPATIENT
Start: 2025-08-04

## 2025-08-21 ENCOUNTER — OFFICE VISIT (OUTPATIENT)
Dept: INTERNAL MEDICINE CLINIC | Facility: CLINIC | Age: 75
End: 2025-08-21

## 2025-08-21 VITALS
WEIGHT: 197 LBS | SYSTOLIC BLOOD PRESSURE: 140 MMHG | OXYGEN SATURATION: 95 % | HEART RATE: 69 BPM | HEIGHT: 62 IN | DIASTOLIC BLOOD PRESSURE: 60 MMHG | BODY MASS INDEX: 36.25 KG/M2

## 2025-08-21 DIAGNOSIS — Z85.3 HX OF BREAST CANCER: ICD-10-CM

## 2025-08-21 DIAGNOSIS — E10.9 TYPE 1 DIABETES MELLITUS WITHOUT COMPLICATION (HCC): ICD-10-CM

## 2025-08-21 DIAGNOSIS — Z00.00 MEDICARE ANNUAL WELLNESS VISIT, SUBSEQUENT: Primary | ICD-10-CM

## 2025-08-21 DIAGNOSIS — G47.33 OSA (OBSTRUCTIVE SLEEP APNEA): ICD-10-CM

## 2025-08-21 DIAGNOSIS — E78.2 MIXED HYPERLIPIDEMIA: ICD-10-CM

## 2025-08-21 PROCEDURE — G0439 PPPS, SUBSEQ VISIT: HCPCS | Performed by: INTERNAL MEDICINE

## 2025-08-21 RX ORDER — AZITHROMYCIN 250 MG/1
TABLET, FILM COATED ORAL
Qty: 6 TABLET | Refills: 0 | Status: SHIPPED | OUTPATIENT
Start: 2025-08-21 | End: 2025-08-26

## 2025-08-25 PROBLEM — M17.12 PRIMARY OSTEOARTHRITIS OF LEFT KNEE: Status: ACTIVE | Noted: 2025-08-25

## (undated) DEVICE — NEEDLE SPNL 25GA L3.5IN BLU HUB QNCKE BVL

## (undated) DEVICE — ENCORE® LATEX MICRO SIZE 6, STERILE LATEX POWDER-FREE SURGICAL GLOVE: Brand: ENCORE

## (undated) DEVICE — NASAL ACCESSORY: Brand: MEDLINE INDUSTRIES, INC.

## (undated) DEVICE — SOLUTION IRRIG 1000ML 0.9% NACL USP BTL

## (undated) DEVICE — PACKING 470404 MEROCEL 2000 10PK 8CM: Brand: MEROCEL®

## (undated) DEVICE — SUCTION CANISTER, 3000CC,SAFELINER: Brand: DEROYAL

## (undated) DEVICE — 3 ML SYRINGE LUER-LOCK TIP: Brand: MONOJECT

## (undated) DEVICE — DEPRESSOR TNG L6IN WOOD

## (undated) DEVICE — STANDARD HYPODERMIC NEEDLE,POLYPROPYLENE HUB: Brand: MONOJECT

## (undated) DEVICE — HEAD & NECK: Brand: MEDLINE INDUSTRIES, INC.

## (undated) DEVICE — PAD,EYE,LARGE,2 1/8"X2 5/8",STERILE,LF: Brand: MEDLINE

## (undated) DEVICE — SKIN PREP TRAY 4 COMPARTM TRAY: Brand: MEDLINE INDUSTRIES, INC.

## (undated) NOTE — MR AVS SNAPSHOT
[address] at [address] [facility] [facility] [id number] [phone]               Thank you for choosing us for your health care visit with Isis Carias MD.  We are glad to serve you and happy to provide you with [facility] Calcipotriene 0.005 % Oint           Fluocinonide 0.05 % Oint   Commonly known as:  LIDEX           Levothyroxine Sodium 137 MCG Tabs   Take 137 mcg by mouth before breakfast.   Commonly known as:  SYNTHROID, LEVOTHROID           [facility] Facility, call Central Scheduling at [phone], Monday through Friday between 7:30am to 6pm and on Saturday between 8am and 1pm.  Evening and weekend appointments for your exam are available.     Edward Eduardo ([address] [place] [address])  060 return for a follow-up Blood Pressure Check in 1 year.      Lifestyle Modification Recommendations:    Modification Recommendation   Weight Reduction Maintain normal body weight (body mass index 18.5-24.9 kg/m2)   DASH eating plan Adopt a diet rich in fruit 2 ½ hours per week – spread out over time Use a muna to keep you motivated   Don’t forget strength training with weights and resistance Set goals and track your progress   You don’t need to join a gym. Home exercises work great.  Put more priority on exe

## (undated) NOTE — MR AVS SNAPSHOT
[address] at [address] [id number] [phone]               Thank you for choosing us for your health care visit with Roberth Cano MD.  We are glad to serve you and happy to provide you with back pain. You may also have urgent or frequent urination. · Pyelonephritis: This is a kidney infection. If not treated, it can be serious and damage your kidneys. In severe cases, you may be hospitalized. You may have a fever and lower back pain.   [facility] to make sure the infection has cleared. If necessary, additional treatment may be started. Date Last Reviewed: 9/8/2014  © 7504-0962 [facility] [place], [address] [place] [address]. All rights reserved.  This information is not intende Pain (pain with urination). Commonly known as:  PYRIDIUM           simvastatin 40 MG Tabs   Commonly known as:  ZOCOR           ZETIA 10 MG Tabs   Generic drug:  ezetimibe   Take 10 mg by mouth nightly.                 Where to Get Your Medications      T

## (undated) NOTE — MR AVS SNAPSHOT
[place]  [address]  [phone]               Thank you for choosing us for your health care visit with [facility], [facility].   We are glad to serve you and happy to provide you with this summary of Take 1 tablet (100 mg total) by mouth 3 (three) times daily as needed for Pain (pain with urination).    Commonly known as:  PYRIDIUM           simvastatin 40 MG Tabs   Commonly known as:  ZOCOR           ZETIA 10 MG Tabs   Generic drug:  ezetimibe   Take 1

## (undated) NOTE — LETTER
06/13/18        [address] [facility]      Dear Mike Doherty records indicate that you have outstanding lab work and or testing that was ordered for you and has not yet been completed:          Comp Metabolic Panel